# Patient Record
Sex: FEMALE | Race: WHITE | Employment: UNEMPLOYED | URBAN - METROPOLITAN AREA
[De-identification: names, ages, dates, MRNs, and addresses within clinical notes are randomized per-mention and may not be internally consistent; named-entity substitution may affect disease eponyms.]

---

## 2022-04-13 ENCOUNTER — HOSPITAL ENCOUNTER (OUTPATIENT)
Age: 57
Setting detail: SPECIMEN
Discharge: HOME | End: 2022-04-13

## 2022-04-13 ENCOUNTER — OFFICE VISIT (OUTPATIENT)
Dept: FAMILY MEDICINE CLINIC | Age: 57
End: 2022-04-13
Payer: COMMERCIAL

## 2022-04-13 VITALS
DIASTOLIC BLOOD PRESSURE: 66 MMHG | SYSTOLIC BLOOD PRESSURE: 113 MMHG | HEIGHT: 63 IN | WEIGHT: 174.4 LBS | HEART RATE: 72 BPM | BODY MASS INDEX: 30.9 KG/M2

## 2022-04-13 DIAGNOSIS — K59.00 CONSTIPATION, UNSPECIFIED CONSTIPATION TYPE: ICD-10-CM

## 2022-04-13 DIAGNOSIS — Z12.31 ENCOUNTER FOR SCREENING MAMMOGRAM FOR BREAST CANCER: ICD-10-CM

## 2022-04-13 DIAGNOSIS — Z13.220 SCREENING FOR HYPERLIPIDEMIA: ICD-10-CM

## 2022-04-13 DIAGNOSIS — Z00.00 HEALTH CARE MAINTENANCE: ICD-10-CM

## 2022-04-13 DIAGNOSIS — M54.42 CHRONIC BILATERAL LOW BACK PAIN WITH LEFT-SIDED SCIATICA: Primary | ICD-10-CM

## 2022-04-13 DIAGNOSIS — K21.9 GASTROESOPHAGEAL REFLUX DISEASE WITHOUT ESOPHAGITIS: ICD-10-CM

## 2022-04-13 DIAGNOSIS — Z12.11 SCREEN FOR COLON CANCER: ICD-10-CM

## 2022-04-13 DIAGNOSIS — G89.29 CHRONIC BILATERAL LOW BACK PAIN WITH LEFT-SIDED SCIATICA: Primary | ICD-10-CM

## 2022-04-13 LAB
ABSOLUTE EOS #: 0.1 K/UL (ref 0–0.44)
ABSOLUTE IMMATURE GRANULOCYTE: 0.03 K/UL (ref 0–0.3)
ABSOLUTE LYMPH #: 1.77 K/UL (ref 1.1–3.7)
ABSOLUTE MONO #: 0.6 K/UL (ref 0.1–1.2)
ALBUMIN SERPL-MCNC: 4.4 G/DL (ref 3.5–5.2)
ALBUMIN/GLOBULIN RATIO: 1.6 (ref 1–2.5)
ALP BLD-CCNC: 107 U/L (ref 35–104)
ALT SERPL-CCNC: 21 U/L (ref 5–33)
ANION GAP SERPL CALCULATED.3IONS-SCNC: 14 MMOL/L (ref 9–17)
AST SERPL-CCNC: 23 U/L
BASOPHILS # BLD: 0 % (ref 0–2)
BASOPHILS ABSOLUTE: 0.03 K/UL (ref 0–0.2)
BILIRUB SERPL-MCNC: 0.31 MG/DL (ref 0.3–1.2)
BUN BLDV-MCNC: 12 MG/DL (ref 6–20)
CALCIUM SERPL-MCNC: 9.7 MG/DL (ref 8.6–10.4)
CHLORIDE BLD-SCNC: 107 MMOL/L (ref 98–107)
CHOLESTEROL/HDL RATIO: 3.4
CHOLESTEROL: 211 MG/DL
CO2: 26 MMOL/L (ref 20–31)
CREAT SERPL-MCNC: 0.73 MG/DL (ref 0.5–0.9)
EOSINOPHILS RELATIVE PERCENT: 1 % (ref 1–4)
ESTIMATED AVERAGE GLUCOSE: 108 MG/DL
GFR AFRICAN AMERICAN: >60 ML/MIN
GFR NON-AFRICAN AMERICAN: >60 ML/MIN
GFR SERPL CREATININE-BSD FRML MDRD: ABNORMAL ML/MIN/{1.73_M2}
GLUCOSE BLD-MCNC: 90 MG/DL (ref 70–99)
HBA1C MFR BLD: 5.4 % (ref 4–6)
HCT VFR BLD CALC: 44.2 % (ref 36.3–47.1)
HDLC SERPL-MCNC: 62 MG/DL
HEMOGLOBIN: 14.5 G/DL (ref 11.9–15.1)
HEPATITIS C ANTIBODY: NONREACTIVE
HIV AG/AB: NONREACTIVE
IMMATURE GRANULOCYTES: 0 %
LDL CHOLESTEROL: 118 MG/DL (ref 0–130)
LYMPHOCYTES # BLD: 25 % (ref 24–43)
MCH RBC QN AUTO: 29.9 PG (ref 25.2–33.5)
MCHC RBC AUTO-ENTMCNC: 32.8 G/DL (ref 28.4–34.8)
MCV RBC AUTO: 91.1 FL (ref 82.6–102.9)
MONOCYTES # BLD: 9 % (ref 3–12)
NRBC AUTOMATED: 0 PER 100 WBC
PDW BLD-RTO: 13.2 % (ref 11.8–14.4)
PLATELET # BLD: 238 K/UL (ref 138–453)
PMV BLD AUTO: 9.5 FL (ref 8.1–13.5)
POTASSIUM SERPL-SCNC: 4.7 MMOL/L (ref 3.7–5.3)
RBC # BLD: 4.85 M/UL (ref 3.95–5.11)
SEG NEUTROPHILS: 65 % (ref 36–65)
SEGMENTED NEUTROPHILS ABSOLUTE COUNT: 4.51 K/UL (ref 1.5–8.1)
SODIUM BLD-SCNC: 147 MMOL/L (ref 135–144)
TOTAL PROTEIN: 7.2 G/DL (ref 6.4–8.3)
TRIGL SERPL-MCNC: 153 MG/DL
TSH SERPL DL<=0.05 MIU/L-ACNC: 1.68 UIU/ML (ref 0.3–5)
WBC # BLD: 7 K/UL (ref 3.5–11.3)

## 2022-04-13 PROCEDURE — 99211 OFF/OP EST MAY X REQ PHY/QHP: CPT | Performed by: FAMILY MEDICINE

## 2022-04-13 PROCEDURE — 99203 OFFICE O/P NEW LOW 30 MIN: CPT | Performed by: STUDENT IN AN ORGANIZED HEALTH CARE EDUCATION/TRAINING PROGRAM

## 2022-04-13 RX ORDER — OMEPRAZOLE 10 MG/1
10 CAPSULE, DELAYED RELEASE ORAL DAILY
COMMUNITY
End: 2022-04-13 | Stop reason: SDUPTHER

## 2022-04-13 RX ORDER — OMEPRAZOLE 10 MG/1
10 CAPSULE, DELAYED RELEASE ORAL DAILY
Qty: 30 CAPSULE | Refills: 0 | Status: SHIPPED | OUTPATIENT
Start: 2022-04-13 | End: 2022-04-27 | Stop reason: DRUGHIGH

## 2022-04-13 RX ORDER — CYCLOBENZAPRINE HCL 5 MG
5 TABLET ORAL NIGHTLY PRN
Qty: 28 TABLET | Refills: 0 | Status: SHIPPED | OUTPATIENT
Start: 2022-04-13 | End: 2022-07-06 | Stop reason: SDUPTHER

## 2022-04-13 ASSESSMENT — PATIENT HEALTH QUESTIONNAIRE - PHQ9
9. THOUGHTS THAT YOU WOULD BE BETTER OFF DEAD, OR OF HURTING YOURSELF: 0
2. FEELING DOWN, DEPRESSED OR HOPELESS: 1
SUM OF ALL RESPONSES TO PHQ9 QUESTIONS 1 & 2: 2
10. IF YOU CHECKED OFF ANY PROBLEMS, HOW DIFFICULT HAVE THESE PROBLEMS MADE IT FOR YOU TO DO YOUR WORK, TAKE CARE OF THINGS AT HOME, OR GET ALONG WITH OTHER PEOPLE: 0
5. POOR APPETITE OR OVEREATING: 3
4. FEELING TIRED OR HAVING LITTLE ENERGY: 1
7. TROUBLE CONCENTRATING ON THINGS, SUCH AS READING THE NEWSPAPER OR WATCHING TELEVISION: 0
SUM OF ALL RESPONSES TO PHQ QUESTIONS 1-9: 11
3. TROUBLE FALLING OR STAYING ASLEEP: 3
8. MOVING OR SPEAKING SO SLOWLY THAT OTHER PEOPLE COULD HAVE NOTICED. OR THE OPPOSITE, BEING SO FIGETY OR RESTLESS THAT YOU HAVE BEEN MOVING AROUND A LOT MORE THAN USUAL: 1
SUM OF ALL RESPONSES TO PHQ QUESTIONS 1-9: 11
1. LITTLE INTEREST OR PLEASURE IN DOING THINGS: 1
SUM OF ALL RESPONSES TO PHQ QUESTIONS 1-9: 11
6. FEELING BAD ABOUT YOURSELF - OR THAT YOU ARE A FAILURE OR HAVE LET YOURSELF OR YOUR FAMILY DOWN: 1
SUM OF ALL RESPONSES TO PHQ QUESTIONS 1-9: 11

## 2022-04-13 ASSESSMENT — ENCOUNTER SYMPTOMS
VOMITING: 0
NAUSEA: 0
SHORTNESS OF BREATH: 0
COLOR CHANGE: 0
CONSTIPATION: 1
WHEEZING: 0
COUGH: 0
ABDOMINAL PAIN: 0
SINUS PAIN: 0
BLOOD IN STOOL: 0
BACK PAIN: 1
SINUS PRESSURE: 0
DIARRHEA: 0

## 2022-04-13 NOTE — PROGRESS NOTES
Visit Information    Have you changed or started any medications since your last visit including any over-the-counter medicines, vitamins, or herbal medicines? no   Have you stopped taking any of your medications? Is so, why? -  no  Are you having any side effects from any of your medications? - no    Have you seen any other physician or provider since your last visit?  no   Have you had any other diagnostic tests since your last visit?  no   Have you been seen in the emergency room and/or had an admission in a hospital since we last saw you?  no   Have you had your routine dental cleaning in the past 6 months?  no     Do you have an active MyChart account? If no, what is the barrier?   No: inactive    Patient Care Team:  Arturo Brandt MD as PCP - General (Emergency Medicine)    Medical History Review  Past Medical, Family, and Social History reviewed and does not contribute to the patient presenting condition    Health Maintenance   Topic Date Due    Hepatitis C screen  Never done    COVID-19 Vaccine (1) Never done    Depression Screen  Never done    HIV screen  Never done    DTaP/Tdap/Td vaccine (1 - Tdap) Never done    Cervical cancer screen  Never done    Lipid screen  Never done    Colorectal Cancer Screen  Never done    Breast cancer screen  Never done    Shingles Vaccine (1 of 2) Never done    Flu vaccine (Season Ended) 09/01/2022    Hepatitis A vaccine  Aged Out    Hepatitis B vaccine  Aged Out    Hib vaccine  Aged Out    Meningococcal (ACWY) vaccine  Aged Out    Pneumococcal 0-64 years Vaccine  Aged Out

## 2022-04-13 NOTE — PROGRESS NOTES
Subjective:    Bola Ann is a 64 y.o. female with  has no past medical history on file. Presented to the office today for:  Chief Complaint   Patient presents with    Back Pain     disability for back pain, for about a year     Leg Pain     left leg pain, almost not able to walk or leg gives out, would like to discuss walker pt has done PT and stationary bicycle     Health Maintenance     need to discuss colonscopy, lives on laxatives to use restroom, due for mammogram, had full hysterectomy with cervix removed, declined vaccines        HPI  This is a 78-year-old female with a surgical history of hysterectomy with bilateral salpingo-oophorectomy unknown if the cervix is removed, 3 bladder surgeries including sling procedure, sleeve gastrectomy and chronic back pain came in today to establish care. Cc: Back pain   Onset: 8 years ago   Mechanism: Unknown   Characteristic: Sharp   Duration: Intermittent   Worsening/Improving: Worsening   Aggravating factors: Physical exertion and bending forward   Relieving factors: Muscle relaxant   Associated symptoms: None   Radiation: Recently started radiating towards left side   Intervention tried: Muscle relaxant   Direct trauma/fall/injury: None   Weakness/numbess/tingling: None   Night pain: Wakes her up but usually whenever she is sleeping in awkward position   Diabetes/Thyroid disease: Ordered                      Review of Systems   Constitutional: Negative for chills and fever. HENT: Negative for congestion, sinus pressure and sinus pain. Respiratory: Negative for cough, shortness of breath and wheezing. Cardiovascular: Negative for chest pain, palpitations and leg swelling. Gastrointestinal: Positive for constipation. Negative for abdominal pain, blood in stool, diarrhea, nausea and vomiting. Endocrine: Positive for cold intolerance. Genitourinary: Negative for difficulty urinating, flank pain and hematuria.    Musculoskeletal: Positive for back pain. Negative for arthralgias and myalgias. Skin: Negative for color change and wound. Neurological: Negative for dizziness, light-headedness and headaches. Psychiatric/Behavioral: Negative for agitation and confusion. ROS negative except what mentioned in HPI. The patient has a   Family History   Problem Relation Age of Onset    Asthma Mother     Other Mother         COPD    Cancer Father         Lung    Substance Abuse Sister         Clean for a year now   Malena Her Obesity Sister     No Known Problems Brother     No Known Problems Brother        Objective:    /66 (Site: Left Upper Arm, Position: Sitting, Cuff Size: Medium Adult) Comment: machine  Pulse 72   Ht 5' 3\" (1.6 m)   Wt 174 lb 6.4 oz (79.1 kg)   BMI 30.89 kg/m²    BP Readings from Last 3 Encounters:   04/13/22 113/66       Physical Exam  Vitals and nursing note reviewed. Constitutional:       Appearance: Normal appearance. HENT:      Head: Normocephalic and atraumatic. Mouth/Throat:      Mouth: Mucous membranes are moist.   Eyes:      Conjunctiva/sclera: Conjunctivae normal.   Cardiovascular:      Rate and Rhythm: Normal rate and regular rhythm. Pulmonary:      Effort: Pulmonary effort is normal.      Breath sounds: Normal breath sounds. Abdominal:      General: Bowel sounds are normal. There is no distension. Palpations: Abdomen is soft. There is no mass. Tenderness: There is no abdominal tenderness. There is no guarding. Musculoskeletal:        Back:       Right lower leg: No edema. Left lower leg: No edema. Neurological:      General: No focal deficit present. Mental Status: She is alert and oriented to person, place, and time.    Psychiatric:         Mood and Affect: Mood normal.         Behavior: Behavior normal.         No results found for: WBC, HGB, HCT, PLT, CHOL, TRIG, HDL, LDLDIRECT, ALT, AST, NA, K, CL, CREATININE, BUN, CO2, TSH, PSA, INR, GLUF, LABA1C, LABMICR  No results found for: CALCIUM, PHOS  No results found for: LDLCALC, LDLCHOLESTEROL, LDLDIRECT    Examination including this and mentioned above in HPI. Assessment and Plan:    1. Chronic bilateral low back pain with left-sided sciatica  -Most likely spinal stenosis versus osteoarthritis, will get an x-ray, give patient some muscle relaxant, will refer to neurosurgery/orthopedic based on the imaging finding. Patient at this point does not want to try physical therapy as previously it aggravated the symptoms. - XR LUMBAR SPINE (2-3 VIEWS); Future  - Handicap Placard MISC; by Does not apply route Will be valid for 5 year  Dispense: 1 each; Refill: 0  -Flexeril 5 mg at night. 2. Constipation, unspecified constipation type  - TSH With Reflex Ft4; Future    3. Health care maintenance  - Hepatitis C Antibody; Future  - HIV Screen; Future  - CBC with Auto Differential; Future  - Comprehensive Metabolic Panel; Future  - Hemoglobin A1C; Future  - [facility] Digital Screen Bilateral [[id number]]; Future  - Fecal DNA Colorectal cancer screening (Cologuard)  - Lipid Panel; Future  - [facility] Digital Screen Bilateral [id number]; Future    4. Gastroesophageal reflux disease without esophagitis  - omeprazole (PRILOSEC) 10 MG delayed release capsule; Take 1 capsule by mouth daily  Dispense: 30 capsule;  Refill: 0          Requested Prescriptions     Signed Prescriptions Disp Refills    Handicap Placard MISC 1 each 0     Sig: by Does not apply route Will be valid for 5 year    omeprazole (PRILOSEC) 10 MG delayed release capsule 30 capsule 0     Sig: Take 1 capsule by mouth daily    cyclobenzaprine (FLEXERIL) 5 MG tablet 28 tablet 0     Sig: Take 1 tablet by mouth nightly as needed for Muscle spasms       Medications Discontinued During This Encounter   Medication Reason    omeprazole (PRILOSEC) 10 MG delayed release capsule REORDER       Nena received counseling on the following healthy behaviors: nutrition, exercise and medication adherence      Patient was counseled about importance of taking medication which were prescribed today and also which he is already using during today conversation. Discussed use,benefit, and side effects of prescribed medications. Barriers to medication compliance addressed. Patient was also counseled that lifestyle changes including diet, smoking and use of less alcohol will benefit their health in long term. All the question asked by the patient were answered in non-medical terms and to be best of writer knowledge. Patient understands and agree to the plan. Teach back method was used while having the conversation. All patient questions answered. Pt voiced understanding. Return in about 2 weeks (around 4/27/2022). Disclaimer: Some oral of this note was transcribed using voice-recognition software. This may cause typographical errors occasionally, please review it with the context of the note. Although all effort is made to fix these errors, please do not hesitate to contact our office if there Moon Crum concern with the understanding of this note.

## 2022-04-14 ENCOUNTER — HOSPITAL ENCOUNTER (OUTPATIENT)
Dept: GENERAL RADIOLOGY | Age: 57
Discharge: HOME | End: 2022-04-16
Payer: COMMERCIAL

## 2022-04-14 ENCOUNTER — HOSPITAL ENCOUNTER (OUTPATIENT)
Age: 57
Discharge: HOME | End: 2022-04-16
Payer: COMMERCIAL

## 2022-04-14 DIAGNOSIS — G89.29 CHRONIC BILATERAL LOW BACK PAIN WITH LEFT-SIDED SCIATICA: ICD-10-CM

## 2022-04-14 DIAGNOSIS — M54.42 CHRONIC BILATERAL LOW BACK PAIN WITH LEFT-SIDED SCIATICA: ICD-10-CM

## 2022-04-14 PROCEDURE — 72100 X-RAY EXAM L-S SPINE 2/3 VWS: CPT

## 2022-04-27 ENCOUNTER — OFFICE VISIT (OUTPATIENT)
Dept: FAMILY MEDICINE CLINIC | Age: 57
End: 2022-04-27
Payer: COMMERCIAL

## 2022-04-27 VITALS — WEIGHT: 177.6 LBS | BODY MASS INDEX: 31.47 KG/M2 | TEMPERATURE: 97 F | HEIGHT: 63 IN

## 2022-04-27 DIAGNOSIS — G89.29 CHRONIC BILATERAL LOW BACK PAIN WITH BILATERAL SCIATICA: ICD-10-CM

## 2022-04-27 DIAGNOSIS — R20.0 NUMBNESS AND TINGLING IN BOTH HANDS: Primary | ICD-10-CM

## 2022-04-27 DIAGNOSIS — M54.42 CHRONIC BILATERAL LOW BACK PAIN WITH BILATERAL SCIATICA: ICD-10-CM

## 2022-04-27 DIAGNOSIS — M54.41 CHRONIC BILATERAL LOW BACK PAIN WITH BILATERAL SCIATICA: ICD-10-CM

## 2022-04-27 DIAGNOSIS — R20.2 NUMBNESS AND TINGLING IN BOTH HANDS: Primary | ICD-10-CM

## 2022-04-27 DIAGNOSIS — K21.9 GASTROESOPHAGEAL REFLUX DISEASE WITHOUT ESOPHAGITIS: ICD-10-CM

## 2022-04-27 PROCEDURE — 99213 OFFICE O/P EST LOW 20 MIN: CPT | Performed by: STUDENT IN AN ORGANIZED HEALTH CARE EDUCATION/TRAINING PROGRAM

## 2022-04-27 RX ORDER — OMEPRAZOLE 20 MG/1
20 TABLET, DELAYED RELEASE ORAL DAILY
Qty: 30 TABLET | Refills: 3 | Status: SHIPPED | OUTPATIENT
Start: 2022-04-27 | End: 2022-08-24

## 2022-04-27 RX ORDER — GABAPENTIN 100 MG/1
100 CAPSULE ORAL 2 TIMES DAILY
Qty: 60 CAPSULE | Refills: 1 | Status: SHIPPED | OUTPATIENT
Start: 2022-04-27 | End: 2022-06-29

## 2022-04-27 SDOH — ECONOMIC STABILITY: FOOD INSECURITY: WITHIN THE PAST 12 MONTHS, YOU WORRIED THAT YOUR FOOD WOULD RUN OUT BEFORE YOU GOT MONEY TO BUY MORE.: NEVER TRUE

## 2022-04-27 SDOH — ECONOMIC STABILITY: FOOD INSECURITY: WITHIN THE PAST 12 MONTHS, THE FOOD YOU BOUGHT JUST DIDN'T LAST AND YOU DIDN'T HAVE MONEY TO GET MORE.: NEVER TRUE

## 2022-04-27 ASSESSMENT — ENCOUNTER SYMPTOMS
NAUSEA: 0
BACK PAIN: 1
DIARRHEA: 0
CONSTIPATION: 0
WHEEZING: 0
COLOR CHANGE: 0
SHORTNESS OF BREATH: 0
VOMITING: 0
SINUS PRESSURE: 0
COUGH: 0
BLOOD IN STOOL: 0
ABDOMINAL PAIN: 0
SINUS PAIN: 0

## 2022-04-27 ASSESSMENT — SOCIAL DETERMINANTS OF HEALTH (SDOH): HOW HARD IS IT FOR YOU TO PAY FOR THE VERY BASICS LIKE FOOD, HOUSING, MEDICAL CARE, AND HEATING?: NOT HARD AT ALL

## 2022-04-27 NOTE — PROGRESS NOTES
Visit Information    Have you changed or started any medications since your last visit including any over-the-counter medicines, vitamins, or herbal medicines? no   Have you stopped taking any of your medications? Is so, why? -  no  Are you having any side effects from any of your medications? - no    Have you seen any other physician or provider since your last visit?  no   Have you had any other diagnostic tests since your last visit? yes - Labs, XR   Have you been seen in the emergency room and/or had an admission in a hospital since we last saw you?  no   Have you had your routine dental cleaning in the past 6 months?  no     Do you have an active MyChart account? If no, what is the barrier?   Yes    Patient Care Team:  Rosmery Atkins MD as PCP - General (Emergency Medicine)    Medical History Review  Past Medical, Family, and Social History reviewed and does not contribute to the patient presenting condition    Health Maintenance   Topic Date Due    Colorectal Cancer Screen  Never done    Breast cancer screen  Never done    COVID-19 Vaccine (1) 04/03/2023 (Originally 5/1/1970)    DTaP/Tdap/Td vaccine (1 - Tdap) 04/13/2023 (Originally 5/1/1984)    Shingles Vaccine (1 of 2) 04/13/2023 (Originally 5/1/2015)    Flu vaccine (Season Ended) 09/01/2022    Depression Monitoring  04/13/2023    Lipids  04/13/2027    Hepatitis C screen  Completed    HIV screen  Completed    Hepatitis A vaccine  Aged Out    Hepatitis B vaccine  Aged Out    Hib vaccine  Aged Out    Meningococcal (ACWY) vaccine  Aged Out    Pneumococcal 0-64 years Vaccine  Aged Out

## 2022-04-27 NOTE — PROGRESS NOTES
Attending Physician Statement  I have discussed the care of Tali Murry 64 y.o. female, including pertinent history and exam findings, with the resident Dr. Adelia Hall MD.    History and Exam:   Chief Complaint   Patient presents with    Back Pain     Follow up for leg and back pain - Pain getting worse 8/10 pain       No past medical history on file. Allergies   Allergen Reactions    Penicillins      Nauseous       I have seen and examined the patient and the key elements of the encounter have been performed by me. BP Readings from Last 3 Encounters:   04/13/22 113/66     Temp 97 °F (36.1 °C) (Temporal)   Ht 5' 2.99\" (1.6 m)   Wt 177 lb 9.6 oz (80.6 kg)   BMI 31.47 kg/m²   Lab Results   Component Value Date    WBC 7.0 04/13/2022    HGB 14.5 04/13/2022    HCT 44.2 04/13/2022     04/13/2022    CHOL 211 (H) 04/13/2022    TRIG 153 (H) 04/13/2022    HDL 62 04/13/2022    ALT 21 04/13/2022    AST 23 04/13/2022     (H) 04/13/2022    K 4.7 04/13/2022     04/13/2022    CREATININE 0.73 04/13/2022    BUN 12 04/13/2022    CO2 26 04/13/2022    TSH 1.68 04/13/2022    LABA1C 5.4 04/13/2022     Lab Results   Component Value Date    LABALBU 4.4 04/13/2022     No results found for: IRON, TIBC, FERRITIN  Lab Results   Component Value Date    LDLCHOLESTEROL 118 04/13/2022     I agree with the assessment, plan and the diagnosis of    Diagnosis Orders   1. Numbness and tingling in both hands  gabapentin (NEURONTIN) 100 MG capsule    Vitamin B12 & Folate   2. Chronic bilateral low back pain with bilateral sciatica  Elsa Martinez DO, Neurosurgery, [address] [facility]   3. Gastroesophageal reflux disease without esophagitis  omeprazole (PRILOSEC OTC) 20 MG tablet    . I agree with orders as documented by the resident. More than 25 minutes spent  in face to face encounter with the patient and more than half in counseling. Patient's questions were answered. Patient Voiced understanding to the counseling. Return in about 3 months (around 7/27/2022) for Depression and Back pain.    (GC Modifier)-Dr. Jose Ramon Neumann MD

## 2022-04-27 NOTE — PROGRESS NOTES
Subjective:    Seema Smiley is a 64 y.o. female with  has no past medical history on file. Presented to the office today for:  Chief Complaint   Patient presents with    Back Pain     Follow up for leg and back pain - Pain getting worse 8/10 pain       HPI  Is a 60-year-old female came in today for follow-up on back pain and lab works. Discussed in details with the patient about the findings on the back pain, patient does have degenerative changes around T10-T11. Patient mentioned that she has worsening numbness and tingling on bilateral legs with a new onset of numbness and tingling on the upper extremity. Patient denies any slurred speech, loss of consciousness, direct trauma at this time. Review of Systems   Constitutional: Negative for chills and fever. HENT: Negative for congestion, sinus pressure and sinus pain. Respiratory: Negative for cough, shortness of breath and wheezing. Cardiovascular: Negative for chest pain, palpitations and leg swelling. Gastrointestinal: Negative for abdominal pain, blood in stool, constipation, diarrhea, nausea and vomiting. Genitourinary: Negative for difficulty urinating, flank pain and hematuria. Musculoskeletal: Positive for back pain. Negative for arthralgias and myalgias. Skin: Negative for color change and wound. Neurological: Negative for dizziness, light-headedness and headaches. Psychiatric/Behavioral: Negative for agitation and confusion. ROS negative except what mentioned in HPI.         The patient has a   Family History   Problem Relation Age of Onset    Asthma Mother     Other Mother         COPD    Cancer Father         Lung    Substance Abuse Sister         Clean for a year now    Obesity Sister     No Known Problems Brother     No Known Problems Brother        Objective:    Temp 97 °F (36.1 °C) (Temporal)   Ht 5' 2.99\" (1.6 m)   Wt 177 lb 9.6 oz (80.6 kg)   BMI 31.47 kg/m²    BP Readings from Last 3 Encounters:   04/13/22 113/66       Physical Exam  Vitals and nursing note reviewed. Constitutional:       Appearance: Normal appearance. HENT:      Head: Normocephalic and atraumatic. Mouth/Throat:      Mouth: Mucous membranes are moist.   Eyes:      Conjunctiva/sclera: Conjunctivae normal.   Cardiovascular:      Rate and Rhythm: Normal rate and regular rhythm. Pulmonary:      Effort: Pulmonary effort is normal.      Breath sounds: Normal breath sounds. Abdominal:      General: Bowel sounds are normal. There is no distension. Palpations: Abdomen is soft. There is no mass. Tenderness: There is no abdominal tenderness. There is no guarding. Musculoskeletal:      Right lower leg: No edema. Left lower leg: No edema. Neurological:      General: No focal deficit present. Mental Status: She is alert and oriented to person, place, and time. Psychiatric:         Mood and Affect: Mood normal.         Behavior: Behavior normal.         Lab Results   Component Value Date    WBC 7.0 04/13/2022    HGB 14.5 04/13/2022    HCT 44.2 04/13/2022     04/13/2022    CHOL 211 (H) 04/13/2022    TRIG 153 (H) 04/13/2022    HDL 62 04/13/2022    ALT 21 04/13/2022    AST 23 04/13/2022     (H) 04/13/2022    K 4.7 04/13/2022     04/13/2022    CREATININE 0.73 04/13/2022    BUN 12 04/13/2022    CO2 26 04/13/2022    TSH 1.68 04/13/2022    LABA1C 5.4 04/13/2022     Lab Results   Component Value Date    CALCIUM 9.7 04/13/2022     Lab Results   Component Value Date    LDLCHOLESTEROL 118 04/13/2022       Examination including this and mentioned above in HPI. Assessment and Plan:    1. Numbness and tingling in both hands  -Will start patient on gabapentin and escalate therapy as needed, since patient has a history of sleeve gastrectomy vitamin B12 or folate deficiency might be contributing towards new onset of numbness.   Refer the patient to physical therapy and neurosurgery for further evaluation.  - gabapentin (NEURONTIN) 100 MG capsule; Take 1 capsule by mouth 2 times daily for 60 days. Intended supply: 90 days  Dispense: 60 capsule; Refill: 1  - Vitamin B12 & Folate; Future    2. Chronic bilateral low back pain with bilateral sciatica  - Escobar Wilcox, DO Y[place], Neurosurgery, [address] [facility]    3. Gastroesophageal reflux disease without esophagitis  -Discussed with patient that PPI might be contributing her having low vitamin B12, as patient symptoms are controlled with Prilosec we will check the levels and adjust therapy as needed. - omeprazole (PRILOSEC OTC) 20 MG tablet; Take 1 tablet by mouth daily  Dispense: 30 tablet; Refill: 3          Requested Prescriptions     Signed Prescriptions Disp Refills    gabapentin (NEURONTIN) 100 MG capsule 60 capsule 1     Sig: Take 1 capsule by mouth 2 times daily for 60 days. Intended supply: 90 days    omeprazole (PRILOSEC OTC) 20 MG tablet 30 tablet 3     Sig: Take 1 tablet by mouth daily       Medications Discontinued During This Encounter   Medication Reason    omeprazole (PRILOSEC) 10 MG delayed release capsule DOSE ADJUSTMENT       Kelley Mayo received counseling on the following healthy behaviors: nutrition, exercise and medication adherence      Patient was counseled about importance of taking medication which were prescribed today and also which he is already using during today conversation. Discussed use,benefit, and side effects of prescribed medications. Barriers to medication compliance addressed. Patient was also counseled that lifestyle changes including diet, smoking and use of less alcohol will benefit their health in long term. All the question asked by the patient were answered in non-medical terms and to be best of writer knowledge. Patient understands and agree to the plan. Teach back method was used while having the conversation. All patient questions answered. Pt voiced understanding. Return in about 3 months (around 7/27/2022) for Depression and Back pain. Disclaimer: Some oral of this note was transcribed using voice-recognition software. This may cause typographical errors occasionally, please review it with the context of the note. Although all effort is made to fix these errors, please do not hesitate to contact our office if there Meghana President concern with the understanding of this note.

## 2022-04-27 NOTE — PATIENT INSTRUCTIONS
Thank you for letting us take care of you today. We hope all your questions were addressed. If a question was overlooked or something else comes to mind after you return home, please contact a member of your Care Team listed below. Your Care Team at [place] [address] is Team #5  Carlos Sharpe MD (Faculty)  Niecy Delvalle MD (Resident)  Baldev Thomas MD (Resident)  Raiza Rice MD (Resident)  Shauna Martinez MD (Resident)  Michael Bundy., BJ Dixon., GIULIANO Kendrick., Elvis Chambers., [facility] office)  Austin Sherman, [address] (Clinical Practice Manager)  Chance Pastor [facility] (Clinical Pharmacist)       Office phone number: [phone]    If you need to get in right away due to illness, please be advised we have \"Same Day\" appointments available Monday-Friday. Please call us at [phone] option #3 to schedule your \"Same Day\" appointment. Patient Education        Learning About Depression Screening  What is depression screening? Depression screening is a way to see if you have depression symptoms. It may be done by a doctor or counselor. It's often part of a routine checkup. That'sbecause your mental health is just as important as your physical health. Depression is a medical illness that affects how you feel, think, and act. Erin Elaine Fan Have less energy.  Lose interest in your daily activities.  Feel sad and grouchy for a long time. Depression is very common. It affects men and women of all ages. Many things can trigger depression. Some people become depressed after they have a stroke or find out they have a major illness like cancer or heart disease. The death of a loved one, a breakup, or changes in the natural brain chemicals may lead to depression. It can run in families. Most experts believethat a combination of inherited genes and stressful life events can cause it. What happens during screening? You may be asked to fill out a form about your depression symptoms.  You and the doctor will discuss your answers. The doctor may ask you more questions tolearn more about how you think, act, and feel. What happens after screening? If you have signs of depression, your doctor will talk to you about youroptions. Doctors usually treat depression with medicines or counseling. Often, combining the two works best. Many people don't get help because they think that they'll get over the depression on their own. But people with depression may not getbetter unless they get treatment. Many people feel embarrassed or ashamed about having depression. But it isn't a sign of personal weakness. It's not a character flaw. A person who is depressedis not \"crazy. \" Depression is caused by changes in the brain. A serious symptom of depression is thinking about death or suicide. If you or someone you care about talks about this or about feeling hopeless, get helpright away. It's important to know that depression can be treated. The first step towardfeeling better is often just seeing that the problem exists. Where can you learn more? Go to https://MyGardenSchool.LeisureLink. org and sign in to your UserTesting account. Enter T185 in the [employer] box to learn more about \"Learning About Depression Screening. \"     If you do not have an account, please click on the \"Sign Up Now\" link. Current as of: June 16, 2021               Content Version: 13.2  © 7791-0378 Healthwise, Incorporated. Care instructions adapted under license by [facility] ([facility]). If you have questions about a medical condition or this instruction, always ask your healthcare professional. [place] [address] any warranty or liability for your use of this information. Patient Education        Recovering From Depression: Care Instructions  Your Care Instructions     Taking good care of yourself is important as you recover from depression. In time, your symptoms will fade as your treatment takes hold. Do not give up. Instead, focus your energy on getting better. Your mood will improve. It just takes some time. Focus on things that can help you feel better, such as being with friends and family, eating well, and getting enough rest. But take things slowly. Do not do too much too soon. Youwill begin to feel better gradually. Follow-up care is a key part of your treatment and safety. Be sure to make and go to all appointments, and call your doctor if you are having problems. It's also a good idea to know your test results and keep alist of the medicines you take. How can you care for yourself at home? Be realistic   If you have a large task to do, break it up into smaller steps you can handle, and just do what you can.  You may want to put off important decisions until your depression has lifted. If you have plans that will have a major impact on your life, such as marriage, divorce, or a job change, try to wait a bit. Talk it over with friends and loved ones who can help you look at the overall picture first.   Reaching out to people for help is important. Do not isolate yourself. Let your family and friends help you. Find someone you can trust and confide in, and talk to that person.  Be patient, and be kind to yourself. Remember that depression is not your fault and is not something you can overcome with willpower alone. Treatment is important for depression, just like for any other illness. Feeling better takes time, and your mood will improve little by little. Stay active   Stay busy and get outside. Take a walk, or try some other light exercise.  Talk with your doctor about an exercise program. Exercise can help with mild depression.  Go to a movie or concert. Take part in a Religion activity or other social gathering. Go to a ball game.  Ask a friend to have dinner with you. Take care of yourself   Eat a balanced diet with plenty of fresh fruits and vegetables, whole grains, and lean protein.  If you have lost your appetite, eat small snacks rather than large meals.  Avoid using illegal drugs or marijuana and drinking alcohol. Do not take medicines that have not been prescribed for you. They may interfere with medicines you may be taking for depression, or they may make your depression worse.  Take your medicines exactly as they are prescribed. You may start to feel better within 1 to 3 weeks of taking antidepressant medicine. But it can take as many as 6 to 8 weeks to see more improvement. If you have questions or concerns about your medicines, or if you do not notice any improvement by 3 weeks, talk to your doctor.  Continue to take your medicine after your symptoms improve. Taking your medicine for at least 6 months after you feel better can help keep you from getting depressed again. If this isn't the first time you have been depressed, your doctor may recommend you to take medicine even longer.  If you have any side effects from your medicine, tell your doctor. Many side effects are mild and will go away on their own after you have been taking the medicine for a few weeks. Some may last longer. Talk to your doctor if side effects are bothering you too much. You might be able to try a different medicine.  Continue counseling. It may help prevent depression from returning, especially if you've had multiple episodes of depression. Talk with your counselor if you are having a hard time attending your sessions or you think the sessions aren't working. Don't just stop going.  Get enough sleep. Talk to your doctor if you are having problems sleeping.  Avoid sleeping pills unless they are prescribed by the doctor treating your depression. Sleeping pills may make you groggy during the day, and they may interact with other medicine you are taking.  If you have any other illnesses, such as diabetes, heart disease, or high blood pressure, make sure to continue with your treatment.  Tell your doctor about all of the medicines you take, including those with or without a prescription.  If you or someone you know talks about suicide, self-harm, or feeling hopeless, get help right away. Call the [address] at 9-114-799-IAHS ([phone]) or text HOME to 998851 to access the Crisis Text Line. Consider saving these numbers in your phone. When should you call for help? Call 911 anytime you think you may need emergency care. For example, call if:     You feel like hurting yourself or someone else.      Someone you know has depression and is about to attempt or is attempting suicide. Call your doctor now or seek immediate medical care if:     You hear voices.      Someone you know has depression and:  ? Starts to give away his or her possessions. ? Uses illegal drugs or drinks alcohol heavily. ? Talks or writes about death, including writing suicide notes or talking about guns, knives, or pills. ? Starts to spend a lot of time alone. ? Acts very aggressively or suddenly appears calm. Watch closely for changes in your health, and be sure to contact your doctor if:     You do not get better as expected. Where can you learn more? Go to https://FanHeropeProcurifyew"[employer]book [employer].Brilliant.org. org and sign in to your Vistronix account. Enter K226 in the [facility] box to learn more about \"Recovering From Depression: Care Instructions. \"     If you do not have an account, please click on the \"Sign Up Now\" link. Current as of: June 16, 2021               Content Version: 13.2  © 2006-2022 Healthwise, Incorporated. Care instructions adapted under license by [facility] ([facility]). If you have questions about a medical condition or this instruction, always ask your healthcare professional. [place] [address] any warranty or liability for your use of this information. Patient Education        Learning About Depression  What is depression?      Depression is an illness that affects how a person feels, thinks, and acts. It's different from normal feelings of sadness or grief. A person who has depression may have less energy. He or she may lose interest in daily activities and may feel sad and grouchy for a long time. Depression is a commonillness. It affects men and women of all ages and backgrounds. Many people, and sometimes their families, feel embarrassed or ashamed about having depression. But it isn't a sign of personal weakness. It's not a character flaw. A person who is depressed is not \"crazy. \" Depression is a medical illness. It's caused by changes in the natural chemicals in the brain. Most experts believe that a combination of family history (a person's genes)and stressful life events can cause depression. Health problems may also cause depression or make it worse. It's common for people with long-term (chronic) health problems like coronary artery disease,diabetes, cancer, or chronic pain to feel depressed. It's important to know that depression can be treated. The first step towardfeeling better is often just seeing that the problem exists. What are the symptoms? The symptoms of depression may be hard to notice at first. They vary among people, and it's easy to confuse them with just feeling \"off. \" The two mostcommon symptoms are:   Feeling sad or hopeless nearly every day for at least 2 weeks.  Losing interest in or not getting pleasure from most activities that used to be enjoyable, and feeling this way nearly every day for at least 2 weeks. Other symptoms may appear. A person with depression may, almost every day:   Eat or sleep more or less than usual.   Feel tired.  Feel unworthy or guilty.  Find it hard to focus, remember things, or make decisions. A serious symptom of depression is thinking about death or suicide. If you or someone you care about talks about this or about feeling hopeless, get helpright away. How is it treated?   Doctors usually treat depression with medicines or counseling. Often a combination of the two works best. Many people don't get help because they think that they'll get over the depression on their own. But some people do notget better without treatment. Antidepressant medicines can improve the symptoms of depression in 1 to 3 weeks. But it can take 6 to 8 weeks to see more improvement. Your doctor willlikely have you keep taking these medicines for at least 6 months. In many cases, counseling can work as well as medicines to treat mild to moderate depression. Counseling is done by licensed mental health providers, such as psychologists and social workers. This kind of treatment deals with howyou think about things and how you act each day. If depression is caused by a medical problem, treating that problem may alsohelp relieve the depression. How can you support someone who has depression? If someone you care about is depressed, you may feel helpless. But there aresome things you can do.  Help the person get treatment or stay with it. This is the best thing you can do.  Support and encourage the person.  Help the person have good health habits. Urge the person to get regular exercise, eat a balanced diet, and get enough sleep.  Take care of yourself. Ask others to give you emotional and practical support while you are helping a friend or loved one who has depression.  Keep the numbers for these national suicide hotlines: 6-211-854-TALK (7-796-166-477-214-3121) and 6-142-SDJXCVO (1-789.691.3877). If you or someone you know talks about suicide or feeling hopeless, get help right away. Where can you learn more? Go to https://trell.Enterra Feed. org and sign in to your Captify account. Enter E147 in the [facility] box to learn more about \"Learning About Depression. \"     If you do not have an account, please click on the \"Sign Up Now\" link.   Current as of: June 16, 2021               Content Version: 13.2  © 7267-3640

## 2022-05-03 ENCOUNTER — HOSPITAL ENCOUNTER (OUTPATIENT)
Dept: MAMMOGRAPHY | Age: 57
Discharge: HOME | End: 2022-05-05
Payer: COMMERCIAL

## 2022-05-03 ENCOUNTER — HOSPITAL ENCOUNTER (OUTPATIENT)
Age: 57
Discharge: HOME | End: 2022-05-03
Payer: COMMERCIAL

## 2022-05-03 DIAGNOSIS — Z12.31 ENCOUNTER FOR SCREENING MAMMOGRAM FOR BREAST CANCER: ICD-10-CM

## 2022-05-03 DIAGNOSIS — R20.0 NUMBNESS AND TINGLING IN BOTH HANDS: ICD-10-CM

## 2022-05-03 DIAGNOSIS — R20.2 NUMBNESS AND TINGLING IN BOTH HANDS: ICD-10-CM

## 2022-05-03 PROCEDURE — 82607 VITAMIN B-12: CPT

## 2022-05-03 PROCEDURE — 36415 COLL VENOUS BLD VENIPUNCTURE: CPT

## 2022-05-03 PROCEDURE — 77063 BREAST TOMOSYNTHESIS BI: CPT

## 2022-05-03 PROCEDURE — 82746 ASSAY OF FOLIC ACID SERUM: CPT

## 2022-05-04 LAB
FOLATE: >20 NG/ML
VITAMIN B-12: 778 PG/ML (ref 232–1245)

## 2022-05-11 RX ORDER — OMEPRAZOLE 10 MG/1
CAPSULE, DELAYED RELEASE ORAL
Qty: 30 CAPSULE | Refills: 1 | Status: SHIPPED | OUTPATIENT
Start: 2022-05-11 | End: 2022-06-08

## 2022-05-11 NOTE — TELEPHONE ENCOUNTER
Please address the medication refill and close the encounter. If I can be of assistance, please route to the applicable pool. Thank you.       Last visit: 4-27-22  Last Med refill: 4-27-22  Does patient have enough medication for 72 hours: No:     Next Visit Date:  Future Appointments   Date Time Provider Dilia Alcantar   6/13/2022  1:00 PM Berna Baldwin, APRN - CNP [facility] [address] Maintenance   Topic Date Due    Colorectal Cancer Screen  Never done    COVID-19 Vaccine (1) 04/03/2023 (Originally 5/1/1970)    DTaP/Tdap/Td vaccine (1 - Tdap) 04/13/2023 (Originally 5/1/1984)    Shingles vaccine (1 of 2) 04/13/2023 (Originally 5/1/2015)    Flu vaccine (Season Ended) 09/01/2022    Depression Monitoring  04/13/2023    Breast cancer screen  05/03/2024    Lipids  04/13/2027    Hepatitis C screen  Completed    HIV screen  Completed    Hepatitis A vaccine  Aged Out    Hepatitis B vaccine  Aged Out    Hib vaccine  Aged Out    Meningococcal (ACWY) vaccine  Aged Out    Pneumococcal 0-64 years Vaccine  Aged Out       Hemoglobin A1C (%)   Date Value   04/13/2022 5.4             ( goal A1C is < 7)   No results found for: LABMICR  LDL Cholesterol (mg/dL)   Date Value   04/13/2022 118       (goal LDL is <100)   AST (U/L)   Date Value   04/13/2022 23     ALT (U/L)   Date Value   04/13/2022 21     BUN (mg/dL)   Date Value   04/13/2022 12     BP Readings from Last 3 Encounters:   04/13/22 113/66          (goal 120/80)    All Future Testing planned in [facility]              Patient Active Problem List:     Encounter for screening mammogram for breast cancer     Chronic bilateral low back pain with left-sided sciatica     Screening for hyperlipidemia     Health care maintenance     Screen for colon cancer     Constipation     Numbness and tingling in both hands     Chronic bilateral low back pain with bilateral sciatica     Gastroesophageal reflux disease without esophagitis

## 2022-05-13 PROBLEM — Z12.11 SCREEN FOR COLON CANCER: Status: RESOLVED | Noted: 2022-04-13 | Resolved: 2022-05-13

## 2022-05-13 PROBLEM — Z13.220 SCREENING FOR HYPERLIPIDEMIA: Status: RESOLVED | Noted: 2022-04-13 | Resolved: 2022-05-13

## 2022-05-13 PROBLEM — Z00.00 HEALTH CARE MAINTENANCE: Status: RESOLVED | Noted: 2022-04-13 | Resolved: 2022-05-13

## 2022-05-13 PROBLEM — Z12.31 ENCOUNTER FOR SCREENING MAMMOGRAM FOR BREAST CANCER: Status: RESOLVED | Noted: 2022-04-13 | Resolved: 2022-05-13

## 2022-05-16 LAB — NONINV COLON CA DNA+OCC BLD SCRN STL QL: NEGATIVE

## 2022-06-01 RX ORDER — CYCLOBENZAPRINE HCL 5 MG
TABLET ORAL
Qty: 28 TABLET | OUTPATIENT
Start: 2022-06-01

## 2022-06-08 RX ORDER — OMEPRAZOLE 10 MG/1
CAPSULE, DELAYED RELEASE ORAL
Qty: 30 CAPSULE | Refills: 1 | Status: SHIPPED | OUTPATIENT
Start: 2022-06-08 | End: 2022-07-12

## 2022-06-08 NOTE — TELEPHONE ENCOUNTER
Please address the medication refill and close the encounter. If I can be of assistance, please route to the applicable pool. Thank you.       Last visit: 4/27/22  Last Med refill: 5-11-22  Does patient have enough medication for 72 hours: Yes    Next Visit Date:  Future Appointments   Date Time Provider Dilia Alcantar   6/13/2022  1:00 PM Eri Bond, APRN - CNP [facility] [address] Maintenance   Topic Date Due    COVID-19 Vaccine (1) 04/03/2023 (Originally 5/1/1970)    DTaP/Tdap/Td vaccine (1 - Tdap) 04/13/2023 (Originally 5/1/1984)    Shingles vaccine (1 of 2) 04/13/2023 (Originally 5/1/2015)    Flu vaccine (Season Ended) 09/01/2022    Depression Monitoring  04/13/2023    Breast cancer screen  05/03/2024    Colorectal Cancer Screen  05/03/2025    Lipids  04/13/2027    Hepatitis C screen  Completed    HIV screen  Completed    Hepatitis A vaccine  Aged Out    Hepatitis B vaccine  Aged Out    Hib vaccine  Aged Out    Meningococcal (ACWY) vaccine  Aged Out    Pneumococcal 0-64 years Vaccine  Aged Out       Hemoglobin A1C (%)   Date Value   04/13/2022 5.4             ( goal A1C is < 7)   No results found for: LABMICR  LDL Cholesterol (mg/dL)   Date Value   04/13/2022 118       (goal LDL is <100)   AST (U/L)   Date Value   04/13/2022 23     ALT (U/L)   Date Value   04/13/2022 21     BUN (mg/dL)   Date Value   04/13/2022 12     BP Readings from Last 3 Encounters:   04/13/22 113/66          (goal 120/80)    All Future Testing planned in [facility]              Patient Active Problem List:     Chronic bilateral low back pain with left-sided sciatica     Constipation     Numbness and tingling in both hands     Chronic bilateral low back pain with bilateral sciatica     Gastroesophageal reflux disease without esophagitis

## 2022-06-13 ENCOUNTER — HOSPITAL ENCOUNTER (OUTPATIENT)
Age: 57
Discharge: HOME | End: 2022-06-15
Payer: MEDICARE

## 2022-06-13 ENCOUNTER — PATIENT MESSAGE (OUTPATIENT)
Dept: NEUROSURGERY | Age: 57
End: 2022-06-13

## 2022-06-13 ENCOUNTER — HOSPITAL ENCOUNTER (OUTPATIENT)
Dept: GENERAL RADIOLOGY | Age: 57
Discharge: HOME | End: 2022-06-15
Payer: MEDICARE

## 2022-06-13 ENCOUNTER — OFFICE VISIT (OUTPATIENT)
Dept: NEUROSURGERY | Age: 57
End: 2022-06-13
Payer: COMMERCIAL

## 2022-06-13 VITALS
OXYGEN SATURATION: 99 % | HEART RATE: 64 BPM | TEMPERATURE: 98.1 F | SYSTOLIC BLOOD PRESSURE: 118 MMHG | RESPIRATION RATE: 18 BRPM | WEIGHT: 175 LBS | HEIGHT: 62 IN | DIASTOLIC BLOOD PRESSURE: 84 MMHG | BODY MASS INDEX: 32.2 KG/M2

## 2022-06-13 DIAGNOSIS — M47.26 OTHER SPONDYLOSIS WITH RADICULOPATHY, LUMBAR REGION: Primary | ICD-10-CM

## 2022-06-13 DIAGNOSIS — M25.552 LEFT HIP PAIN: ICD-10-CM

## 2022-06-13 PROCEDURE — 99204 OFFICE O/P NEW MOD 45 MIN: CPT | Performed by: NURSE PRACTITIONER

## 2022-06-13 PROCEDURE — 73502 X-RAY EXAM HIP UNI 2-3 VIEWS: CPT

## 2022-06-13 NOTE — PROGRESS NOTES
[address]  [facility] [place] [address], [address] [facility] [address]  Dept: [phone]    Patient:  Mary Navarrete  YOB: 1965  Date: 6/13/22    The patient is a 62 y.o. female who presents today for consult of the following problems:     Chief Complaint   Patient presents with    New Patient         HPI:     Mary Navarrete is a 62 y.o. female on whom neurosurgical consultation was requested by Yaima Valdez MD for management of back and left leg pain. Pain has been present for more than a year. Pain averages a 5/10 with current medication regimen, but can reach closer to 10/10 without medication. Pain in the back is described as grinding, \"punching\" pain. Pain occurs constantly, especially while walking. Will have intermittent numbness to 2nd toes on both feet. Has been wearing a brace that helps somewhat. Followed with a chiropractor for around 6 months, completed home exercises for several months as well. Pain started after kane COVID. Pain is present to left side lower back, radiates into hip left groin and down the anterior aspect of her left leg into dorsum of foot. Denies saddle anesthesia, loss of bowel or bladder function. Does have frequent episodes of urinary urgency, especially at night while sleeping, has history of 3 previous bladder suspension surgeries. No bowel issues. Pain can be quite limiting at times, unable to even walk across the grocery store without pain so severe it takes her breath away. Requires frequent rests. Utilizing flexeril and gabapentin. History:     History reviewed. No pertinent past medical history.   Past Surgical History:   Procedure Laterality Date    HYSTERECTOMY (CERVIX STATUS UNKNOWN)      OVARY REMOVAL       Family History   Problem Relation Age of Onset    Asthma Mother     Other Mother         COPD    Cancer Father Lung    Substance Abuse Sister         Clean for a year now    Obesity Sister     No Known Problems Brother     No Known Problems Brother      Current Outpatient Medications on File Prior to Visit   Medication Sig Dispense Refill    omeprazole (PRILOSEC) 10 MG delayed release capsule TAKE 1 CAPSULE BY MOUTH EVERY DAY 30 capsule 1    gabapentin (NEURONTIN) 100 MG capsule Take 1 capsule by mouth 2 times daily for 60 days. Intended supply: 90 days 60 capsule 1    Handicap Placard MISC by Does not apply route Will be valid for 5 year 1 each 0    omeprazole (PRILOSEC OTC) 20 MG tablet Take 1 tablet by mouth daily 30 tablet 3     No current facility-administered medications on file prior to visit. Social History     Tobacco Use    Smoking status: Former Smoker     Packs/day: 1.00     Years: 23.00     Pack years: 23.00     Quit date: 2000     Years since quittin.4    Smokeless tobacco: Never Used   Vaping Use    Vaping Use: Former    Substances: Nicotine    Devices: Pre-filled or refillable cartridge   Substance Use Topics    Alcohol use: Yes     Comment: socially    Drug use: Not Currently     Types: Marijuana (Weed)       Allergies   Allergen Reactions    Penicillins      Nauseous        Review of Systems  Constitutional: Negative for activity change and appetite change. HENT: Negative for ear pain and facial swelling. Eyes: Negative for discharge and itching. Respiratory: Negative for choking and chest tightness. Cardiovascular: Negative for chest pain and leg swelling. Gastrointestinal: Negative for nausea and abdominal pain. Endocrine: Negative for cold intolerance and heat intolerance. Genitourinary: Negative for frequency and flank pain. Musculoskeletal: Negative for myalgias and joint swelling. Skin: Negative for rash and wound. Allergic/Immunologic: Negative for environmental allergies and food allergies. Hematological: Negative for adenopathy.  Does not bruise/bleed easily. Psychiatric/Behavioral: Negative for self-injury. The patient is not nervous/anxious. Physical Exam:      /84 (Site: Left Upper Arm, Position: Sitting, Cuff Size: Medium Adult)   Pulse 64   Temp 98.1 °F (36.7 °C) (Oral)   Resp 18   Ht 5' 2\" (1.575 m)   Wt 175 lb (79.4 kg)   SpO2 99%   BMI 32.01 kg/m²   Estimated body mass index is 32.01 kg/m² as calculated from the following:    Height as of this encounter: 5' 2\" (1.575 m). Weight as of this encounter: 175 lb (79.4 kg). General:  Sasha Kang is a 62y.o. year old female who appears her stated age. HEENT: Normocephalic atraumatic. Neck supple. Chest: regular rate; pulses equal  Abdomen: Soft nontender nondistended. Ext: DP and PT pulses 2+, good cap refill  Neuro    Mentation  Appropriate affect  Registration intact  Orientation intact  Judgement intact to situation    Cranial Nerves:   Pupils equal and reactive to light  Extraocular motion intact  Face and shrug symmetric  Tongue midline  No dysarthria  v1-3 sensation symmetric, masseter tone symmetric  Hearing symmetric    Sensation: Tingling down anterior aspect of left leg    Motor  L deltoid 5/5; R deltoid 5/5  L biceps 5/5; R biceps 5/5  L triceps 5/5; R triceps 5/5  L wrist extension 5/5; R wrist extension 5/5  L intrinsics 5/5; R intrinsics 5/5     L iliopsoas 5/5 , R iliopsoas 5/5  L quadriceps 5/5; R quadriceps 5/5  L Dorsiflexion 5/5; R dorsiflexion 5/5  L Plantarflexion 5/5; R plantarflexion 5/5  L EHL 5/5; R EHL 5/5    Reflexes  L Brachioradialis 2+/4; R brachioradialis 2+/4  L Biceps 2+/4; R Biceps 2+/4  L Triceps 2+/4; R Triceps 2+/4  L Patellar 2+/4: R Patellar 2+/4  L Achilles 2+/4; R Achilles 2+/4    hoffmans L: neg  hoffmans R: neg  Clonus L: neg  Clonus R: neg  Babinski L: neg  Babinski R: neg    +TTP left SI  + Louise Charles left side    Studies Review:     X-ray lumbar spine 4/14/2022:  FINDINGS:   There are 5 non rib-bearing lumbar vertebral bodies.  No acute compression   fractures.  No listhesis.  There is moderate narrowing of the intervertebral   disc space at T10/11 through L1/2. Assessment and Plan:      1. Other spondylosis with radiculopathy, lumbar region    2. Left hip pain          Plan: Proceed with lumbar MRI given failure of conservative measures and persistent severe daily pain. Recommend also obtaining a left hip x-ray as I do suspect that her left hip is playing a component given groin pain, positive Stacey and tenderness over left SI. Rollator order provided to help with ambulation, preventing falls and to allow for frequent breaks. Patient to return in 8 weeks for reevaluation after additional imaging. Followup: Return in about 8 weeks (around 2022), or if symptoms worsen or fail to improve. Prescriptions Ordered:  Orders Placed This Encounter   Medications    Misc. Devices (ROLLER WALKER) MISC     Si each by Does not apply route daily Please dispense Rollater     Dispense:  1 each     Refill:  0      Orders Placed:  Orders Placed This Encounter   Procedures    MRI LUMBAR SPINE WO CONTRAST     Standing Status:   Future     Standing Expiration Date:   2023     Order Specific Question:   Reason for exam:     Answer:   lumbar radiculopathy, failed conservative measures    XR HIP LEFT (2-3 VIEWS)     Standing Status:   Future     Standing Expiration Date:   2022     Order Specific Question:   Reason for exam:     Answer:   left hip/groin pain        Electronically signed by KOBY Gonzalez CNP on 2022 at 1:28 PM    Please note that this chart was generated using voice recognition Dragon dictation software. Although every effort was made to ensure the accuracy of this automated transcription, some errors in transcription may have occurred.

## 2022-06-15 NOTE — TELEPHONE ENCOUNTER
From: Mariusz Reina  To: Shakira Woodson  Sent: 6/13/2022 5:28 PM EDT  Subject: Question regarding XR Hip Left    So in my type of terms what does the result mean

## 2022-06-23 ENCOUNTER — HOSPITAL ENCOUNTER (OUTPATIENT)
Dept: MRI IMAGING | Age: 57
Discharge: HOME | End: 2022-06-25
Payer: MEDICARE

## 2022-06-23 DIAGNOSIS — M47.26 OTHER SPONDYLOSIS WITH RADICULOPATHY, LUMBAR REGION: ICD-10-CM

## 2022-06-23 PROCEDURE — 72148 MRI LUMBAR SPINE W/O DYE: CPT

## 2022-06-29 DIAGNOSIS — R20.2 NUMBNESS AND TINGLING IN BOTH HANDS: ICD-10-CM

## 2022-06-29 DIAGNOSIS — R20.0 NUMBNESS AND TINGLING IN BOTH HANDS: ICD-10-CM

## 2022-06-29 RX ORDER — GABAPENTIN 100 MG/1
CAPSULE ORAL
Qty: 60 CAPSULE | Refills: 2 | Status: SHIPPED | OUTPATIENT
Start: 2022-06-29 | End: 2022-08-24 | Stop reason: ALTCHOICE

## 2022-06-29 NOTE — TELEPHONE ENCOUNTER
E-scribe request for med refills. Please review and e-scribe if applicable.      Last Visit Date:  4/27/2022  Next Visit Date:  Visit date not found    Hemoglobin A1C (%)   Date Value   04/13/2022 5.4             ( goal A1C is < 7)   No results found for: LABMICR  LDL Cholesterol (mg/dL)   Date Value   04/13/2022 118       (goal LDL is <100)   AST (U/L)   Date Value   04/13/2022 23     ALT (U/L)   Date Value   04/13/2022 21     BUN (mg/dL)   Date Value   04/13/2022 12     BP Readings from Last 3 Encounters:   06/13/22 118/84   04/13/22 113/66          (goal 120/80)        Patient Active Problem List:     Chronic bilateral low back pain with left-sided sciatica     Constipation     Numbness and tingling in both hands     Chronic bilateral low back pain with bilateral sciatica     Gastroesophageal reflux disease without esophagitis      ----Maura Friday

## 2022-07-06 ENCOUNTER — OFFICE VISIT (OUTPATIENT)
Dept: NEUROSURGERY | Age: 57
End: 2022-07-06
Payer: MEDICAID

## 2022-07-06 VITALS
BODY MASS INDEX: 32.2 KG/M2 | OXYGEN SATURATION: 99 % | HEIGHT: 62 IN | WEIGHT: 175 LBS | HEART RATE: 64 BPM | DIASTOLIC BLOOD PRESSURE: 76 MMHG | SYSTOLIC BLOOD PRESSURE: 115 MMHG

## 2022-07-06 DIAGNOSIS — M25.551 BILATERAL HIP PAIN: ICD-10-CM

## 2022-07-06 DIAGNOSIS — M25.552 BILATERAL HIP PAIN: ICD-10-CM

## 2022-07-06 DIAGNOSIS — M47.26 OTHER SPONDYLOSIS WITH RADICULOPATHY, LUMBAR REGION: Primary | ICD-10-CM

## 2022-07-06 DIAGNOSIS — M47.816 LUMBAR SPONDYLOSIS: ICD-10-CM

## 2022-07-06 PROCEDURE — 99214 OFFICE O/P EST MOD 30 MIN: CPT | Performed by: NURSE PRACTITIONER

## 2022-07-06 RX ORDER — CYCLOBENZAPRINE HCL 5 MG
5 TABLET ORAL NIGHTLY PRN
Qty: 28 TABLET | Refills: 0 | Status: SHIPPED | OUTPATIENT
Start: 2022-07-06 | End: 2022-07-16

## 2022-07-06 NOTE — PROGRESS NOTES
[address]  [facility] [place] [address], [address] [facility] [address]  Dept: [phone]    Patient:  Daniel Bonilla  YOB: 1965  Date: 6/13/22    The patient is a 62 y.o. female who presents today for consult of the following problems:     Chief Complaint   Patient presents with    Follow-up     spondylosis with radiculopathy, lumbar region         HPI:     Daniel Bonilla is a 62 y.o. female presents for follow-up of low back pain. Pain today 8/10, located to the lower back, described as a \"ripping, pulling\" pain. Denies any numbness tingling to lower extremities presently. Any increase in activity tends to worsen the pain. Tries to utilize stationary bike at home, also follows with chiropractor. Has been completing home stretching exercises for several months without significant improvement. Utilizing Flexeril, gabapentin as well as Aleve to manage the pain. No saddle anesthesia, changes to bowels or bladder. Does have associated bilateral hip pain. Presents today for review of MRI. Does have occasional tingling to left calf extending into left great toe. Also reports an episode of waking up in the middle the night to go to the bathroom with both legs diffusely numb, had to sit and wait for sensation to return prior to going to the bathroom. History:     History reviewed. No pertinent past medical history.   Past Surgical History:   Procedure Laterality Date    HYSTERECTOMY (CERVIX STATUS UNKNOWN)      OVARY REMOVAL       Family History   Problem Relation Age of Onset    Asthma Mother     Other Mother         COPD    Cancer Father         Lung    Substance Abuse Sister         Clean for a year now    Obesity Sister     No Known Problems Brother     No Known Problems Brother      Current Outpatient Medications on File Prior to Visit   Medication Sig Dispense Refill    gabapentin (NEURONTIN) 100 MG capsule TAKE 1 CAPSULE BY MOUTH 2 TIMES DAILY 60 capsule 2    omeprazole (PRILOSEC) 10 MG delayed release capsule TAKE 1 CAPSULE BY MOUTH EVERY DAY 30 capsule 1    Handicap Placard MISC by Does not apply route Will be valid for 5 year 1 each 0    Misc. Devices (ROLLER [place]) MISC 1 each by Does not apply route daily Please dispense Rollater (Patient not taking: Reported on 2022) 1 each 0    omeprazole (PRILOSEC OTC) 20 MG tablet Take 1 tablet by mouth daily 30 tablet 3     No current facility-administered medications on file prior to visit. Social History     Tobacco Use    Smoking status: Former Smoker     Packs/day: 1.00     Years: 23.00     Pack years: 23.00     Quit date: 2000     Years since quittin.5    Smokeless tobacco: Never Used   Vaping Use    Vaping Use: Former    Substances: Nicotine    Devices: Pre-filled or refillable cartridge   Substance Use Topics    Alcohol use: Yes     Comment: socially    Drug use: Not Currently     Types: Marijuana (Weed)       Allergies   Allergen Reactions    Penicillins      Nauseous        Review of Systems  Constitutional: Negative for activity change and appetite change. HENT: Negative for ear pain and facial swelling. Eyes: Negative for discharge and itching. Respiratory: Negative for choking and chest tightness. Cardiovascular: Negative for chest pain and leg swelling. Gastrointestinal: Negative for nausea and abdominal pain. Endocrine: Negative for cold intolerance and heat intolerance. Genitourinary: Negative for frequency and flank pain. Musculoskeletal: Negative for myalgias and joint swelling. Skin: Negative for rash and wound. Allergic/Immunologic: Negative for environmental allergies and food allergies. Hematological: Negative for adenopathy. Does not bruise/bleed easily. Psychiatric/Behavioral: Negative for self-injury. The patient is not nervous/anxious.       Physical Exam: /76   Pulse 64   Ht 5' 2\" (1.575 m)   Wt 175 lb (79.4 kg)   SpO2 99%   BMI 32.01 kg/m²   Estimated body mass index is 32.01 kg/m² as calculated from the following:    Height as of this encounter: 5' 2\" (1.575 m). Weight as of this encounter: 175 lb (79.4 kg). General:  Ruben Kenney is a 62y.o. year old female who appears her stated age. HEENT: Normocephalic atraumatic. Neck supple. Chest: regular rate; pulses equal  Abdomen: Soft nontender nondistended.   Ext: DP and PT pulses 2+, good cap refill  Neuro    Mentation  Appropriate affect  Registration intact  Orientation intact  Judgement intact to situation    Cranial Nerves:   Pupils equal and reactive to light  Extraocular motion intact  Face and shrug symmetric  Tongue midline  No dysarthria  v1-3 sensation symmetric, masseter tone symmetric  Hearing symmetric    Sensation: Intact on exam today    Motor  L deltoid 5/5; R deltoid 5/5  L biceps 5/5; R biceps 5/5  L triceps 5/5; R triceps 5/5  L wrist extension 5/5; R wrist extension 5/5  L intrinsics 5/5; R intrinsics 5/5     L iliopsoas 5/5 , R iliopsoas 5/5  L quadriceps 5/5; R quadriceps 5/5  L Dorsiflexion 5/5; R dorsiflexion 5/5  L Plantarflexion 5/5; R plantarflexion 5/5  L EHL 5/5; R EHL 5/5    Reflexes  L Brachioradialis 2+/4; R brachioradialis 2+/4  L Biceps 2+/4; R Biceps 2+/4  L Triceps 2+/4; R Triceps 2+/4  L Patellar 2+/4: R Patellar 2+/4  L Achilles 2+/4; R Achilles 2+/4    hoffmans L: neg  hoffmans R: neg  Clonus L: neg  Clonus R: neg  Babinski L: neg  Babinski R: neg    +TTP left SI  + Stacey bilaterally  + Straight leg raise left side, approximate L5/S1 distribution    Studies Review:     MRI lumbar 6/23/2022:  FINDINGS:   BONES/ALIGNMENT: Vertebral body heights are maintained.  There is   age-appropriate bone marrow signal.  There is multilevel degenerative disc   disease with loss of disc signal. Candi Section is no significant spondylolisthesis.       SPINAL CORD: Conus is normal in caliber and signal and terminates at the L2   level.  The cauda equina is unremarkable.       SOFT TISSUES: Posterior paraspinal soft tissues are unremarkable.  The   visualized abdominal structures are unremarkable.       L1-L2: There is a circumferential disc bulge.  There is no canal stenosis. There is mild foraminal narrowing.       L2-L3: There is a circumferential disc bulge.  There is no canal stenosis. There is mild foraminal narrowing.       L3-L4: There is a circumferential disc bulge with facet and ligamentous   hypertrophy.  There is canal stenosis measuring 7 mm in AP dimension.  There   is mild right foraminal narrowing and no significant left foraminal narrowing.       L4-L5: There is a circumferential disc bulge with facet and ligamentous   hypertrophy.  There is canal stenosis measuring 6 mm in AP dimension.  There   is no significant foraminal narrowing.       L5-S1: There is a circumferential disc bulge.  There is no canal stenosis or   right foraminal narrowing.  There is mild left foraminal narrowing. X-ray left hip 6/13/2022:  FINDINGS:   Mild osteophyte spurring at the margins of the left hip joint space.  Left   hip joint space well maintained.  Mild degenerative changes of the pubic   symphysis.  Pelvic phleboliths.  Mild stool burden.  Mild degenerative   changes in the lower lumbar spine.  Left femoral head projects over the left   acetabulum without clear evidence for acute fracture, dislocation or femoral   head flattening. Assessment and Plan:      1. Other spondylosis with radiculopathy, lumbar region    2. Lumbar spondylosis    3. Bilateral hip pain          Plan: MRI images reviewed with patient, multilevel degenerative changes, with mild to moderate central stenosis at L3-4, L4-L5. Left hip x-ray with mild arthritic changes. Recommend initiation of multimodal physical therapy.   Referral additionally provided for pain management evaluation for consideration of lumbar epidural versus medial branch blocks. We will see the patient back in 10 to 12 weeks for reevaluation. Flexeril sent to use as needed as requested. Followup: Return in about 3 months (around 10/6/2022), or if symptoms worsen or fail to improve. Prescriptions Ordered:  Orders Placed This Encounter   Medications    cyclobenzaprine (FLEXERIL) 5 MG tablet     Sig: Take 1 tablet by mouth nightly as needed for Muscle spasms     Dispense:  28 tablet     Refill:  0      Orders Placed:  Orders Placed This Encounter   Procedures   [facility]     Referral Priority:   Routine     Referral Type:   Eval and Treat     Referral Reason:   Specialty Services Required     Requested Specialty:   Physical Therapist     Number of Visits Requested:   [facility]     Referral Priority:   Routine     Referral Type:   Eval and Treat     Referral Reason:   Specialty Services Required     Requested Specialty:   Pain Management     Number of Visits Requested:   1        Electronically signed by KOBY Christopher CNP on 7/6/2022 at 10:59 AM    Please note that this chart was generated using voice recognition Dragon dictation software. Although every effort was made to ensure the accuracy of this automated transcription, some errors in transcription may have occurred.

## 2022-07-12 RX ORDER — OMEPRAZOLE 10 MG/1
CAPSULE, DELAYED RELEASE ORAL
Qty: 30 CAPSULE | Refills: 1 | Status: SHIPPED | OUTPATIENT
Start: 2022-07-12 | End: 2022-09-15 | Stop reason: SDUPTHER

## 2022-07-12 NOTE — TELEPHONE ENCOUNTER
Last visit: 04/24/2022  Last Med refill:   Does patient have enough medication for 72 hours: No:     Next Visit Date:  Future Appointments   Date Time Provider Dilia Alcantar   7/13/2022 12:00 PM Rossy Bundy PT [facility]   8/18/2022 12:40 PM Kandi Ramos MD [address]   9/28/2022 10:30 AM Norman Spencer, APRN - CNP [facility] [address] Maintenance   Topic Date Due    Annual Wellness Visit (AWV)  Never done    COVID-19 Vaccine (1) 04/03/2023 (Originally 5/1/1970)    DTaP/Tdap/Td vaccine (1 - Tdap) 04/13/2023 (Originally 5/1/1984)    Shingles vaccine (1 of 2) 04/13/2023 (Originally 5/1/2015)    Flu vaccine (1) 09/01/2022    Depression Monitoring  04/13/2023    Breast cancer screen  05/03/2024    Colorectal Cancer Screen  05/03/2025    Lipids  04/13/2027    Hepatitis C screen  Completed    HIV screen  Completed    Hepatitis A vaccine  Aged Out    Hepatitis B vaccine  Aged Out    Hib vaccine  Aged Out    Meningococcal (ACWY) vaccine  Aged Out    Pneumococcal 0-64 years Vaccine  Aged Out       Hemoglobin A1C (%)   Date Value   04/13/2022 5.4             ( goal A1C is < 7)   No results found for: LABMICR  LDL Cholesterol (mg/dL)   Date Value   04/13/2022 118       (goal LDL is <100)   AST (U/L)   Date Value   04/13/2022 23     ALT (U/L)   Date Value   04/13/2022 21     BUN (mg/dL)   Date Value   04/13/2022 12     BP Readings from Last 3 Encounters:   07/06/22 115/76   06/13/22 118/84   04/13/22 113/66          (goal 120/80)    All Future Testing planned in [facility]              Patient Active Problem List:     Chronic bilateral low back pain with left-sided sciatica     Constipation     Numbness and tingling in both hands     Chronic bilateral low back pain with bilateral sciatica     Gastroesophageal reflux disease without esophagitis

## 2022-07-13 ENCOUNTER — HOSPITAL ENCOUNTER (OUTPATIENT)
Dept: PHYSICAL THERAPY | Age: 57
Setting detail: THERAPIES SERIES
Discharge: HOME | End: 2022-07-13
Payer: MEDICARE

## 2022-07-13 PROCEDURE — 97161 PT EVAL LOW COMPLEX 20 MIN: CPT

## 2022-07-13 PROCEDURE — 97110 THERAPEUTIC EXERCISES: CPT

## 2022-07-13 NOTE — CONSULTS
[x] [facility]) [facility] [place] [place] &  Therapy  [address]  [phone]  F: [phone] [] [address]  [facility]   [address]  P: [phone]  F: [phone] [] [place]  [address]  P: [phone]  F: [phone] [] [address]  P: [phone]  F: [phone] [] [address]  [facility]   [address]   [place]: [phone]  F: [phone]        Physical Therapy Spine Evaluation    Date:  2022  Patient: Lili Shaw    : 1965  MRN: [id number]  Physician: Leslie Rodriguez CNP     Insurance: Earma Pedlar (Based on [employer];  / Based on [employer], $40 co-pay covered by secondary insurance- Medicaid)    Medical Diagnosis: Lumbar spondylosis (M47.816 [ICD-10-CM]); Other spondylosis with radiculopathy, lumbar region (M47.26 [ICD-10-CM]); Bilateral hip pain (M25.551,M25.552 [ICD-10-CM])   Rehab Codes:  M47.816, M47.26, M25.551,M25.552, M62.81, R26.89 M25.652  Onset Date: referral 22     Next 's appt. : 22      Subjective:   CC/HPI: Patient is a 61 y/o female presenting to PT clinic with low back pain that has been getting worse over the last year. She reports hx of back pain for the last 10 years, on disability currently for back pain. She reports that the pain started in her groin, she initiated chiropractic care with minimal relief. Was living up in [place], now since moving down to [place] she wants to get further treatment. She reports that pain has since worsened and increased in the L posterior lateral hip and L thigh. Continues to ride her stationary bike for 1 hour per day. Also completed 30 leg stretches at night prior to going to sleep.  She is wearing a postural support brace to her upper back this date. Also has a few other braces at home that she will intermittently wear. Has appointment with pain management on 8/18/22. Daughter reports an episode of UE \"locking up\" secondary to nerve damage. Patient does not drive because of this. Has hx of doing therapy approx 15 years ago. PMHx:   [] Unremarkable               [x] Refer to full medical chart  In EPIC     Past Surgical History         Laterality Date Comments   Hysterectomy [SHX81]      Ovary removal [SHX86]          Comorbidities:   [x] Obesity [] Dialysis  [] N/A   [] Asthma/COPD [] Dementia [] Other:   [] Stroke [] Sleep apnea [] Other:   [] Vascular disease [] Rheumatic disease [] Other:       Tests: [x] X-Ray:    X-ray left hip 6/13/2022:  FINDINGS:   Mild osteophyte spurring at the margins of the left hip joint space.  Left   hip joint space well maintained.  Mild degenerative changes of the pubic   symphysis.  Pelvic phleboliths.  Mild stool burden.  Mild degenerative   changes in the lower lumbar spine.  Left femoral head projects over the left   acetabulum without clear evidence for acute fracture, dislocation or femoral   head flattening. [x] MRI:    MRI lumbar 6/23/2022:      Impression   Multilevel degenerative disc disease with associated facet and ligamentous   hypertrophy with canal stenosis most pronounced at L3-4 and L4-5.       Foraminal narrowing as described above.       [] Other:       Medications: [x] Refer to full medical record [] None [] Other:  Allergies:      [x] Refer to full medical record [] None [] Other:       ADL/IADL [x] Previously independent with all [] Currently independent with all Who currently assists the patient with task    [] Previously independent with all except: [] Currently independent with all except:    Bathing  [] Assist [] Assist    Dress/grooming [] Assist [] Assist    Transfer/mobility [] Assist [] Assist    Feeding [] Assist [] Assist    Toileting [] Assist [] Assist    Driving [] Assist [x] Assist Daughter    Housekeeping [] Assist [x] Assist Daughter    Grocery shop/meal prep [] Assist [x] Assist Daughter        Gait Prior level of function Current level of function    [x] Independent  [] Assist [x] Independent  [] Assist   Device: [x] Independent [x] Independent    [] Straight Cane [] Quad cane [] Straight Cane [] Quad cane    [] Standard walker [] Rolling walker   [] 4 wheeled walker [] Standard walker [] Rolling walker   [] 4 wheeled walker    [] Wheelchair [] Wheelchair         Function:  Hand Dominance  [x] Right  [] Left  Marital Status Lives alone    Home type Apartment -2nd floor   Uses elevator    Stairs from outside Full flight - uses elevator    Stairs inside 0   Employement On disability    Job status --   Work Activities/duties  --   Recreational Activities Reading, Walking, riding bike        Pain present? Yes    Location Low back    Pain Rating currently 8/10   Pain at worse 9/10   Pain at best 4/10    Description of pain Constant achiness in her L hip/leg   Low back pain is stabbing   Burning in posterior hip      Altered Sensation Numbness to bilateral arms - 2 middle fingers    What makes it worse Everything    What makes it better Medications - gabipentin, aleve    Symptom progression Unchanged since onset    Sleep Sleeps on R side- fetal position           Objective:   STRENGTH    Left Right   L1-2 Hip Flex 4 4   Hip Abd 3+ 4-   Hip ext  3 3+   L3-4 Knee Ext 5 5   L4 Ankle DF 5 5   L5 EHL     S1 Plant.  Flex 5 5   Abdominals 3+    Erector Spinae     PPT from 90 to=         Lumbar ROM Left Right   Flexion [facility]     Extension Dec by 50%*      Rotation  [facility]   Sidebend  Dec by 25%* Dec by 25%*   UE/LE      Hip flex   [facility]   Hip ER   60 60   Hip IR   18 35   Hip ext  Dec by 50% Dec by 25%   Pain with posterior and anterior pelvic tilting in sitting     TESTS (+/-) LEFT RIGHT Not Tested   SLR supine - - []   Hamstring (SLR) - - []   SKTC + - []   DKTC   [x]   Slump/Dural - - []   SI JT   [x]   SAIRA + (back pain)  []   Joint Mobility   [x]   Cerv. Comp   [x]   Cerv. Distraction   [x]   Cerv. Alar/Transverse   [x]   Vertebral Artery   [x]   Adsons   [x]   Adriana Graham   [x]       OBSERVATION No Deficit Deficit Not Tested Comments   Posture       Forward Head [] [x] []    Rounded Shoulders [] [x] []    Kyphosis [] [x] []    Lordosis [] [x] [] Decreased lumbar lordosis in sitting    Lateral Shift [] [x] [] L lateral shift in standing    Scoliosis [] [] [x]    Iliac Crest [x] [] []    PSIS [x] [] []    ASIS [x] [] []    Genu Valgus [x] [] []    Genu Varus [x] [] []    Genu Recurvatum [x] [] []    Pronation [x] [] []    Supination [x] [] []    Leg Length Discrp [x] [] []    Slumped sitting [] [x] []    Palpation [] [x] [] Mild pain to palpation of the bilateral SI joints , otherwise minimal reproducible pain    Sensation [x] [] []    Edema [x] [] []    Neurological [x] [] []    Gait  [] [x] [] Decreased step distance bilaterally, decreased speed        Flexibility Normal Left tight Right tight Comments    Hamstring [x] [] []    Hip flexor [] [x] [x] L>R   Quad [x] [] []    Piriformis [] [x] [] See IR ROM   ITB [x] [] []    Gastroc/Soleus [x] [] []        FUNCTION Normal Difficult Unable   Sitting [x] [] []   Standing [] [x] []   Ambulation [] [x] []   Groom/Dress [x] [] []   Lift/Carry [] [] [x]   Stairs [] [x] []   Bending [] [x] []   OH reach [] [x] []   Sit to Stand [] [x] []         Functional Test: Modified Oswestry Low Back Pain Disability Questionnaire  Score: 76% functionally impaired       Comments:      Assessment: 61 y/o female presents to PT clinic with low back pain and L upper leg pain that has been going on for over 1 year. Has painful lumbar mobility. Tightness present to the L hip flexor and L piriformis muscles, affecting L hip mobility. Core and hip weakness present.  Patient educated on proper use of postural assistance with external braces, encouraged to only use for short periods when lifting or with prolonged walking. Lengthy discussion on home exercises and frequency/time of day to complete the exercises. Provided HEP this date. Patient would benefit from skilled physical therapy services in order to: Improve posture, improve core stabilization, improve hip mobility and strength, and decrease pain to ease ADL's and improve quality of life     Problems:    [x] ? Pain: 4-9/10 low back and leg pain   [x] ? ROM: decreased L hip mobility, painful lumbar ROM   [x] ? Strength: decrease hip (L>R) and core strength   [x] ? Function: 76% impairment on Modified Oswestry Low Back Pain Questionnaire, poor posture    [] Other:         Goals  MET NOT MET ON-  GOING  Details   Date Addressed:        STG: To be met in 10 treatments           1. ? Pain: Decrease low back and leg pain levels to 4/10 with ADLs []  []  []      2. ? ROM: Increase lumbar AROM to [facility] without pain to reduce difficulty with ADLs []  []  []      3. Patient to demonstrate active hip extension without lumbar compensations  []  []  []     3. ? Strength: Increase LE MMT to at least 4+/5 throughout to ease functional limitations and mobility  []  []  []     4. Demonstrate improve dynamic standing posture with minimal forward head and rounded shoulders  []  []  []     5. Independent with Home Exercise Programs []  []  []      []  []  []     Date Addressed:        LTG: To be met in 16 treatments       1. Improve score on assessment tool Modified Oswestry Low Back Pain Questionnaire  from 76% impairment to less than 40% impairment  []  []  []     2. Reduce low back and leg pain levels to 2/10 or less with ADLs []  []  []     3.  Patient to report the ability to go a week without use of external postural brace  []  []  []                Patient goals: pain free     Rehab Potential:  [x] Good  [] Fair  [] Poor   Suggested Professional Referral:  [x] No  [] Yes:  Barriers to Goal Achievement[de-identified]  [x] No  [] Yes:  Domestic Concerns: [x] No  [] Yes:    Pt. Education:  [x] Plans/Goals, Risks/Benefits discussed  [x] Home exercise program  Method of Education: [x] Verbal  [x] Demo  [x] Written    Access Code: [email or web address]  URL: [email or web address]  Date: 07/13/2022  Prepared by: Ambrosio Brown    Exercises  Supine Transversus Abdominis Bracing - Hands on Stomach - 1-2 x daily - 7 x weekly - 3 sets - 10 reps  Supine Posterior Pelvic Tilt - 1-2 x daily - 7 x weekly - 3 sets - 10 reps  Supine March - 1-2 x daily - 7 x weekly - 3 sets - 10 reps  Clamshell - 1-2 x daily - 7 x weekly - 3 sets - 10 reps  Prone Gluteal Sets - 1-2 x daily - 7 x weekly - 10 reps - 5 hold  Modified Bola Stretch - 1-2 x daily - 7 x weekly - 1-2 min hold      Comprehension of Education:  [x] Verbalizes understanding. [x] Demonstrates understanding. [x] Needs Review. [] Demonstrates/verbalizes understanding of HEP/Ed previously given. Treatment Plan:  [x] Therapeutic Exercise   66639  [] Iontophoresis: 4 mg/mL Dexamethasone Sodium Phosphate  mAmin  43404   [x] Therapeutic Activity  80273 [] Vasopneumatic cold with compression  02612    [] Gait Training   49851 [] Ultrasound   21448   [x] Neuromuscular Re-education  52769 [] Electrical Stimulation Unattended  19269   [x] Manual Therapy  69312 [] Electrical Stimulation Attended  29836   [x] Instruction in HEP  [] Lumbar/Cervical Traction  11938   [] Aquatic Therapy   21413 [x] Cold/hotpack    [] Massage   70144      [] Dry Needling, 1 or 2 muscles  57662   [] Biofeedback, first 15 minutes   68021  [] Biofeedback, additional 15 minutes   48652 [] Dry Needling, 3 or more muscles  46124       []  Medication allergies reviewed for use of    Dexamethasone Sodium Phosphate 4mg/ml     with iontophoresis treatments. Pt is not allergic.     Frequency:  2 x/week for 16 visits    Todays Treatment:  Modalities:   Precautions:   Exercise     Reps/ Time Weight/ Level Comments         NuStep    With UE         Calf Stretch              Posterior pelvic tilts 5x5\"     Posterior pelvic tilts with march  2x4     Posterior pelvic tilts with bent knee fall out             Hip Flexor Stretch  2'  bilat    Piriformis Stretch   Next            Gluteal sets -prone  10x5\"     Prone Hip Extension       Clamshells x10     Sidelying hip abd       Bridges             4-way hip       Total Gym Squats             Other: education on gradually limiting her use of lumbar and thoracic back braces throughout her day     Specific Instructions for next treatment: hip and core strength, L piriformis stretch, standing program, postural education      Evaluation Complexity:  History (Personal factors, comorbidities) [] 0 [x] 1-2 [] 3+   Exam (limitations, restrictions) [] 1-2 [] 3 [x] 4+   Clinical presentation (progression) [x] Stable [] Evolving  [] Unstable   Decision Making [x] Low [] Moderate [] High    [x] Low Complexity [] Moderate Complexity [] High Complexity       Treatment Charges: Mins Units   [x] Evaluation       [x]  Low       []  Moderate       []  High 40 1   []  Modalities     [x]  Ther Exercise 10 1   []  Manual Therapy     []  Ther Activities     []  Aquatics     []  Vasocompression     [x]  There Act  5 --     TOTAL TREATMENT TIME: 55 min     Time in: 11:45a      Time out: 12:40p    Electronically signed by: Tanya Levine PT    Physician Signature:________________________________Date:__________________  By signing above or cosigning this note, I have reviewed this plan of care and certify a need for medically necessary rehabilitation services.      *PLEASE SIGN ABOVE AND FAX BACK ALL PAGES*

## 2022-07-18 ENCOUNTER — HOSPITAL ENCOUNTER (OUTPATIENT)
Dept: PHYSICAL THERAPY | Age: 57
Setting detail: THERAPIES SERIES
Discharge: HOME | End: 2022-07-18
Payer: MEDICARE

## 2022-07-18 PROCEDURE — 97110 THERAPEUTIC EXERCISES: CPT

## 2022-07-18 NOTE — FLOWSHEET NOTE
[x] [facility]) [facility] &  Therapy  [address]  [phone]  F: [phone] [] [address]  [facility]   [address]  P: [phone]  F: [phone] [] [address]  [address]  P: [phone]  F: [phone] [] [address]  P: [phone]  F: [phone] [] [address]  [facility]   [address]   [place]: [phone]  F: [phone]      Physical Therapy Daily Treatment Note    Date:  2022  Patient Name:  Binh Moctezuma    :  1965  MRN: [id number]  Physician: Vandana Felix CNP                                   Insurance: Carlito Lal (Based on [employer];  / Based on [employer], $40 co-pay covered by secondary insurance- Medicaid)    Medical Diagnosis: Lumbar spondylosis (M47.816 [ICD-10-CM]); Other spondylosis with radiculopathy, lumbar region (M47.26 [ICD-10-CM]); Bilateral hip pain (M25.551,M25.552 [ICD-10-CM])              Rehab Codes:  M47.816, M47.26, M25.551,M25.552, M62.81, R26.89 M25.652  Onset Date: referral 22                                                   Next 's appt. : 22  Visit# / total visits: ; Progress note for Medicare patient due at visit 10th     Cancels/No Shows: 0/0    Subjective:    Pain:  [x] Yes  [] No Location: low back  Pain Rating: (0-10 scale) 8/10  Pain altered Tx:  [x] No  [] Yes  Action:  Comments: Had 3 good days of therapy. Yesterday had increased pain in low back 10/10. Pain 8/10 today. Noted some antalgic gait pattern noted this date.     Objective:  Modalities:   Precautions:  Exercises:  Exercise Reps/ Time Weight/ Level Comments   SciFit 5 mins L1 With UE         Standing      Hip abduction 15x     Hip extension 15x     Hip flexion 15x     HR 15x improve dynamic standing posture with minimal forward head and rounded shoulders  []  []  []      5. Independent with Home Exercise Programs []  []  []        []  []  []      Date Addressed:           LTG: To be met in 16 treatments           1. Improve score on assessment tool Modified Oswestry Low Back Pain Questionnaire  from 76% impairment to less than 40% impairment  []  []  []      2. Reduce low back and leg pain levels to 2/10 or less with ADLs []  []  []      3. Patient to report the ability to go a week without use of external postural brace  []  []  []                        Patient goals: pain free       Pt. Education:  [x] Yes  [] No  [x] Reviewed Prior HEP/Ed  Method of Education: [x] Verbal  [x] Demo  [] Written  Comprehension of Education:  [x] Verbalizes understanding. [x] Demonstrates understanding. [x] Needs review. [] Demonstrates/verbalizes HEP/Ed previously given. Plan: [x] Continue current frequency toward long and short term goals.     [x] Specific Instructions for subsequent treatments: hip and core strength, L piriformis stretch, standing program, postural education      Time In: 11:04 am           Time Out: 11:44 am    Electronically signed by:  Chriss Riedel, PTA

## 2022-07-21 ENCOUNTER — HOSPITAL ENCOUNTER (OUTPATIENT)
Dept: PHYSICAL THERAPY | Age: 57
Setting detail: THERAPIES SERIES
Discharge: HOME | End: 2022-07-21
Payer: MEDICARE

## 2022-07-21 NOTE — FLOWSHEET NOTE
[x] [facility] ([facility]) - [facility] &  Therapy  [address]    [phone]  F: [phone]   [] [address]  [facility] 36   [address]  P: [phone]  F: [phone]  [] [address] &  Therapy  [address]  P: [phone]  F: [phone] [] [address]  P: [phone]  F: [phone]  [] [address] [facility]   [address]   [place]: [phone]  F: [phone]   [] [address]  [place]: [phone]   F: [phone]     Physical Therapy Cancel/No Show note    Date: 2022  Patient: Yolanda Fraser  : 1965  MRN: [id number]    Cancels/No Shows to date:     For today's appointment patient:    [x]  Cancelled    [] Rescheduled appointment    [] No-show     Reason given by patient:    []  Patient ill    []  Conflicting appointment    [] No transportation      [] Conflict with work    [] No reason given    [] Weather related    [] COVID-19    [x] Other:      Comments:  \"family issues\"       [x] Next appointment was confirmed    Electronically signed by: Ambrosio Brown PT

## 2022-07-25 ENCOUNTER — HOSPITAL ENCOUNTER (OUTPATIENT)
Dept: PHYSICAL THERAPY | Age: 57
Setting detail: THERAPIES SERIES
Discharge: HOME | End: 2022-07-25
Payer: MEDICARE

## 2022-07-25 PROCEDURE — 97110 THERAPEUTIC EXERCISES: CPT

## 2022-07-25 PROCEDURE — 97140 MANUAL THERAPY 1/> REGIONS: CPT

## 2022-07-25 NOTE — FLOWSHEET NOTE
[x] [facility]) [facility] &  Therapy  [address]  [phone]  F: [phone] [] [address]  [facility]   [address]  P: [phone]  F: [phone] [] [address]  [address]  P: [phone]  F: [phone] [] [address]  P: [phone]  F: [phone] [] [address]  [facility]   [address]   [place]: [phone]  F: [phone]      Physical Therapy Daily Treatment Note    Date:  2022  Patient Name:  Rubina Schmitt    :  1965  MRN: [id number]  Physician: Peng Cox CNP                                   Insurance: [employer]ens[employer] (Based on Affinity;  / Based on Affinity, $40 co-pay covered by secondary insurance- Medicaid)    Medical Diagnosis: Lumbar spondylosis (M47.816 [ICD-10-CM]); Other spondylosis with radiculopathy, lumbar region (M47.26 [ICD-10-CM]); Bilateral hip pain (M25.551,M25.552 [ICD-10-CM])              Rehab Codes:  M47.816, M47.26, M25.551,M25.552, M62.81, R26.89 M25.652  Onset Date: referral 22                                                   Next 's appt. : 22  Visit# / total visits: 3/16; Progress note for Medicare patient due at visit 10th     Cancels/No Shows: 0/0    Subjective:    Pain:  [x] Yes  [] No Location: left hip  Pain Rating: (0-10 scale) 5/10  Pain altered Tx:  [x] No  [] Yes  Action:  Comments: Reports that she is having a \"bad\" day due to increased pain in her left hip.      Objective:  Modalities:   Precautions: Standard   Exercises:  Exercise Reps/ Time Weight/ Level Comments   SciFit 5 mins L2 With UE         Standing      Hip abduction 10x [place]      Hip extension 10x Orange     Hip flexion 10x Orange     HR 15x     Gastroc stretch 3x 30 sec wedge Total gym squats 2x10 L35  Added 7/25          Supine      LTR 10x 5 sec    Posterior pelvic tilt 5x 5 sec    TrA Contraction 10x5\"   Added 7/25    Posterior pelvic tilts with march 2x10  Ensure TrA contraction    Posterior pelvic tilt with bent knee fall out x10   Ensure TrA contraction    Piriformis stretch 3x 30 sec towel   Bridges 15x  Inc reps 7/25    Hip flexor stretch   home         Sidelying      Clamshells 12x A    Hip abduction 10x           Prone      Gluteal Sets  10x5\"      Hip extension 10x ea  10x nee straight, 10x knee bent                      Other: Manual direct inhibition to the L obturator muscles of hip in prone position with passive hip IR following x10       Treatment Charges: Mins Units   []  Modalities     [x]  Ther Exercise 43 3   [x]  Manual Therapy 10 1   []  Ther Activities     []  Aquatics     []  Vasocompression     []  Other     Total Treatment time 53 4       Assessment: [x] Progressing toward goals. Though patient reports having a \"bad\" day, pain rating was less than previous visit. Continued program with addition of resistance band for 4-way hip. Tightness noted along the deep external rotators, applied manual to decrease muscle spasms with passive hip stretching to follow. Ensured transverse abdominis contraction this date with patient preferring to use a self-feedback method. Encouraged this for hookyling exercises at home. Daughter questioning use of back brace, encouraged to wear as needed, though practice transverse abdominis contraction upon removal. She reports decreased hip pain following, though her back is feeling worse. Encouraged strengthening in addition to patient preferred exercise being walking at home. [] No change. [] Other:  [x] Patient would continue to benefit from skilled physical therapy services in order to:   Improve posture, improve core stabilization, improve hip mobility and strength, and decrease pain to ease ADL's and improve quality of life Goals MET NOT MET ON-  GOING Details   Date Addressed:           STG: To be met in 10 treatments            1. ? Pain: Decrease low back and leg pain levels to 4/10 with ADLs []  []  []      2. ? ROM: Increase lumbar AROM to [facility] without pain to reduce difficulty with ADLs []  []  []      3. Patient to demonstrate active hip extension without lumbar compensations  []  []  []      3. ? Strength: Increase LE MMT to at least 4+/5 throughout to ease functional limitations and mobility []  []  []      4. Demonstrate improve dynamic standing posture with minimal forward head and rounded shoulders  []  []  []      5. Independent with Home Exercise Programs []  []  []        []  []  []      Date Addressed:           LTG: To be met in 16 treatments           1. Improve score on assessment tool Modified Oswestry Low Back Pain Questionnaire  from 76% impairment to less than 40% impairment  []  []  []      2. Reduce low back and leg pain levels to 2/10 or less with ADLs []  []  []      3. Patient to report the ability to go a week without use of external postural brace  []  []  []                        Patient goals: pain free       Pt. Education:  [x] Yes  [] No  [x] Reviewed Prior HEP/Ed  Method of Education: [x] Verbal  [x] Demo  [] Written    Access Code: [email or web address]  URL: [email or web address]  Date: 07/25/2022  Prepared by: Sho Garcia    Exercises  Supine Transversus Abdominis Bracing - Hands on Stomach - 1 x daily - 7 x weekly - 3 sets - 10 reps  Standing Hip Abduction with Anchored Resistance - 1 x daily - 7 x weekly - 10 reps - 3 sets  Standing Hip Extension with Anchored Resistance - 1 x daily - 7 x weekly - 10 reps - 3 sets  Standing Repeated Hip Flexion with Resistance - 1 x daily - 7 x weekly - 10 reps - 3 sets  Supine Bridge - 1 x daily - 7 x weekly - 3 sets - 10 reps  Prone Hip Extension - 1 x daily - 7 x weekly - 2 sets - 10 reps  Prone Hip Extension with Bent Knee - 1 x daily - 7 x weekly - 2 sets - 10 reps      Access Code: [email or web address]  URL: [email or web address]  Date: 07/13/2022  Prepared by: Eloy Peralta     Exercises  Supine Transversus Abdominis Bracing - Hands on Stomach - 1-2 x daily - 7 x weekly - 3 sets - 10 reps  Supine Posterior Pelvic Tilt - 1-2 x daily - 7 x weekly - 3 sets - 10 reps  Supine March - 1-2 x daily - 7 x weekly - 3 sets - 10 reps  Clamshell - 1-2 x daily - 7 x weekly - 3 sets - 10 reps  Prone Gluteal Sets - 1-2 x daily - 7 x weekly - 10 reps - 5 hold  Modified Bola Stretch - 1-2 x daily - 7 x weekly - 1-2 min hold     Comprehension of Education:  [x] Verbalizes understanding. [x] Demonstrates understanding. [x] Needs review. [] Demonstrates/verbalizes HEP/Ed previously given. Plan: [x] Continue current frequency toward long and short term goals.     [x] Specific Instructions for subsequent treatments: hip and core strength, L piriformis stretch, standing program, postural education      Time In: 11:47 am           Time Out: 12:45 pm    Electronically signed by:  Eloy Peralta, PT

## 2022-07-28 ENCOUNTER — HOSPITAL ENCOUNTER (OUTPATIENT)
Dept: PHYSICAL THERAPY | Age: 57
Setting detail: THERAPIES SERIES
Discharge: HOME | End: 2022-07-28
Payer: MEDICARE

## 2022-07-28 NOTE — FLOWSHEET NOTE
[x] [address] [facility] &  Therapy  [address]    [phone]  F: [phone]   [] [address] [facility] [address]   [address]  P: [phone]  F: [phone]  [] [address]  [address] [facility]  P: [phone]  F: [phone] [] [address]  P: [phone]  F: [phone]  [] [address]  [facility]   [address]   [place]: [phone]  F: [phone]   [] [place] [address] [facility] [address]  [place]: [phone]   F: [phone]     Physical Therapy Cancel/No Show note    Date: 2022  Patient: Nicolás Corral  : 1965  MRN: [id number]    Cancels/No Shows to date:     For today's appointment patient:    [x]  Cancelled    [] Rescheduled appointment    [] No-show     Reason given by patient:    []  Patient ill    []  Conflicting appointment    [] No transportation      [] Conflict with work    [] No reason given    [] Weather related    [] COVID-19    [x] Other: Patient called this morning stating she needed to cancel PT appointments as she can not afford co-insurance. Called patient later in morning about this as referral notes (see below). Patient stated that she called Medicaid this morning as she keeps getting bills, medicaid noted the only were paying insurance premiums. Asked patient to call insurance again and ask if they cover co-pays,etc also. Patient once again called back and stated she had everything straightened out and can continue PT. Will start on Monday as she already cancelled and has no transportation now.    Insurance: [employer] MEDICARE              Eligibility Status:  Eligible        Secondary Insurance (if applicable) [place]ICA[place] Eligibility Status: Eligible        DOS: 7/13  # of visits allowed/remaining: Based on med nec;  / Based on med nec;  Source: [employer] To: Availity /Onesource  Reference: [id number] / [id number]  Auth: Essie yr; visit limit based on med nec; $40 Copay; [id number]. 43remain; No Ded/Coinsurance     Medicaid - Constantino yr; visit limit based on med nec;  No patient liability     Comments:        [x] Next appointment was confirmed    Electronically signed by: Cammy Ospina PTA

## 2022-08-01 ENCOUNTER — HOSPITAL ENCOUNTER (OUTPATIENT)
Dept: PHYSICAL THERAPY | Age: 57
Setting detail: THERAPIES SERIES
Discharge: HOME | End: 2022-08-01
Payer: MEDICARE

## 2022-08-01 ENCOUNTER — TELEPHONE (OUTPATIENT)
Dept: BARIATRICS/WEIGHT MGMT | Age: 57
End: 2022-08-01

## 2022-08-01 PROCEDURE — 97110 THERAPEUTIC EXERCISES: CPT

## 2022-08-01 NOTE — FLOWSHEET NOTE
[x] [facility] &  Therapy  [address]  [phone]  F: [phone] [] [address]  [facility]   [address]  P: [phone]  F: [phone] [] [address]  [address]  P: [phone]  F: [phone] [] [address]  P: [phone]  F: [phone] [] [address]  [facility]   [address]   [place]: [phone]  F: [phone]      Physical Therapy Daily Treatment Note    Date:  2022  Patient Name:  Cristi Butcher    :  1965  MRN: [id number]  Physician: Placido Stevens CNP                                   Insurance: [employer] (Based on Hitch;  / Based on Hitch, $40 co-pay covered by secondary insurance- Medicaid)    Medical Diagnosis: Lumbar spondylosis (M47.816 [ICD-10-CM]); Other spondylosis with radiculopathy, lumbar region (M47.26 [ICD-10-CM]); Bilateral hip pain (M25.551,M25.552 [ICD-10-CM])              Rehab Codes:  M47.816, M47.26, M25.551,M25.552, M62.81, R26.89 M25.652  Onset Date: referral 22                                                   Next 's appt. : 22  Visit# / total visits: ; Progress note for Medicare patient due at visit 10th     Cancels/No Shows: 0/0    Subjective:    Pain:  [x] Yes  [] No Location: left hip  Pain Rating: (0-10 scale) 5/10  Pain altered Tx:  [x] No  [] Yes  Action:  Comments: Reports that she has mild pain rating it a 5/10 describes as a \"pulling\". She reports that she drove to therapy today. Reports that she was able to walk 3 blocks on Saturday.      Objective:  Modalities:   Precautions: Standard   Exercises:  Exercise Reps/ Time Weight/ Level Comments   SciFit 5 mins L2 With UE         Standing      Hip abduction 15x [place]      Hip extension 15x Orange     Hip flexion 15x Orange     HR 15x     Gastroc stretch 3x 30 sec wedge   Leg Press  X15  x15 30#  40# Added 8/1   Increased weight after first set   Total gym squats 2x10 L35  Added 7/25    Squat tapping mat table   x10           Supine      LTR 10x 5 sec    Posterior pelvic tilt 5x 5 sec    TrA Contraction 10x5\"   Added 7/25    Posterior pelvic tilts with march 2x10  Ensure TrA contraction    Posterior pelvic tilt with bent knee fall out x10   Ensure TrA contraction    Piriformis stretch 3x 30 sec towel   Bridges 15x  Inc reps 7/25    Hip flexor stretch   home         Sidelying      Clamshells 15x A Add resistance next if able    Hip abduction 15x A          Prone      Gluteal Sets  10x5\"      Hip extension 10x ea  10x nee straight, 10x knee bent          PhysioBall    Added 8/1   Posterior pelvic tilts x5     Marching x5     Alternating UE raises  x5           Other: Manual direct inhibition to the L obturator muscles of hip in prone position with passive hip IR following x10       Treatment Charges: Mins Units   []  Modalities     [x]  Ther Exercise 46 3   []  Manual Therapy     []  Ther Activities     []  Aquatics     []  Vasocompression     []  Other     Total Treatment time 46 3       Assessment: [x] Progressing toward goals. Patient with good recall on standing program this date, discussed completion at home as patient voices that she does not have a good doorway to use at home. Patient self-increased her squat repetitions on the total gym machine. Continues to tolerate more exercises and increased repetitions. Added core stabilization program on Physioball as patient has one at home and would like to utilize it. Postural cueing required throughout. Reports that she feels she is continuing to get better. [] No change. [] Other:  [x] Patient would continue to benefit from skilled physical therapy services in order to:   Improve posture, improve core stabilization, improve hip mobility and strength, and decrease pain to ease ADL's and improve quality of life       Goals MET NOT MET ON-  GOING Details   Date Addressed:           STG: To be met in 10 treatments            1. ? Pain: Decrease low back and leg pain levels to 4/10 with ADLs []  []  []      2. ? ROM: Increase lumbar AROM to [facility] without pain to reduce difficulty with ADLs []  []  []      3. Patient to demonstrate active hip extension without lumbar compensations  []  []  []      3. ? Strength: Increase LE MMT to at least 4+/5 throughout to ease functional limitations and mobility []  []  []      4. Demonstrate improve dynamic standing posture with minimal forward head and rounded shoulders  []  []  []      5. Independent with Home Exercise Programs []  []  []        []  []  []      Date Addressed:           LTG: To be met in 16 treatments           1. Improve score on assessment tool Modified Oswestry Low Back Pain Questionnaire  from 76% impairment to less than 40% impairment  []  []  []      2. Reduce low back and leg pain levels to 2/10 or less with ADLs []  []  []      3. Patient to report the ability to go a week without use of external postural brace  []  []  []                        Patient goals: pain free       Pt. Education:  [x] Yes  [] No  [x] Reviewed Prior HEP/Ed  Method of Education: [x] Verbal  [x] Demo  [] Written    Access Code: [email or web address]  URL: [email or web address] Brand. com/  Date: 07/25/2022  Prepared by: Lencho Filter    Exercises  Supine Transversus Abdominis Bracing - Hands on Stomach - 1 x daily - 7 x weekly - 3 sets - 10 reps  Standing Hip Abduction with Anchored Resistance - 1 x daily - 7 x weekly - 10 reps - 3 sets  Standing Hip Extension with Anchored Resistance - 1 x daily - 7 x weekly - 10 reps - 3 sets  Standing Repeated Hip Flexion with Resistance - 1 x daily - 7 x weekly - 10 reps - 3 sets  Supine Bridge - 1 x daily - 7 x weekly - 3 sets - 10 reps  Prone Hip Extension - 1 x daily - 7 x weekly - 2 sets - 10 reps  Prone Hip Extension with Bent Knee - 1 x daily - 7 x weekly - 2 sets - 10 reps      Access Code: [email or web address]  URL: [email or web address] com/  Date: 07/13/2022  Prepared by: Lissy Coughlin     Exercises  Supine Transversus Abdominis Bracing - Hands on Stomach - 1-2 x daily - 7 x weekly - 3 sets - 10 reps  Supine Posterior Pelvic Tilt - 1-2 x daily - 7 x weekly - 3 sets - 10 reps  Supine March - 1-2 x daily - 7 x weekly - 3 sets - 10 reps  Clamshell - 1-2 x daily - 7 x weekly - 3 sets - 10 reps  Prone Gluteal Sets - 1-2 x daily - 7 x weekly - 10 reps - 5 hold  Modified Bola Stretch - 1-2 x daily - 7 x weekly - 1-2 min hold     Comprehension of Education:  [x] Verbalizes understanding. [x] Demonstrates understanding. [x] Needs review. [] Demonstrates/verbalizes HEP/Ed previously given. Plan: [x] Continue current frequency toward long and short term goals.     [x] Specific Instructions for subsequent treatments: hip and core strength, L piriformis stretch, standing program, postural education      Time In: 11:46 am           Time Out: 12:37 pm    Electronically signed by:  Lissy Coughlin, PT

## 2022-08-02 ENCOUNTER — TELEPHONE (OUTPATIENT)
Dept: FAMILY MEDICINE CLINIC | Age: 57
End: 2022-08-02

## 2022-08-02 NOTE — TELEPHONE ENCOUNTER
[facility] contacted office to see if patient should follow up after physical therapy. Orders not placed by our provider, pt is due for appt in 07/27/22. ECC will jose pt appt.

## 2022-08-04 ENCOUNTER — HOSPITAL ENCOUNTER (OUTPATIENT)
Dept: PHYSICAL THERAPY | Age: 57
Setting detail: THERAPIES SERIES
Discharge: HOME | End: 2022-08-04
Payer: MEDICARE

## 2022-08-04 PROCEDURE — 97110 THERAPEUTIC EXERCISES: CPT

## 2022-08-04 RX ORDER — OMEPRAZOLE 10 MG/1
CAPSULE, DELAYED RELEASE ORAL
Qty: 30 CAPSULE | Refills: 1 | OUTPATIENT
Start: 2022-08-04

## 2022-08-04 NOTE — FLOWSHEET NOTE
[x] [facility]) [facility] &  Therapy  [address]  [phone]  F: [phone] [] [address]  [place] [address]   [address]  P: [phone]  F: [phone] [] [address]  [address]  P: [phone]  F: [phone] [] [email or web address]  P: [phone]  F: [phone] [] [address]  [facility]   [address]   [place]: [phone]  F: [phone]      Physical Therapy Daily Treatment Note    Date:  2022  Patient Name:  Nay Whatley    :  1965  MRN: [id number]  Physician: Marisa Sanford CNP                                   Insurance: [employer] (Based on Stootie;  / Based on [place], $40 co-pay covered by secondary insurance- Medicaid)    Medical Diagnosis: Lumbar spondylosis (M47.816 [ICD-10-CM]); Other spondylosis with radiculopathy, lumbar region (M47.26 [ICD-10-CM]); Bilateral hip pain (M25.551,M25.552 [ICD-10-CM])              Rehab Codes:  M47.816, M47.26, M25.551,M25.552, M62.81, R26.89 M25.652  Onset Date: referral 22                                                   Next 's appt. : 22  Visit# / total visits: ; Progress note for Medicare patient due at visit 10th     Cancels/No Shows: 0/0    Subjective:    Pain:  [x] Yes  [] No Location: left hip  Pain Rating: (0-10 scale) 0/10  Pain altered Tx:  [x] No  [] Yes  Action:  Comments: Reports soreness in her legs and describes that her legs \"feel like jello. \" Unsure if this was from last session or from sitting when driving to [place] the other day.      Objective:  Modalities:   Precautions: Standard   Exercises:  Exercise Reps/ Time Weight/ Level Comments Completed 22     SciFit 5 mins L3 With UE x          Standing       Hip abduction 15x [place]    x   Hip extension 15x [place]   x   Hip flexion 15x [place]   x   HR 20x   x   Gastroc stretch 3x 30 sec wedge x   Leg Press  X15  x15 30#  40# Added 8/1   Increased weight after first set    Total gym squats 2x10 L35  Added 7/25  x   Squat tapping mat table   x10             Supine       LTR 10x 5 sec     Posterior pelvic tilt 5x 5 sec     TrA Contraction 10x5\"   Added 7/25     Posterior pelvic tilts with march 2x10  Ensure TrA contraction     Posterior pelvic tilt with bent knee fall out x10   Ensure TrA contraction     90/90 abdominal 3x20\"  Added 8/4 x   Piriformis stretch 3x 30 sec  x   Bridges 2x10   On red Pball x   Hip flexor stretch              Sidelying       Clamshells 10x lime Add resistance next if able  x   Hip abduction 15x A            Prone       Gluteal Sets  10x5\"    x   Hip extension 10x ea  10x nee straight, 10x knee bent  x   Scap retraction 10x5\"   x                        PhysioBall    Added 8/1    Posterior pelvic tilts x10   x   Marching x15   x   Alternating UE raises  x10   x          Other:       Treatment Charges: Mins Units   []  Modalities     [x]  Ther Exercise 43 3   []  Manual Therapy     []  Ther Activities     []  Aquatics     []  Vasocompression     []  Other     Total Treatment time 43 3       Assessment: [x] Progressing toward goals. She was printed additional core stabilization exercises on the Pball. Held Leg press as this was added last session and she may have experienced post-exercise soreness following this exercise. Patient comfortable going down to once per week starting next week with instruction to continue her HEP. [] No change. [] Other:  [x] Patient would continue to benefit from skilled physical therapy services in order to:   Improve posture, improve core stabilization, improve hip mobility and strength, and decrease pain to ease ADL's and improve quality of life       Goals MET NOT MET ON-  GOING Details   Date Addressed:           STG: To be met in 10 treatments            1. ? Pain: Decrease low back and leg pain levels to 4/10 with ADLs []  []  []      2. ? ROM: Increase lumbar AROM to [facility] without pain to reduce difficulty with ADLs []  []  []      3. Patient to demonstrate active hip extension without lumbar compensations  []  []  []      3. ? Strength: Increase LE MMT to at least 4+/5 throughout to ease functional limitations and mobility []  []  []      4. Demonstrate improve dynamic standing posture with minimal forward head and rounded shoulders  []  []  []      5. Independent with Home Exercise Programs []  []  []        []  []  []      Date Addressed:           LTG: To be met in 16 treatments           1. Improve score on assessment tool Modified Oswestry Low Back Pain Questionnaire  from 76% impairment to less than 40% impairment  []  []  []      2. Reduce low back and leg pain levels to 2/10 or less with ADLs []  []  []      3. Patient to report the ability to go a week without use of external postural brace  []  []  []                        Patient goals: pain free       Pt. Education:  [x] Yes  [] No  [x] Reviewed Prior HEP/Ed  Method of Education: [x] Verbal  [x] Demo  [] Written    Access Code: [email or web address]  URL: "[employer]. com/  Date: 07/25/2022  Prepared by: Rickey Devine    Exercises  Supine Transversus Abdominis Bracing - Hands on Stomach - 1 x daily - 7 x weekly - 3 sets - 10 reps  Standing Hip Abduction with Anchored Resistance - 1 x daily - 7 x weekly - 10 reps - 3 sets  Standing Hip Extension with Anchored Resistance - 1 x daily - 7 x weekly - 10 reps - 3 sets  Standing Repeated Hip Flexion with Resistance - 1 x daily - 7 x weekly - 10 reps - 3 sets  Supine Bridge - 1 x daily - 7 x weekly - 3 sets - 10 reps  Prone Hip Extension - 1 x daily - 7 x weekly - 2 sets - 10 reps  Prone Hip Extension with Bent Knee - 1 x daily - 7 x weekly - 2 sets - 10 reps      Access Code: [email or web address]  URL: [email or web address]. com/  Date: 07/13/2022  Prepared by: Deion Post     Exercises  Supine Transversus Abdominis Bracing - Hands on Stomach - 1-2 x daily - 7 x weekly - 3 sets - 10 reps  Supine Posterior Pelvic Tilt - 1-2 x daily - 7 x weekly - 3 sets - 10 reps  Supine March - 1-2 x daily - 7 x weekly - 3 sets - 10 reps  Clamshell - 1-2 x daily - 7 x weekly - 3 sets - 10 reps  Prone Gluteal Sets - 1-2 x daily - 7 x weekly - 10 reps - 5 hold  Modified Bola Stretch - 1-2 x daily - 7 x weekly - 1-2 min hold     Comprehension of Education:  [x] Verbalizes understanding. [x] Demonstrates understanding. [x] Needs review. [] Demonstrates/verbalizes HEP/Ed previously given. Plan: [x] Continue current frequency toward long and short term goals.     [x] Specific Instructions for subsequent treatments: progress to lime tband for 4-way hip, hip and core strength, L piriformis stretch, standing program, postural education      Time In: 11:42 am           Time Out: 12:30 pm    Electronically signed by:  Deion Post, PT

## 2022-08-04 NOTE — TELEPHONE ENCOUNTER
Please address the medication refill and close the encounter. If I can be of assistance, please route to the applicable pool. Thank you.     Last visit: 4-27-22  Last Med refill: 4-27-22  Does patient have enough medication for 72 hours: No:     Next Visit Date:  Future Appointments   Date Time Provider Dilia Alcantar   8/4/2022 12:00 PM Nela Zakiya, PT [facility]   8/8/2022 11:45 AM [place] Cassette, PTA [facility]   8/11/2022 12:00 PM Nela [place], PT [facility]   8/18/2022 12:40 PM Subhash Patel MD [address]   8/25/2022  2:30 PM Terrence Caldera MD [address] [facility]   9/28/2022 10:30 AM Bekah Deviis Serum, APRN - CNP [facility] [address] Maintenance   Topic Date Due    Annual Wellness Visit (AWV)  Never done    COVID-19 Vaccine (1) 04/03/2023 (Originally 1965)    DTaP/Tdap/Td vaccine (1 - Tdap) 04/13/2023 (Originally 5/1/1984)    Shingles vaccine (1 of 2) 04/13/2023 (Originally 5/1/2015)    Flu vaccine (1) 09/01/2022    Depression Monitoring  04/13/2023    Breast cancer screen  05/03/2024    Colorectal Cancer Screen  05/03/2025    Lipids  04/13/2027    Hepatitis C screen  Completed    HIV screen  Completed    Hepatitis A vaccine  Aged Out    Hepatitis B vaccine  Aged Out    Hib vaccine  Aged Out    Meningococcal (ACWY) vaccine  Aged Out    Pneumococcal 0-64 years Vaccine  Aged Out       Hemoglobin A1C (%)   Date Value   04/13/2022 5.4             ( goal A1C is < 7)   No results found for: LABMICR  LDL Cholesterol (mg/dL)   Date Value   04/13/2022 118       (goal LDL is <100)   AST (U/L)   Date Value   04/13/2022 23     ALT (U/L)   Date Value   04/13/2022 21     BUN (mg/dL)   Date Value   04/13/2022 12     BP Readings from Last 3 Encounters:   07/06/22 115/76   06/13/22 118/84   04/13/22 113/66          (goal 120/80)    All Future Testing planned in [facility]              Patient Active Problem List:     Chronic bilateral low back pain with left-sided sciatica     Constipation     Numbness and tingling in both hands     Chronic bilateral low back pain with bilateral sciatica     Gastroesophageal reflux disease without esophagitis

## 2022-08-07 ENCOUNTER — PATIENT MESSAGE (OUTPATIENT)
Dept: NEUROSURGERY | Age: 57
End: 2022-08-07

## 2022-08-08 ENCOUNTER — APPOINTMENT (OUTPATIENT)
Dept: PHYSICAL THERAPY | Age: 57
End: 2022-08-08
Payer: MEDICARE

## 2022-08-08 ENCOUNTER — TELEPHONE (OUTPATIENT)
Dept: FAMILY MEDICINE CLINIC | Age: 57
End: 2022-08-08

## 2022-08-08 NOTE — TELEPHONE ENCOUNTER
Would like a letter to be moved from one apartment to other due to the trash bin that she lives next to that makes noise all night and she can't sleep. She states she is in a lot of pain.

## 2022-08-08 NOTE — LETTER
[address]  [place] [address]  Phone: [phone]  Fax: [phone]    Theola Fothergill, MD        August 10, 2022     Patient: Mario Meza   YOB: 1965   Date of Visit: 8/8/2022       To Whom It May Concern: In my opinion Ms. Edi Olea will be benefited from switching apartments as the current accommodation is interfering with her daily activities. If you have any questions or concerns, please don't hesitate to call.     Sincerely,        Theola Fothergill, MD

## 2022-08-09 ENCOUNTER — HOSPITAL ENCOUNTER (OUTPATIENT)
Dept: PHYSICAL THERAPY | Age: 57
Setting detail: THERAPIES SERIES
Discharge: HOME | End: 2022-08-09
Payer: MEDICARE

## 2022-08-09 NOTE — FLOWSHEET NOTE
[x] [facility]) [facility] &  Therapy  [address]    [phone]  F: [phone]   [] [address]  [facility] [address]   [address]  P: [phone]  F: [phone]  [] [address]  [address]  P: [phone]  F: [phone] [] [address]  P: [phone]  F: [phone]  [] [address] [facility]   [address]   [place]: [phone]  F: [phone]   [] [place] [address] [facility] [address]  [place]: [phone]   F: [phone]     Physical Therapy Cancel/No Show note    Date: 2022  Patient: Raul Haddad  : 1965  MRN: [id number]    Cancels/No Shows to date:  (cancel not counted against patient, called 24 hours in advance)     For today's appointment patient:    [x]  Cancelled    [] Rescheduled appointment    [] No-show     Reason given by patient:    []  Patient ill    []  Conflicting appointment    [] No transportation      [] Conflict with work    [] No reason given    [] Weather related    [] COVID-19    [x] Other: Pet                    [x] Next appointment was confirmed    Electronically signed by: Karma Hernandez PT

## 2022-08-10 ENCOUNTER — TELEPHONE (OUTPATIENT)
Dept: FAMILY MEDICINE CLINIC | Age: 57
End: 2022-08-10

## 2022-08-11 ENCOUNTER — APPOINTMENT (OUTPATIENT)
Dept: PHYSICAL THERAPY | Age: 57
End: 2022-08-11
Payer: MEDICARE

## 2022-08-15 ENCOUNTER — HOSPITAL ENCOUNTER (OUTPATIENT)
Dept: PHYSICAL THERAPY | Age: 57
Setting detail: THERAPIES SERIES
Discharge: HOME | End: 2022-08-15
Payer: MEDICARE

## 2022-08-15 PROCEDURE — 97110 THERAPEUTIC EXERCISES: CPT

## 2022-08-15 NOTE — FLOWSHEET NOTE
[x] [facility] &  Therapy  [address]  [phone]  F: [phone] [] [address]  [employer] [address]   [address]  P: [phone]  F: [phone] [] [address]  [address]  P: [phone]  F: [phone] [] [address]  P: [phone]  F: [phone] [] [address]  [facility]   [address]   [place]: [phone]  F: [phone]      Physical Therapy Daily Treatment and Progress Note    Date:  8/15/2022  Patient Name:  Daljit Schmitt    :  1965  MRN: [id number]  Physician: Tal Patel CNP                                   Insurance: Dante Gross (Based on med Radene Novalact;  / Based on med Radene Pandy, $40 co-pay covered by secondary insurance- Medicaid)    Medical Diagnosis: Lumbar spondylosis (M47.816 [ICD-10-CM]); Other spondylosis with radiculopathy, lumbar region (M47.26 [ICD-10-CM]); Bilateral hip pain (M25.551,M25.552 [ICD-10-CM])              Rehab Codes:  M47.816, M47.26, M25.551,M25.552, M62.81, R26.89 M25.652  Onset Date: referral 22                                                   Next 's appt.: 22- pain management   Visit# / total visits: ; Progress note for Medicare patient due at visit 10th     Cancels/No Shows: 0/0    Subjective:    Pain:  [x] Yes  [] No Location: left hip  Pain Rating: (0-10 scale) 0/10  Pain altered Tx:  [x] No  [] Yes  Action:  Comments: Reports mild stiffness upon waking, stretches relieved her pain. Reports that she has been walking a lot more with her son. Has been riding her bike for an hour at a time. Stopped taking her gabipentin and muscle relaxants about a month ago. Used her external low back brace once during a long walk.  Reports that her pain gets up to 2/10 at its highest over the last few weeks. Objective:  Modalities:   Precautions: Standard   Exercises:  Exercise Reps/ Time Weight/ Level Comments Completed 08/15/22     SciFit 5 mins L3 With UE x          Standing       Hip abduction 15x Lime     x   Hip extension 15x Lime    x   Hip flexion 15x Lime    x   Hip adduction  15x  Lime      HR 20x   x   Gastroc stretch 3x 30 sec wedge x   Leg Press  X15  x15 30#  40# Added 8/1   Increased weight after first set    Total gym squats 2x10 L35  Added 7/25     Squat tapping mat table   x15   x          Supine       LTR 10x 5 sec     Posterior pelvic tilt 10x 5 sec  x   TrA Contraction 10x5\"   Added 7/25     Posterior pelvic tilts with march 2x10  Ensure TrA contraction     Posterior pelvic tilt with bent knee fall out x10   Ensure TrA contraction     90/90 abdominal 3x20\"  Added 8/4    Piriformis stretch 3x 30 sec     Bridges 2x10   On red Pball    Hip flexor stretch              Sidelying       Clamshells 20x lime  x   Hip abduction 15x A            Prone       Gluteal Sets  10x5\"       Hip extension 20x ea  knee bent  x   Scap retraction 10x5\"                           PhysioBall    Added 8/1    Posterior pelvic tilts x10      Marching x15      Alternating UE raises  x10             Other:       STRENGTH     Left Right   L1-2 Hip Flex 5 5   Hip Abd 4 4   Hip ext 4+ 4   L3-4 Knee Ext 5 5   L4 Ankle DF 5 5   L5 EHL       S1 Plant.  Flex 5 5         Lumbar ROM Left Right   Flexion WFL       Extension WFL*       Rotation   WFL WFL   Sidebend   WFL WFL   UE/LE         Hip flex    WFL WFL   Hip ER    63 66   Hip IR    42 45   Hip ext   [facility]       Functional Test: Modified Oswestry Low Back Pain Disability Questionnaire  Score: 38% functionally impaired            Treatment Charges: Mins Units   []  Modalities     [x]  Ther Exercise 38 3   []  Manual Therapy     []  Ther Activities     []  Aquatics     []  Vasocompression     []  Other     Total Treatment time 38 3 Assessment: [x] Progressing toward goals. Increased resistance on 4-way hip this date, complaints of \"cramping\" requiring intermittent rest breaks. Educated patient that this is likely fatigue setting in, and she should return to the exercise once the fatigue resolves. Completed formal goal assessment as patient feels she can continue her exercises at home. She demonstrates significant improvement in mobility and strength, with only mild hip abduction weakness present. Transverse abdominis contraction improved, with patient more self-aware of the contraction needed for lumbar support. Less reliance on her external braces. Discussed continued need for strength in her hips and importance of continuing her HEP. She has appointment with pain management this week. Patient to be placed on hold until the end of the month, with instruction to contact with any concerns or increased pain warranting another treatment visit. Patient voices understanding. [] No change. [x] Other: To place patient on hold at this time with instructed to continue on HEP. [] Patient would continue to benefit from skilled physical therapy services in order to: Improve posture, improve core stabilization, improve hip mobility and strength, and decrease pain to ease ADL's and improve quality of life       Goals MET NOT MET ON-  GOING Details   Date Addressed: 8/15/22           STG: To be met in 10 treatments            1. ? Pain: Decrease low back and leg pain levels to 4/10 with ADLs [x]  []  []     2. ? ROM: Increase lumbar AROM to [facility] without pain to reduce difficulty with ADLs []  [x]  []   Mild pain with extension, motion WFL    3. Patient to demonstrate active hip extension without lumbar compensations  [x]  []  []      4. ? Strength: Increase LE MMT to at least 4+/5 throughout to ease functional limitations and mobility []  [x]  []  Mild hip abduction weakness bilaterally    5.  Demonstrate improve dynamic standing posture with minimal forward head and rounded shoulders  []  [x]  [x]   Mild forward head and rounded shoulders    6. Independent with Home Exercise Programs [x]  []  []        []  []  []      Date Addressed: 8/15/22           LTG: To be met in 16 treatments           1. Improve score on assessment tool Modified Oswestry Low Back Pain Questionnaire from 76% impairment to less than 40% impairment  [x]  []  []  38% impairment     2. Reduce low back and leg pain levels to 2/10 or less with ADLs [x]  []  []   Pain only up to a 2/10 at its worst over the last few weeks    3. Patient to report the ability to go a week without use of external postural brace  []  [x]  [x]  Has used once in the last few weeks, when going on a long walk                      Patient goals: pain free       Pt. Education:  [x] Yes  [] No  [x] Reviewed Prior HEP/Ed  Method of Education: [x] Verbal  [x] Demo  [] Written    Defers additional exercise printouts, provided with lime tbands for HEP. Access Code: [email or web address]  URL: [employer]/  Date: 07/25/2022  Prepared by: Kayla Rosado    Exercises  Supine Transversus Abdominis Bracing - Hands on Stomach - 1 x daily - 7 x weekly - 3 sets - 10 reps  Standing Hip Abduction with Anchored Resistance - 1 x daily - 7 x weekly - 10 reps - 3 sets  Standing Hip Extension with Anchored Resistance - 1 x daily - 7 x weekly - 10 reps - 3 sets  Standing Repeated Hip Flexion with Resistance - 1 x daily - 7 x weekly - 10 reps - 3 sets  Supine Bridge - 1 x daily - 7 x weekly - 3 sets - 10 reps  Prone Hip Extension - 1 x daily - 7 x weekly - 2 sets - 10 reps  Prone Hip Extension with Bent Knee - 1 x daily - 7 x weekly - 2 sets - 10 reps      Access Code: [email or web address]  URL: [employer]/  Date: 07/13/2022  Prepared by: Kayla Rosado     Exercises  Supine Transversus Abdominis Bracing - Hands on Stomach - 1-2 x daily - 7 x weekly - 3 sets - 10 reps  Supine Posterior Pelvic Tilt - 1-2 x daily - 7 x weekly - 3 sets - 10 reps  Supine March - 1-2 x daily - 7 x weekly - 3 sets - 10 reps  Clamshell - 1-2 x daily - 7 x weekly - 3 sets - 10 reps  Prone Gluteal Sets - 1-2 x daily - 7 x weekly - 10 reps - 5 hold  Modified Bola Stretch - 1-2 x daily - 7 x weekly - 1-2 min hold     Comprehension of Education:  [x] Verbalizes understanding. [x] Demonstrates understanding. [x] Needs review. [] Demonstrates/verbalizes HEP/Ed previously given. Plan: [] Continue current frequency toward long and short term goals. [] Specific Instructions for subsequent treatments: progress to lime tband for 4-way hip, hip and core strength, L piriformis stretch, standing program, postural education  [x] Patient to be placed on hold until 8/31/22 secondary to improved symptoms and independence with HEP. If no contact at that time, patient to be discharged.          Time In: 11:00 am           Time Out: 11:41 pm    Electronically signed by:  Carlito Smith PT

## 2022-08-24 ENCOUNTER — OFFICE VISIT (OUTPATIENT)
Dept: FAMILY MEDICINE CLINIC | Age: 57
End: 2022-08-24
Payer: MEDICARE

## 2022-08-24 VITALS
WEIGHT: 179 LBS | SYSTOLIC BLOOD PRESSURE: 120 MMHG | BODY MASS INDEX: 32.74 KG/M2 | DIASTOLIC BLOOD PRESSURE: 78 MMHG | HEART RATE: 73 BPM

## 2022-08-24 DIAGNOSIS — Z76.89 ENCOUNTER FOR WEIGHT MANAGEMENT: ICD-10-CM

## 2022-08-24 DIAGNOSIS — E78.00 HYPERCHOLESTEREMIA: Primary | ICD-10-CM

## 2022-08-24 DIAGNOSIS — R63.4 WEIGHT LOSS: ICD-10-CM

## 2022-08-24 PROCEDURE — 99213 OFFICE O/P EST LOW 20 MIN: CPT | Performed by: STUDENT IN AN ORGANIZED HEALTH CARE EDUCATION/TRAINING PROGRAM

## 2022-08-24 RX ORDER — PHENTERMINE HYDROCHLORIDE 37.5 MG/1
37.5 CAPSULE ORAL EVERY MORNING
Qty: 30 CAPSULE | Refills: 0 | Status: SHIPPED | OUTPATIENT
Start: 2022-08-24 | End: 2022-09-19 | Stop reason: SDUPTHER

## 2022-08-24 RX ORDER — ATORVASTATIN CALCIUM 10 MG/1
10 TABLET, FILM COATED ORAL DAILY
Qty: 30 TABLET | Refills: 3 | Status: SHIPPED | OUTPATIENT
Start: 2022-08-24 | End: 2022-09-15

## 2022-08-24 NOTE — PROGRESS NOTES
[facility] [address]    Family Medicine Residency Program - Outpatient Note      Subjective:      Yolanda Fraser is a 62 y.o. female  presented to the office on 08/24/22 with complaints of: Weight management    This is a 60-year-old female with a history of sleeve gastrectomy, bilateral salpingo-oophorectomy, history of inguinal/umbilical hernia repair, history of bladder suspension surgeries came in today for weight management. Patient denies any history of heart attacks, stroke, arrhythmia, chest pain. Patient tried Adipex almost a decade ago before gastric bypass surgery. Patient is already following dietary restriction and is physically active but despite of that she is gaining weight since the pandemic. Patient bariatric surgeon is in [place] and the [place]ariatric surgery in [place] did not have any further recommendation. Review of systems (Except what is mentioned in the HPI)  CONSTITUTIONAL: Weight gain  RESPIRATORY: Negative  CARDIOVASCULAR: Negative  GASTROINTESTINAL: Negative  GENITOURINARY: Negative   MUSCULOSKELETAL: Negative  NEUROLOGICAL: Negative  BEHAVIOR/PSYCH: Negative      Objective:      Vitals:    08/24/22 1447   BP: 120/78   Pulse: 73       General Appearance - Alert and oriented x 3  HEENT - No obvious deformity  Lungs - Bilateral good air entry , no wheezes or rales  Cardiovascular - Regular rate and rhythm. No murmur  Abdomen - Soft and nontender  Neurologic - No new focal motor or sensory deficits  Skin - No bruising or bleeding on exposed skin area  MSK - No new joint/bone pains   Psych - normal affect       Assessment:        ICD-10-CM    1. Hypercholesteremia  E78.00 atorvastatin (LIPITOR) 10 MG tablet      2. Weight loss  R63.4 Urine Drug Screen      3.  Encounter for weight management  Z76.89 phentermine (ADIPEX-P) 37.5 MG capsule     Urine Drug [address] [place] [address] [place]:    Weight during this encounter 179 pound, will start patient on Adipex, order UDS. We will follow patient every 4 weeks and total of 12 weeks therapy. The patient about the side effects including tachycardia high blood pressure, insomnia and anxiety. Patient understand and agrees with the plan. Return in about 4 weeks (around 9/21/2022), or weight loss managment. Requested Prescriptions     Signed Prescriptions Disp Refills    atorvastatin (LIPITOR) 10 MG tablet 30 tablet 3     Sig: Take 1 tablet by mouth daily    phentermine (ADIPEX-P) 37.5 MG capsule 30 capsule 0     Sig: Take 1 capsule by mouth every morning for 30 days. Medications Discontinued During This Encounter   Medication Reason    omeprazole (PRILOSEC OTC) 20 MG tablet LIST CLEANUP    Misc. Devices (ROLLER WALKER) MISC LIST CLEANUP    gabapentin (NEURONTIN) 100 MG capsule Therapy completed       Juanita Campbell received counseling on the following healthy behaviors: nutrition, exercise and medication adherence    Discussed use, benefit, and side effects of prescribed medications. Barriers to medication compliance addressed. All patient questions answered. Pt voiced understanding. Disclaimer: Some oral of this note was transcribed using voice-recognition software. This may cause typographical errors occasionally. Although all effort is made to fix these errors, please do not hesitate to contact our office if there Sandro Achilles concern with the understanding of this note.     Terrence Salas  PGY-3  Family Medicine     8/24/2022  4:31 PM

## 2022-08-24 NOTE — PROGRESS NOTES
Attending Physician Statement  I have discussed the care of Arelis Osborn 62 y.o. female, including pertinent history and exam findings, with the resident Dr. Nolberto Bruno MD.    History and Exam:   Chief Complaint   Patient presents with    Weight Gain     Asking about starting adipex to help get weight off        No past medical history on file. Allergies   Allergen Reactions    Penicillins      Nauseous       I have seen and examined the patient and the key elements of the encounter have been performed by me. BP Readings from Last 3 Encounters:   08/24/22 120/78   07/06/22 115/76   06/13/22 118/84     /78   Pulse 73   Wt 179 lb (81.2 kg)   BMI 32.74 kg/m²   Lab Results   Component Value Date    WBC 7.0 04/13/2022    HGB 14.5 04/13/2022    HCT 44.2 04/13/2022     04/13/2022    CHOL 211 (H) 04/13/2022    TRIG 153 (H) 04/13/2022    HDL 62 04/13/2022    ALT 21 04/13/2022    AST 23 04/13/2022     (H) 04/13/2022    K 4.7 04/13/2022     04/13/2022    CREATININE 0.73 04/13/2022    BUN 12 04/13/2022    CO2 26 04/13/2022    TSH 1.68 04/13/2022    LABA1C 5.4 04/13/2022     Lab Results   Component Value Date    LABALBU 4.4 04/13/2022     No results found for: IRON, TIBC, FERRITIN  Lab Results   Component Value Date    LDLCHOLESTEROL 118 04/13/2022     I agree with the assessment, plan and the diagnosis of    Diagnosis Orders   1. Hypercholesteremia  atorvastatin (LIPITOR) 10 MG tablet      2. Weight loss  Urine Drug Screen      3. Encounter for weight management  phentermine (ADIPEX-P) 37.5 MG capsule    Urine Drug [address] [facility]       . I agree with orders as documented by the resident. More than 25 minutes spent  in face to face encounter with the patient and more than half in counseling. Patient's questions were answered. Patient Voiced understanding to the counseling. Return in about 4 weeks (around 9/21/2022), or weight loss managment. (GC Modifier)-Dr. Vickey Altamirano MD

## 2022-08-26 ENCOUNTER — HOSPITAL ENCOUNTER (OUTPATIENT)
Age: 57
Setting detail: SPECIMEN
Discharge: HOME | End: 2022-08-26

## 2022-08-26 DIAGNOSIS — R63.4 WEIGHT LOSS: ICD-10-CM

## 2022-08-26 DIAGNOSIS — Z76.89 ENCOUNTER FOR WEIGHT MANAGEMENT: ICD-10-CM

## 2022-08-26 LAB
AMPHETAMINE SCREEN URINE: NEGATIVE
BARBITURATE SCREEN URINE: NEGATIVE
BENZODIAZEPINE SCREEN, URINE: NEGATIVE
CANNABINOID SCREEN URINE: NEGATIVE
COCAINE METABOLITE, URINE: NEGATIVE
FENTANYL URINE: NEGATIVE
METHADONE SCREEN, URINE: NEGATIVE
OPIATES, URINE: NEGATIVE
OXYCODONE SCREEN URINE: NEGATIVE
PHENCYCLIDINE, URINE: NEGATIVE
TEST INFORMATION: NORMAL

## 2022-09-01 NOTE — DISCHARGE SUMMARY
[x] [facility] &  Therapy  [address]  [phone]  F: [phone] [] [address]  [facility] [address]   [address]  P: [phone]  F: [phone] [] [address]  [address]  P: [phone]  F: [phone] [] [address]  P: [phone]  F: [phone] [] [address]  [address] [facility]   [address]   [place]: [phone]  F: [phone]      Physical Therapy Discharge Note    Date: 2022      Patient: Nay Whatley  : 1965  MRN: [id number]    Physician: Marisa Sanford CNP                                   Insurance: Dawit Amanda (Based on [employer];  / Based on [employer], $40 co-pay covered by secondary insurance- Medicaid)    Medical Diagnosis: Lumbar spondylosis (M47.816 [ICD-10-CM]); Other spondylosis with radiculopathy, lumbar region (M47.26 [ICD-10-CM]); Bilateral hip pain (M25.551,M25.552 [ICD-10-CM])              Rehab Codes:  M47.816, M47.26, M25.551,M25.552, M62.81, R26.89 M25.652  Onset Date: referral 22                                                   Next 's appt.: 22- pain management   Visit# / total visits: ; Progress note for Medicare patient due at visit 10th                                 Cancels/No Shows: 0/0  Date of initial visit: 22                Date of final visit: 8/15/22      Subjective:  Pain:  [x] Yes  [] No   Location: left hip                      Pain Rating: (0-10 scale) 0/10  Pain altered Tx:  [x] No  [] Yes  Action:  Comments: Reports mild stiffness upon waking, stretches relieved her pain. Reports that she has been walking a lot more with her son. Has been riding her bike for an hour at a time.  Stopped taking her gabipentin and muscle relaxants about a month ago. Used her external low back brace once during a long walk. Reports that her pain gets up to 2/10 at its highest over the last few weeks. Objective:  Test Measurements and function             STRENGTH     Left Right   L1-2 Hip Flex 5 5   Hip Abd 4 4   Hip ext 4+ 4   L3-4 Knee Ext 5 5   L4 Ankle DF 5 5   L5 EHL       S1 Plant. Flex 5 5         Lumbar ROM Left Right   Flexion WFL       Extension WFL*       Rotation   WFL WFL   Sidebend   WFL WFL   UE/LE         Hip flex    WFL WFL   Hip ER    63 66   Hip IR    42 45   Hip ext   [facility]         Functional Test: Modified Oswestry Low Back Pain Disability Questionnaire  Score: 38% functionally impaired        Assessment: Completed formal goal assessment as patient feels she can continue her exercises at home. She demonstrates significant improvement in mobility and strength, with only mild hip abduction weakness present. Transverse abdominis contraction improved, with patient more self-aware of the contraction needed for lumbar support. Less reliance on her external braces. Discussed continued need for strength in her hips and importance of continuing her HEP. She has appointment with pain management this week. Patient to be placed on hold until the end of the month, with instruction to contact with any concerns or increased pain warranting another treatment visit. Patient voices understanding. Goals MET NOT MET ON-  GOING Details   Date Addressed: 8/15/22           STG: To be met in 10 treatments            1. ? Pain: Decrease low back and leg pain levels to 4/10 with ADLs [x]  []  []      2. ? ROM: Increase lumbar AROM to [facility] without pain to reduce difficulty with ADLs []  [x]  []   Mild pain with extension, motion WFL    3. Patient to demonstrate active hip extension without lumbar compensations  [x]  []  []      4. ? Strength:  Increase LE MMT to at least 4+/5 throughout to ease functional limitations and mobility []  [x]  []  Mild hip abduction weakness bilaterally    5. Demonstrate improve dynamic standing posture with minimal forward head and rounded shoulders  []  [x]  [x]   Mild forward head and rounded shoulders    6. Independent with Home Exercise Programs [x]  []  []        []  []  []      Date Addressed: 8/15/22           LTG: To be met in 16 treatments           1. Improve score on assessment tool Modified Oswestry Low Back Pain Questionnaire from 76% impairment to less than 40% impairment  [x]  []  []  38% impairment     2. Reduce low back and leg pain levels to 2/10 or less with ADLs [x]  []  []   Pain only up to a 2/10 at its worst over the last few weeks    3. Patient to report the ability to go a week without use of external postural brace  []  [x]  [x]  Has used once in the last few weeks, when going on a long walk                      Patient goals: pain free          Treatment to Date:  [x] Therapeutic Exercise    [] Modalities:  [] Therapeutic Activity    [] Ultrasound  [] Electrical Stimulation  [] Gait Training     [] Massage       [] Lumbar/Cervical Traction  [] Neuromuscular Re-education [] Cold/hotpack [] Iontophoresis: 4 mg/mL  [x] Instruction in Home Exercise Program                     Dexamethasone Sodium  [x] Manual Therapy             Phosphate 40-80 mAmin  [] Aquatic Therapy                   [] Vasocompression/    [] Other:             Game Ready    Discharge Status:     [x] Pt recovered from conditions. Treatment goals were met. [] Pt received maximum benefit. No further therapy indicated at this time. [x] Pt to continue exercise/home instructions independently. [] Therapy interrupted due to:    [] Pt has 2 or more no shows/cancels, is discontinued per our policy. [] Pt has completed prescribed number of treatment sessions. [] Other:         Electronically signed by Nick Benites PT on 9/1/2022 at 1:23 PM      If you have any questions or concerns, please don't hesitate to call.   Thank you for your referral.

## 2022-09-06 ENCOUNTER — HOSPITAL ENCOUNTER (OUTPATIENT)
Dept: NUTRITION | Age: 57
Discharge: HOME | End: 2022-09-06
Payer: MEDICARE

## 2022-09-06 PROCEDURE — 97802 MEDICAL NUTRITION INDIV IN: CPT

## 2022-09-15 DIAGNOSIS — Z76.89 ENCOUNTER FOR WEIGHT MANAGEMENT: ICD-10-CM

## 2022-09-15 DIAGNOSIS — E78.00 HYPERCHOLESTEREMIA: ICD-10-CM

## 2022-09-15 RX ORDER — ATORVASTATIN CALCIUM 10 MG/1
TABLET, FILM COATED ORAL
Qty: 30 TABLET | Refills: 3 | Status: SHIPPED | OUTPATIENT
Start: 2022-09-15 | End: 2022-10-17

## 2022-09-15 RX ORDER — OMEPRAZOLE 10 MG/1
20 CAPSULE, DELAYED RELEASE ORAL DAILY
Qty: 30 CAPSULE | Refills: 3 | Status: SHIPPED | OUTPATIENT
Start: 2022-09-15 | End: 2022-10-15

## 2022-09-15 RX ORDER — OMEPRAZOLE 10 MG/1
CAPSULE, DELAYED RELEASE ORAL
Qty: 30 CAPSULE | Refills: 1 | OUTPATIENT
Start: 2022-09-15

## 2022-09-15 NOTE — TELEPHONE ENCOUNTER
Please address the medication refill and close the encounter. If I can be of assistance, please route to the applicable pool. Thank you.       Last visit: 8-24-22  Last Med refill: 7-12-22  Does patient have enough medication for 72 hours: No:     Next Visit Date:  Future Appointments   Date Time Provider Dilia Alcantar   9/28/2022 10:30 AM Finesse Thomas APRN - CNP [facility]   10/21/2022 10:30 AM Terrence Grant MD [address] [place] [address] Maintenance   Topic Date Due    Annual Wellness Visit (AWV)  Never done    Flu vaccine (1) Never done    COVID-19 Vaccine (1) 04/03/2023 (Originally 1965)    DTaP/Tdap/Td vaccine (1 - Tdap) 04/13/2023 (Originally 5/1/1984)    Shingles vaccine (1 of 2) 04/13/2023 (Originally 5/1/2015)    Lipids  04/13/2023    Depression Monitoring  04/13/2023    Breast cancer screen  05/03/2024    Colorectal Cancer Screen  05/03/2025    Hepatitis C screen  Completed    HIV screen  Completed    Hepatitis A vaccine  Aged Out    Hepatitis B vaccine  Aged Out    Hib vaccine  Aged Out    Meningococcal (ACWY) vaccine  Aged Out    Pneumococcal 0-64 years Vaccine  Aged Out       Hemoglobin A1C (%)   Date Value   04/13/2022 5.4             ( goal A1C is < 7)   No results found for: LABMICR  LDL Cholesterol (mg/dL)   Date Value   04/13/2022 118       (goal LDL is <100)   AST (U/L)   Date Value   04/13/2022 23     ALT (U/L)   Date Value   04/13/2022 21     BUN (mg/dL)   Date Value   04/13/2022 12     BP Readings from Last 3 Encounters:   08/24/22 120/78   07/06/22 115/76   06/13/22 118/84          (goal 120/80)    All Future Testing planned in [facility]              Patient Active Problem List:     Chronic bilateral low back pain with left-sided sciatica     Constipation     Numbness and tingling in both hands     Chronic bilateral low back pain with bilateral sciatica     Gastroesophageal reflux disease without esophagitis

## 2022-09-15 NOTE — TELEPHONE ENCOUNTER
Please address the medication refill and close the encounter. If I can be of assistance, please route to the applicable pool. Thank you.     Last visit: 8-24-22  Last Med refill: 8-24-22  Does patient have enough medication for 72 hours: No:     Next Visit Date:  Future Appointments   Date Time Provider Dilia Alcantar   9/28/2022 10:30 AM Briseida Rosario APRN - CNP [facility]   10/21/2022 10:30 AM Terrence Simon MD [address] [place] [address] Maintenance   Topic Date Due    Annual Wellness Visit (AWV)  Never done    Flu vaccine (1) Never done    COVID-19 Vaccine (1) 04/03/2023 (Originally 1965)    DTaP/Tdap/Td vaccine (1 - Tdap) 04/13/2023 (Originally 5/1/1984)    Shingles vaccine (1 of 2) 04/13/2023 (Originally 5/1/2015)    Lipids  04/13/2023    Depression Monitoring  04/13/2023    Breast cancer screen  05/03/2024    Colorectal Cancer Screen  05/03/2025    Hepatitis C screen  Completed    HIV screen  Completed    Hepatitis A vaccine  Aged Out    Hepatitis B vaccine  Aged Out    Hib vaccine  Aged Out    Meningococcal (ACWY) vaccine  Aged Out    Pneumococcal 0-64 years Vaccine  Aged Out       Hemoglobin A1C (%)   Date Value   04/13/2022 5.4             ( goal A1C is < 7)   No results found for: LABMICR  LDL Cholesterol (mg/dL)   Date Value   04/13/2022 118       (goal LDL is <100)   AST (U/L)   Date Value   04/13/2022 23     ALT (U/L)   Date Value   04/13/2022 21     BUN (mg/dL)   Date Value   04/13/2022 12     BP Readings from Last 3 Encounters:   08/24/22 120/78   07/06/22 115/76   06/13/22 118/84          (goal 120/80)    All Future Testing planned in [facility]              Patient Active Problem List:     Chronic bilateral low back pain with left-sided sciatica     Constipation     Numbness and tingling in both hands     Chronic bilateral low back pain with bilateral sciatica     Gastroesophageal reflux disease without esophagitis

## 2022-09-15 NOTE — TELEPHONE ENCOUNTER
Last visit: 8/24/22  Last Med refill: 8/24/22  Does patient have enough medication for 72 hours: Yes    Next Visit Date:  Future Appointments   Date Time Provider Dilia Alcantar   9/28/2022 10:30 AM KOBY Bravo - CNP [facility]   10/21/2022 10:30 AM Terrence Bojorquez MD [address] [place] [address]   Topic Date Due    Annual Wellness Visit (AWV)  Never done    Flu vaccine (1) Never done    COVID-19 Vaccine (1) 04/03/2023 (Originally 1965)    DTaP/Tdap/Td vaccine (1 - Tdap) 04/13/2023 (Originally 5/1/1984)    Shingles vaccine (1 of 2) 04/13/2023 (Originally 5/1/2015)    Lipids  04/13/2023    Depression Monitoring  04/13/2023    Breast cancer screen  05/03/2024    Colorectal Cancer Screen  05/03/2025    Hepatitis C screen  Completed    HIV screen  Completed    Hepatitis A vaccine  Aged Out    Hepatitis B vaccine  Aged Out    Hib vaccine  Aged Out    Meningococcal (ACWY) vaccine  Aged Out    Pneumococcal 0-64 years Vaccine  Aged Out       Hemoglobin A1C (%)   Date Value   04/13/2022 5.4             ( goal A1C is < 7)   No results found for: LABMICR  LDL Cholesterol (mg/dL)   Date Value   04/13/2022 118       (goal LDL is <100)   AST (U/L)   Date Value   04/13/2022 23     ALT (U/L)   Date Value   04/13/2022 21     BUN (mg/dL)   Date Value   04/13/2022 12     BP Readings from Last 3 Encounters:   08/24/22 120/78   07/06/22 115/76   06/13/22 118/84          (goal 120/80)    All Future Testing planned in [facility]              Patient Active Problem List:     Chronic bilateral low back pain with left-sided sciatica     Constipation     Numbness and tingling in both hands     Chronic bilateral low back pain with bilateral sciatica     Gastroesophageal reflux disease without esophagitis

## 2022-09-19 RX ORDER — PHENTERMINE HYDROCHLORIDE 37.5 MG/1
37.5 CAPSULE ORAL EVERY MORNING
Qty: 30 CAPSULE | Refills: 0 | Status: SHIPPED | OUTPATIENT
Start: 2022-09-19 | End: 2022-09-21

## 2022-09-20 DIAGNOSIS — Z76.89 ENCOUNTER FOR WEIGHT MANAGEMENT: ICD-10-CM

## 2022-09-20 RX ORDER — PHENTERMINE HYDROCHLORIDE 37.5 MG/1
37.5 CAPSULE ORAL EVERY MORNING
Qty: 30 CAPSULE | Refills: 0 | OUTPATIENT
Start: 2022-09-20 | End: 2022-10-20

## 2022-09-20 NOTE — TELEPHONE ENCOUNTER
Last visit: 8/24/22  Last Med refill: 9/19/22  Does patient have enough medication for 72 hours: Yes- writer unable to refuse.     Next Visit Date:  Future Appointments   Date Time Provider Dilia Alcantar   9/28/2022 10:30 AM KOBY Lujan - CNP [facility]   10/21/2022 10:30 AM Terrence Duran MD [address] [place] [address]   Topic Date Due    Annual Wellness Visit (AWV)  Never done    Flu vaccine (1) Never done    COVID-19 Vaccine (1) 04/03/2023 (Originally 1965)    DTaP/Tdap/Td vaccine (1 - Tdap) 04/13/2023 (Originally 5/1/1984)    Shingles vaccine (1 of 2) 04/13/2023 (Originally 5/1/2015)    Lipids  04/13/2023    Depression Monitoring  04/13/2023    Breast cancer screen  05/03/2024    Colorectal Cancer Screen  05/03/2025    Hepatitis C screen  Completed    HIV screen  Completed    Hepatitis A vaccine  Aged Out    Hepatitis B vaccine  Aged Out    Hib vaccine  Aged Out    Meningococcal (ACWY) vaccine  Aged Out    Pneumococcal 0-64 years Vaccine  Aged Out       Hemoglobin A1C (%)   Date Value   04/13/2022 5.4             ( goal A1C is < 7)   No results found for: LABMICR  LDL Cholesterol (mg/dL)   Date Value   04/13/2022 118       (goal LDL is <100)   AST (U/L)   Date Value   04/13/2022 23     ALT (U/L)   Date Value   04/13/2022 21     BUN (mg/dL)   Date Value   04/13/2022 12     BP Readings from Last 3 Encounters:   08/24/22 120/78   07/06/22 115/76   06/13/22 118/84          (goal 120/80)    All Future Testing planned in [facility]              Patient Active Problem List:     Chronic bilateral low back pain with left-sided sciatica     Constipation     Numbness and tingling in both hands     Chronic bilateral low back pain with bilateral sciatica     Gastroesophageal reflux disease without esophagitis

## 2022-09-21 RX ORDER — PHENTERMINE HYDROCHLORIDE 37.5 MG/1
CAPSULE ORAL
Qty: 30 CAPSULE | Refills: 0 | Status: SHIPPED | OUTPATIENT
Start: 2022-09-21 | End: 2022-10-17 | Stop reason: SDUPTHER

## 2022-09-28 ENCOUNTER — OFFICE VISIT (OUTPATIENT)
Dept: NEUROSURGERY | Age: 57
End: 2022-09-28
Payer: MEDICARE

## 2022-09-28 VITALS
HEIGHT: 62 IN | SYSTOLIC BLOOD PRESSURE: 110 MMHG | BODY MASS INDEX: 31.1 KG/M2 | DIASTOLIC BLOOD PRESSURE: 74 MMHG | OXYGEN SATURATION: 96 % | WEIGHT: 169 LBS | TEMPERATURE: 97.3 F | HEART RATE: 69 BPM

## 2022-09-28 DIAGNOSIS — M47.816 LUMBAR SPONDYLOSIS: ICD-10-CM

## 2022-09-28 DIAGNOSIS — M25.552 LEFT HIP PAIN: Primary | ICD-10-CM

## 2022-09-28 PROCEDURE — 99214 OFFICE O/P EST MOD 30 MIN: CPT | Performed by: NURSE PRACTITIONER

## 2022-09-28 NOTE — PROGRESS NOTES
[address]  [facility] [address] [facility] [address]  Dept: [phone]    Patient:  Tania Cartagena  YOB: 1965  Date: 6/13/22    The patient is a 62 y.o. female who presents today for consult of the following problems:     Chief Complaint   Patient presents with    Follow-up         HPI:     Tania Cartagena is a 62 y.o. female presents for follow-up of low back pain. Pain today 8/10, located to the lower back, described as a \"ripping, pulling\" pain. Denies any numbness tingling to lower extremities presently. Any increase in activity tends to worsen the pain. Tries to utilize stationary bike at home, also follows with chiropractor. Has been completing home stretching exercises for several months without significant improvement. Utilizing Flexeril, gabapentin as well as Aleve to manage the pain. No saddle anesthesia, changes to bowels or bladder. Does have associated bilateral hip pain. Pain currently the worst to right hip extending into left groin, as well as over left SI joint. Has had some interval improvement with physical therapy measures. History:     History reviewed. No pertinent past medical history.   Past Surgical History:   Procedure Laterality Date    HYSTERECTOMY (CERVIX STATUS UNKNOWN)      OVARY REMOVAL       Family History   Problem Relation Age of Onset    Asthma Mother     Other Mother         COPD    Cancer Father         Lung    Substance Abuse Sister         Clean for a year now    Obesity Sister     No Known Problems Brother     No Known Problems Brother      Current Outpatient Medications on File Prior to Visit   Medication Sig Dispense Refill    phentermine 37.5 MG capsule TAKE 1 CAPSULE BY MOUTH EVERY DAY IN THE MORNING 30 capsule 0    omeprazole (PRILOSEC) 10 MG delayed release capsule Take 2 capsules by mouth daily 30 capsule 3    Handicap Placard MISC by Does not apply route Will be valid for 5 year 1 each 0    atorvastatin (LIPITOR) 10 MG tablet TAKE 1 TABLET BY MOUTH EVERY DAY (Patient not taking: Reported on 2022) 30 tablet 3     No current facility-administered medications on file prior to visit. Social History     Tobacco Use    Smoking status: Former     Packs/day: 1.00     Years: 23.00     Pack years: 23.00     Types: Cigarettes     Quit date: 2000     Years since quittin.7    Smokeless tobacco: Never   Vaping Use    Vaping Use: Former    Substances: Nicotine    Devices: Pre-filled or refillable cartridge   Substance Use Topics    Alcohol use: Yes     Comment: socially    Drug use: Not Currently     Types: Marijuana (Weed)       Allergies   Allergen Reactions    Penicillins      Nauseous        Review of Systems  Constitutional: Negative for activity change and appetite change. HENT: Negative for ear pain and facial swelling. Eyes: Negative for discharge and itching. Respiratory: Negative for choking and chest tightness. Cardiovascular: Negative for chest pain and leg swelling. Gastrointestinal: Negative for nausea and abdominal pain. Endocrine: Negative for cold intolerance and heat intolerance. Genitourinary: Negative for frequency and flank pain. Musculoskeletal: Negative for myalgias and joint swelling. Skin: Negative for rash and wound. Allergic/Immunologic: Negative for environmental allergies and food allergies. Hematological: Negative for adenopathy. Does not bruise/bleed easily. Psychiatric/Behavioral: Negative for self-injury. The patient is not nervous/anxious. Physical Exam:      /74   Pulse 69   Temp 97.3 °F (36.3 °C) (Temporal)   Ht 5' 2\" (1.575 m)   Wt 169 lb (76.7 kg)   SpO2 96%   BMI 30.91 kg/m²   Estimated body mass index is 30.91 kg/m² as calculated from the following:    Height as of this encounter: 5' 2\" (1.575 m).     Weight as of this encounter: 169 lb (76.7 kg).    General:  Christen Martinez is a 62y.o. year old female who appears her stated age. HEENT: Normocephalic atraumatic. Neck supple. Chest: regular rate; pulses equal  Abdomen: Soft nontender nondistended. Ext: DP and PT pulses 2+, good cap refill  Neuro    Mentation  Appropriate affect  Registration intact  Orientation intact  Judgement intact to situation    Cranial Nerves:   Pupils equal and reactive to light  Extraocular motion intact  Face and shrug symmetric  Tongue midline  No dysarthria  v1-3 sensation symmetric, masseter tone symmetric  Hearing symmetric    Sensation: Intact on exam today    Motor  L deltoid 5/5; R deltoid 5/5  L biceps 5/5; R biceps 5/5  L triceps 5/5; R triceps 5/5  L wrist extension 5/5; R wrist extension 5/5  L intrinsics 5/5; R intrinsics 5/5     L iliopsoas 4+/5 , R iliopsoas 5/5  L quadriceps 5/5; R quadriceps 5/5  L Dorsiflexion 5/5; R dorsiflexion 5/5  L Plantarflexion 5/5; R plantarflexion 5/5  L EHL 5/5; R EHL 5/5    Reflexes  L Brachioradialis 2+/4; R brachioradialis 2+/4  L Biceps 2+/4; R Biceps 2+/4  L Triceps 2+/4; R Triceps 2+/4  L Patellar 2+/4: R Patellar 2+/4  L Achilles 2+/4; R Achilles 2+/4    hoffmans L: neg  hoffmans R: neg  Clonus L: neg  Clonus R: neg  Babinski L: neg  Babinski R: neg    +TTP left SI  + Nevin Redhead left side    Studies Review:     MRI lumbar 6/23/2022:  FINDINGS:   BONES/ALIGNMENT: Vertebral body heights are maintained. There is   age-appropriate bone marrow signal.  There is multilevel degenerative disc   disease with loss of disc signal.  There is no significant spondylolisthesis. SPINAL CORD: Conus is normal in caliber and signal and terminates at the L2   level. The cauda equina is unremarkable. SOFT TISSUES: Posterior paraspinal soft tissues are unremarkable. The   visualized abdominal structures are unremarkable. L1-L2: There is a circumferential disc bulge. There is no canal stenosis. There is mild foraminal narrowing. L2-L3: There is a circumferential disc bulge. There is no canal stenosis. There is mild foraminal narrowing. L3-L4: There is a circumferential disc bulge with facet and ligamentous   hypertrophy. There is canal stenosis measuring 7 mm in AP dimension. There   is mild right foraminal narrowing and no significant left foraminal narrowing. L4-L5: There is a circumferential disc bulge with facet and ligamentous   hypertrophy. There is canal stenosis measuring 6 mm in AP dimension. There   is no significant foraminal narrowing. L5-S1: There is a circumferential disc bulge. There is no canal stenosis or   right foraminal narrowing. There is mild left foraminal narrowing. X-ray left hip 6/13/2022:  FINDINGS:   Mild osteophyte spurring at the margins of the left hip joint space. Left   hip joint space well maintained. Mild degenerative changes of the pubic   symphysis. Pelvic phleboliths. Mild stool burden. Mild degenerative   changes in the lower lumbar spine. Left femoral head projects over the left   acetabulum without clear evidence for acute fracture, dislocation or femoral   head flattening. Physical therapy notes reviewed    Assessment and Plan:      1. Left hip pain    2. Lumbar spondylosis          Plan: Patients predominant discomfort today is located over the left hip with radiation to left groin and over left SI joint. Would like referral for evaluation by orthopedic specialist, provided. Recommend establishing with pain management as previously advised for consideration of diagnostic injections to help localize etiology of symptoms. We will see the patient back in 10 to 12 weeks for reevaluation. Continue physical therapy measures. Followup: Return in about 3 months (around 12/28/2022), or if symptoms worsen or fail to improve. Prescriptions Ordered:  No orders of the defined types were placed in this encounter.      Orders Placed:  Orders Placed This Encounter   Procedures    [address], [place], Orthopedic Surgery, Kiara Galvez     Referral Priority:   Routine     Referral Type:   Eval and Treat     Referral Reason:   Specialty Services Required     Referred to Provider:   Rohit Montiel PA-C     Requested Specialty:   Physician Assistant     Number of Visits Requested:   1        Electronically signed by KOBY Horn CNP on 9/28/2022 at 3:47 PM    Please note that this chart was generated using voice recognition Dragon dictation software. Although every effort was made to ensure the accuracy of this automated transcription, some errors in transcription may have occurred.

## 2022-10-03 ENCOUNTER — OFFICE VISIT (OUTPATIENT)
Dept: ORTHOPEDIC SURGERY | Age: 57
End: 2022-10-03
Payer: MEDICARE

## 2022-10-03 VITALS — WEIGHT: 168 LBS | BODY MASS INDEX: 30.91 KG/M2 | HEIGHT: 62 IN

## 2022-10-03 DIAGNOSIS — M16.12 ARTHRITIS OF LEFT HIP: Primary | ICD-10-CM

## 2022-10-03 PROCEDURE — 99203 OFFICE O/P NEW LOW 30 MIN: CPT | Performed by: PHYSICIAN ASSISTANT

## 2022-10-03 NOTE — PROGRESS NOTES
[address] [facility]  [facility] [facility] [address]  Dept: [phone]    Ambulatory Orthopedic Consult      CHIEF COMPLAINT:    Chief Complaint   Patient presents with    New Patient     new patient,  Left hip pain, no surg, xrays & MRI in [facility], ref'd by Patrica More, scheduled w/pt   screened green       HISTORY OF PRESENT ILLNESS:      The patient is a 62 y.o. female who is being seen at the request of  Gume Edge MD for consultation and evaluation of  chronic left hip pain. Arturo Barrera presents with a 1 years history of pain in the left hip. The pain does radiate into the thigh and into the groin. The pain is   made worse with weightbearing. The pain is  worse at night. The pain is not worse when laying on the affected side. The pain is made better with sitting/rest. She notes she has tried chiropractic treatment, left lateral hip injections and OTC anti-inflammatories - all of which have not helped her left hip discomfort. Patient's current BMI is 30.73 (168 pounds). She is a nondiabetic. And a non-smoker. Past Medical History:    History reviewed. No pertinent past medical history. Past Surgical History:    Past Surgical History:   Procedure Laterality Date    HYSTERECTOMY (CERVIX STATUS UNKNOWN)      OVARY REMOVAL         Current Medications:   Current Outpatient Medications   Medication Sig Dispense Refill    phentermine 37.5 MG capsule TAKE 1 CAPSULE BY MOUTH EVERY DAY IN THE MORNING 30 capsule 0    omeprazole (PRILOSEC) 10 MG delayed release capsule Take 2 capsules by mouth daily 30 capsule 3    atorvastatin (LIPITOR) 10 MG tablet TAKE 1 TABLET BY MOUTH EVERY DAY (Patient not taking: Reported on 9/28/2022) 30 tablet 3    Handicap Placard MISC by Does not apply route Will be valid for 5 year 1 each 0     No current facility-administered medications for this visit.        Allergies: Penicillins    Social History:   Social History     Socioeconomic History    Marital status: Single     Spouse name: Not on file    Number of children: Not on file    Years of education: Not on file    Highest education level: Not on file   Occupational History    Not on file   Tobacco Use    Smoking status: Former     Packs/day: 1.00     Years: 23.00     Pack years: 23.00     Types: Cigarettes     Quit date: 2000     Years since quittin.7    Smokeless tobacco: Never   Vaping Use    Vaping Use: Former    Substances: Nicotine    Devices: Pre-filled or refillable cartridge   Substance and Sexual Activity    Alcohol use: Yes     Comment: socially    Drug use: Not Currently     Types: Marijuana Lillie Pleas)    Sexual activity: Not Currently   Other Topics Concern    Not on file   Social History Narrative    Not on file     Social Determinants of Health     Financial Resource Strain: Low Risk     Difficulty of Paying Living Expenses: Not hard at all   Food Insecurity: No Food Insecurity    Worried About Running Out of Food in the Last Year: Never true    Ran Out of Food in the Last Year: Never true   Transportation Needs: Not on file   Physical Activity: Not on file   Stress: Not on file   Social Connections: Not on file   Intimate Partner Violence: Not on file   Housing Stability: Not on file       Family History:  Family History   Problem Relation Age of Onset    Asthma Mother     Other Mother         COPD    Cancer Father         Lung    Substance Abuse Sister         Clean for a year now    Obesity Sister     No Known Problems Brother     No Known Problems Brother          REVIEW OF SYSTEMS:  Review of Systems   Constitutional:  Negative for activity change and fever. HENT:  Negative for sneezing. Respiratory:  Negative for cough and shortness of breath. Cardiovascular:  Negative for chest pain. Gastrointestinal:  Negative for vomiting. Musculoskeletal:  Positive for arthralgias (left hip).  Negative for joint swelling and myalgias. Skin:  Negative for color change. Neurological:  Negative for weakness and numbness. Psychiatric/Behavioral:  Positive for sleep disturbance (d/t left hip pain). PHYSICAL EXAM:  Ht 5' 2\" (1.575 m)   Wt 168 lb (76.2 kg)   BMI 30.73 kg/m²  Body mass index is 30.73 kg/m². Physical Exam  Gen: alert and oriented to person and place. Psych:  Appropriate affect; Appropriate knowledge base; Appropriate mood; No hallucinations; Head: normocephalic, atraumatic   Chest: symmetric chest excursion; nonlabored respiratory effort. Pelvis: stable; no obvious pelvis deformity  Ortho Exam  Extremity:  Patient ambulates independently with mild shortening weightbearing phase and antalgic gait to the Left lower extremity. There is no erythema, warmth, skin lesions, signs of infection. Positive hip logroll and Stinchfield test is noted. Patient has full range of motion of the Left knee and ankle. Motor, sensory, and vascular examination to the Left lower extremity is intact without focal deficits. Patient has full range of motion of the lumbosacral spine. Radiology:   XR HIP 2-3 VW W PELVIS LEFT    Result Date: 10/3/2022  History:   Left hip pain Findings:   Low AP pelvis, AP Left hip, frog leg lateral xrays of the Left hip done in the office today shows moderate joint space narrowing, osteophytosis, joint line sclerosis to the Left hip. No evidence of fracture, subluxation, dislocation, radioopaque foreign body or radioopaque tumor is noted. Impression:  Moderate degenerative changes Left hip as described above. ASSESSMENT:     1. Arthritis of left hip         PLAN:       Today in office we discussed etiology and natural history of chronic left hip pain due to osteoarthritis. I personally interpreted and reviewed the patient's x-rays from today revealing moderate approaching severe degenerative changes within the left hip.      The treatment options may include activity modification, oral anti-inflammatories, bracing, injections, advanced imaging, physical therapy and/or surgical intervention. The patient would like to proceed with:  1.  Scheduling a left total hip arthroplasty with the anterior approach to be completed by Dr. Sree Trujillo DO. I spoke with the patient about the benefits and risks associated with a left total hip arthroplasty with the anterior approach, answered her questions and spoke to her about post-operative care and rehabilitation. The patient signed the surgical consent form in office today. We discussed that our surgery scheduler will call her in regards to pre-operative clearance/testing, surgery date and post-operative follow up. The patient will follow up 2 weeks post-operatively. We discussed that the patient should call us with any questions or concerns. The patient voiced her understanding. Return for 2 weeks post-op. Total Time: 40 min      No orders of the defined types were placed in this encounter. No orders of the defined types were placed in this encounter. This note is created with the assistance of a speech recognition program.  While intending to generate a document that actually reflects the content of the visit, the document can still have some errors including those of syntax and sound a like substitutions which may escape proof reading. In such instances, actual meaning can be extrapolated by contextual diversion.      Electronically signed by Maranda Nino PA-C on 10/5/2022 at 6:58 PM

## 2022-10-05 ENCOUNTER — TELEPHONE (OUTPATIENT)
Dept: ORTHOPEDIC SURGERY | Age: 57
End: 2022-10-05

## 2022-10-05 DIAGNOSIS — Z01.818 PRE-OP TESTING: Primary | ICD-10-CM

## 2022-10-05 ASSESSMENT — ENCOUNTER SYMPTOMS
VOMITING: 0
SHORTNESS OF BREATH: 0
COUGH: 0
COLOR CHANGE: 0

## 2022-10-07 ENCOUNTER — HOSPITAL ENCOUNTER (OUTPATIENT)
Age: 57
Discharge: HOME | End: 2022-10-07
Payer: MEDICARE

## 2022-10-07 ENCOUNTER — HOSPITAL ENCOUNTER (OUTPATIENT)
Age: 57
Discharge: HOME | End: 2022-10-09
Payer: MEDICARE

## 2022-10-07 ENCOUNTER — HOSPITAL ENCOUNTER (OUTPATIENT)
Dept: GENERAL RADIOLOGY | Age: 57
Discharge: HOME | End: 2022-10-09
Payer: MEDICARE

## 2022-10-07 DIAGNOSIS — Z01.818 PRE-OP TESTING: ICD-10-CM

## 2022-10-07 LAB
ALBUMIN SERPL-MCNC: 4.1 G/DL (ref 3.5–5.2)
ALBUMIN/GLOBULIN RATIO: 1.6 (ref 1–2.5)
ALP BLD-CCNC: 85 U/L (ref 35–104)
ALT SERPL-CCNC: 14 U/L (ref 5–33)
ANION GAP SERPL CALCULATED.3IONS-SCNC: 10 MMOL/L (ref 9–17)
AST SERPL-CCNC: 19 U/L
BACTERIA: ABNORMAL
BILIRUB SERPL-MCNC: 0.5 MG/DL (ref 0.3–1.2)
BILIRUBIN URINE: NEGATIVE
BUN BLDV-MCNC: 17 MG/DL (ref 6–20)
CALCIUM SERPL-MCNC: 9.3 MG/DL (ref 8.6–10.4)
CASTS UA: ABNORMAL /LPF (ref 0–8)
CHLORIDE BLD-SCNC: 101 MMOL/L (ref 98–107)
CO2: 27 MMOL/L (ref 20–31)
COLOR: YELLOW
CREAT SERPL-MCNC: 0.67 MG/DL (ref 0.5–0.9)
EPITHELIAL CELLS UA: ABNORMAL /HPF (ref 0–5)
ESTIMATED AVERAGE GLUCOSE: 103 MG/DL
GFR SERPL CREATININE-BSD FRML MDRD: >60 ML/MIN/1.73M2
GLUCOSE BLD-MCNC: 84 MG/DL (ref 70–99)
GLUCOSE URINE: NEGATIVE
HBA1C MFR BLD: 5.2 % (ref 4–6)
HCT VFR BLD CALC: 42.7 % (ref 36.3–47.1)
HEMOGLOBIN: 13.9 G/DL (ref 11.9–15.1)
KETONES, URINE: NEGATIVE
LEUKOCYTE ESTERASE, URINE: ABNORMAL
MCH RBC QN AUTO: 28.8 PG (ref 25.2–33.5)
MCHC RBC AUTO-ENTMCNC: 32.6 G/DL (ref 28.4–34.8)
MCV RBC AUTO: 88.4 FL (ref 82.6–102.9)
NITRITE, URINE: POSITIVE
NRBC AUTOMATED: 0 PER 100 WBC
PDW BLD-RTO: 13.5 % (ref 11.8–14.4)
PH UA: 6 (ref 5–8)
PLATELET # BLD: 216 K/UL (ref 138–453)
PMV BLD AUTO: 9.3 FL (ref 8.1–13.5)
POTASSIUM SERPL-SCNC: 4.1 MMOL/L (ref 3.7–5.3)
PROTEIN UA: NEGATIVE
RBC # BLD: 4.83 M/UL (ref 3.95–5.11)
RBC UA: ABNORMAL /HPF (ref 0–4)
SODIUM BLD-SCNC: 138 MMOL/L (ref 135–144)
SPECIFIC GRAVITY UA: 1.02 (ref 1–1.03)
TOTAL PROTEIN: 6.7 G/DL (ref 6.4–8.3)
TURBIDITY: CLEAR
URINE HGB: NEGATIVE
UROBILINOGEN, URINE: NORMAL
WBC # BLD: 6.4 K/UL (ref 3.5–11.3)
WBC UA: ABNORMAL /HPF (ref 0–5)

## 2022-10-07 PROCEDURE — 85027 COMPLETE CBC AUTOMATED: CPT

## 2022-10-07 PROCEDURE — 87641 MR-STAPH DNA AMP PROBE: CPT

## 2022-10-07 PROCEDURE — 80053 COMPREHEN METABOLIC PANEL: CPT

## 2022-10-07 PROCEDURE — 93005 ELECTROCARDIOGRAM TRACING: CPT | Performed by: ORTHOPAEDIC SURGERY

## 2022-10-07 PROCEDURE — 81001 URINALYSIS AUTO W/SCOPE: CPT

## 2022-10-07 PROCEDURE — 36415 COLL VENOUS BLD VENIPUNCTURE: CPT

## 2022-10-07 PROCEDURE — 83036 HEMOGLOBIN GLYCOSYLATED A1C: CPT

## 2022-10-07 PROCEDURE — 71046 X-RAY EXAM CHEST 2 VIEWS: CPT

## 2022-10-08 LAB
MRSA, DNA, NASAL: NEGATIVE
SPECIMEN DESCRIPTION: NORMAL

## 2022-10-10 LAB
EKG ATRIAL RATE: 70 BPM
EKG P AXIS: 73 DEGREES
EKG P-R INTERVAL: 132 MS
EKG Q-T INTERVAL: 408 MS
EKG QRS DURATION: 80 MS
EKG QTC CALCULATION (BAZETT): 440 MS
EKG R AXIS: 83 DEGREES
EKG T AXIS: 5 DEGREES
EKG VENTRICULAR RATE: 70 BPM

## 2022-10-10 PROCEDURE — 93010 ELECTROCARDIOGRAM REPORT: CPT | Performed by: INTERNAL MEDICINE

## 2022-10-14 NOTE — TELEPHONE ENCOUNTER
Please address the medication refill and close the encounter. If I can be of assistance, please route to the applicable pool. Thank you.       Last visit: 8-24-22  Last Med refill: 9-15-22  Does patient have enough medication for 72 hours: No:     Next Visit Date:  Future Appointments   Date Time Provider Dilia Alcantar   10[facility]17[facility]2022 10:00 [facility]   10/20/2022  9:30 AM ST[facility] PAT RM 2 [facility]   11/14/2022 10:30 AM Beronica MONCADAIA [facility]   12/27/2022 11:00 AM Bekah Palacio, APRN - CNP [facility] [address] Maintenance   Topic Date Due    Annual Wellness Visit (AWV)  Never done    Flu vaccine (1) Never done    COVID-19 Vaccine (1) 04/03/2023 (Originally 1965)    DTaP/Tdap/Td vaccine (1 - Tdap) 04/13/2023 (Originally 5/1/1984)    Shingles vaccine (1 of 2) 04/13/2023 (Originally 5/1/2015)    Lipids  04/13/2023    Depression Monitoring  04/13/2023    Breast cancer screen  05/03/2024    Colorectal Cancer Screen  05/03/2025    Hepatitis C screen  Completed    HIV screen  Completed    Hepatitis A vaccine  Aged Out    Hib vaccine  Aged Out    Meningococcal (ACWY) vaccine  Aged Out    Pneumococcal 0-64 years Vaccine  Aged Out       Hemoglobin A1C (%)   Date Value   10/07/2022 5.2   04/13/2022 5.4             ( goal A1C is < 7)   No results found for: LABMICR  LDL Cholesterol (mg/dL)   Date Value   04/13/2022 118       (goal LDL is <100)   AST (U/L)   Date Value   10/07/2022 19     ALT (U/L)   Date Value   10/07/2022 14     BUN (mg/dL)   Date Value   10/07/2022 17     BP Readings from Last 3 Encounters:   09/28/22 110/74   08/24/22 120/78   07/06/22 115/76          (goal 120/80)    All Future Testing planned in [facility]  Lab Frequency Next Occurrence   EKG 12 Lead Once 10/20/2022               Patient Active Problem List:     Chronic bilateral low back pain with left-sided sciatica     Constipation     Numbness and tingling in both hands Chronic bilateral low back pain with bilateral sciatica     Gastroesophageal reflux disease without esophagitis

## 2022-10-15 RX ORDER — OMEPRAZOLE 20 MG/1
CAPSULE, DELAYED RELEASE ORAL
Qty: 30 CAPSULE | Refills: 3 | Status: SHIPPED | OUTPATIENT
Start: 2022-10-15 | End: 2022-10-17 | Stop reason: SDUPTHER

## 2022-10-17 ENCOUNTER — OFFICE VISIT (OUTPATIENT)
Dept: FAMILY MEDICINE CLINIC | Age: 57
End: 2022-10-17
Payer: MEDICARE

## 2022-10-17 VITALS
DIASTOLIC BLOOD PRESSURE: 78 MMHG | SYSTOLIC BLOOD PRESSURE: 121 MMHG | WEIGHT: 164 LBS | HEART RATE: 75 BPM | BODY MASS INDEX: 30 KG/M2

## 2022-10-17 DIAGNOSIS — Z76.89 ENCOUNTER FOR WEIGHT MANAGEMENT: ICD-10-CM

## 2022-10-17 DIAGNOSIS — E66.09 CLASS 1 OBESITY DUE TO EXCESS CALORIES WITHOUT SERIOUS COMORBIDITY WITH BODY MASS INDEX (BMI) OF 30.0 TO 30.9 IN ADULT: ICD-10-CM

## 2022-10-17 DIAGNOSIS — K21.9 GASTROESOPHAGEAL REFLUX DISEASE WITHOUT ESOPHAGITIS: ICD-10-CM

## 2022-10-17 DIAGNOSIS — Z01.818 PREOPERATIVE CLEARANCE: ICD-10-CM

## 2022-10-17 DIAGNOSIS — E78.2 MIXED HYPERLIPIDEMIA: Primary | ICD-10-CM

## 2022-10-17 PROBLEM — E78.00 HYPERCHOLESTEREMIA: Status: ACTIVE | Noted: 2022-10-17

## 2022-10-17 PROBLEM — E66.811 CLASS 1 OBESITY DUE TO EXCESS CALORIES WITHOUT SERIOUS COMORBIDITY WITH BODY MASS INDEX (BMI) OF 30.0 TO 30.9 IN ADULT: Status: ACTIVE | Noted: 2022-10-17

## 2022-10-17 PROCEDURE — 99213 OFFICE O/P EST LOW 20 MIN: CPT | Performed by: STUDENT IN AN ORGANIZED HEALTH CARE EDUCATION/TRAINING PROGRAM

## 2022-10-17 RX ORDER — ATORVASTATIN CALCIUM 20 MG/1
TABLET, FILM COATED ORAL
Qty: 30 TABLET | Refills: 3 | Status: SHIPPED | OUTPATIENT
Start: 2022-10-17

## 2022-10-17 RX ORDER — OMEPRAZOLE 20 MG/1
CAPSULE, DELAYED RELEASE ORAL
Qty: 30 CAPSULE | Refills: 3 | Status: SHIPPED | OUTPATIENT
Start: 2022-10-17

## 2022-10-17 RX ORDER — PHENTERMINE HYDROCHLORIDE 37.5 MG/1
CAPSULE ORAL
Qty: 30 CAPSULE | Refills: 0 | Status: SHIPPED | OUTPATIENT
Start: 2022-10-17 | End: 2022-11-17

## 2022-10-17 ASSESSMENT — ENCOUNTER SYMPTOMS: SHORTNESS OF BREATH: 0

## 2022-10-17 NOTE — PROGRESS NOTES
Subjective:    Naatly Ng is a 62 y.o. female with  has no past medical history on file. Family History   Problem Relation Age of Onset    Asthma Mother     Other Mother         COPD    Cancer Father         Lung    Substance Abuse Sister         Clean for a year now    Obesity Sister     No Known Problems Brother     No Known Problems Brother        Presented tot office today for:  Chief Complaint   Patient presents with    Follow-up     Follow up    Surgical Clearance     Ortho surgery clearance     Medication Refill     Medication refill       HPI this is a 42-year-old female with a past medical history of sleeve gastrectomy, bilateral salpingo-oophorectomy and obesity here for medical clearance for a left hip arthroplasty scheduled on November 1 with orthopedic surgery Dr. Becki Ambrose. RCRI - 0 points - low risk    Patient would like refill on phentermine  This was initiated on 08/24/2022 by PCP  Urine drug screen at that time was negative  This would be third month on phentermine  Has lost 15 pounds in the last 2 months  She is tolerating it well no side effects    Review of Systems   Constitutional:  Negative for fever. Respiratory:  Negative for shortness of breath. Cardiovascular:  Negative for chest pain. Neurological:  Negative for weakness. All other systems reviewed and are negative. Objective:    /78   Pulse 75   Wt 164 lb (74.4 kg)   BMI 30.00 kg/m²    BP Readings from Last 3 Encounters:   10/17/22 121/78   09/28/22 110/74   08/24/22 120/78     Physical Exam  Vitals reviewed. Constitutional:       General: She is awake. Cardiovascular:      Rate and Rhythm: Normal rate and regular rhythm. Heart sounds: Normal heart sounds. Pulmonary:      Effort: Pulmonary effort is normal.      Breath sounds: Normal breath sounds and air entry. Neurological:      Mental Status: She is alert. Psychiatric:         Behavior: Behavior is cooperative.        Lab Results   Component Value Date    WBC 6.4 10/07/2022    HGB 13.9 10/07/2022    HCT 42.7 10/07/2022     10/07/2022    CHOL 211 (H) 04/13/2022    TRIG 153 (H) 04/13/2022    HDL 62 04/13/2022    ALT 14 10/07/2022    AST 19 10/07/2022     10/07/2022    K 4.1 10/07/2022     10/07/2022    CREATININE 0.67 10/07/2022    BUN 17 10/07/2022    CO2 27 10/07/2022    TSH 1.68 04/13/2022    LABA1C 5.2 10/07/2022     Lab Results   Component Value Date    CALCIUM 9.3 10/07/2022     Lab Results   Component Value Date    LDLCHOLESTEROL 118 04/13/2022       Assessment and Plan:    1. Mixed hyperlipidemia  - Lipid panel completed on April showing LDL of 118, total cholesterol 211, triglycerides 153  - Patient did take Lipitor 10 mg daily only for 1 month, will start her on 20 mg daily for mixed hyperlipidemia  - atorvastatin (LIPITOR) 20 MG tablet; TAKE 1 TABLET BY MOUTH EVERY DAY  Dispense: 30 tablet; Refill: 3    2. Class 1 obesity due to excess calories without serious comorbidity with body mass index (BMI) of 30.0 to 30.9 in adult  - 3rd refill for Phentermine  - Has lost 15 lb in last 2 months  - Further management per PCP    3. Gastroesophageal reflux disease without esophagitis  - omeprazole (PRILOSEC) 20 MG delayed release capsule; TAKE 1 CAPSULE BY MOUTH EVERY DAY  Dispense: 30 capsule; Refill: 3    4. Encounter for weight management  - phentermine 37.5 MG capsule; TAKE 1 CAPSULE BY MOUTH EVERY DAY IN THE MORNING  Dispense: 30 capsule; Refill: 0    5.  Preoperative clearance  - RCRI - 0 points - Low risk for surgery  - Cleared from primary care perspective for left hip arthroplasty       Requested Prescriptions     Signed Prescriptions Disp Refills    phentermine 37.5 MG capsule 30 capsule 0     Sig: TAKE 1 CAPSULE BY MOUTH EVERY DAY IN THE MORNING    omeprazole (PRILOSEC) 20 MG delayed release capsule 30 capsule 3     Sig: TAKE 1 CAPSULE BY MOUTH EVERY DAY    atorvastatin (LIPITOR) 20 MG tablet 30 tablet 3     Sig: TAKE 1 TABLET BY MOUTH EVERY DAY       Medications Discontinued During This Encounter   Medication Reason    atorvastatin (LIPITOR) 10 MG tablet     phentermine 37.5 MG capsule REORDER    omeprazole (PRILOSEC) 20 MG delayed release capsule REORDER       Return in about 4 weeks (around 11/14/2022) for FU with PCP for obesity.

## 2022-10-17 NOTE — PROGRESS NOTES
Attending Physician Statement  I have discussed the care of Yadira Castro 62 y.o. female, including pertinent history and exam findings, with the resident Dr. Cornel Cuevas MD.    History and Exam:   Chief Complaint   Patient presents with    Follow-up     Follow up    Surgical Clearance     Ortho surgery clearance     Medication Refill     Medication refill       No past medical history on file. Allergies   Allergen Reactions    Penicillins      Nauseous       I have seen and examined the patient and the key elements of the encounter have been performed by me. BP Readings from Last 3 Encounters:   10/17/22 121/78   09/28/22 110/74   08/24/22 120/78     /78   Pulse 75   Wt 164 lb (74.4 kg)   BMI 30.00 kg/m²   Lab Results   Component Value Date    WBC 6.4 10/07/2022    HGB 13.9 10/07/2022    HCT 42.7 10/07/2022     10/07/2022    CHOL 211 (H) 04/13/2022    TRIG 153 (H) 04/13/2022    HDL 62 04/13/2022    ALT 14 10/07/2022    AST 19 10/07/2022     10/07/2022    K 4.1 10/07/2022     10/07/2022    CREATININE 0.67 10/07/2022    BUN 17 10/07/2022    CO2 27 10/07/2022    TSH 1.68 04/13/2022    LABA1C 5.2 10/07/2022     Lab Results   Component Value Date    LABALBU 4.1 10/07/2022     No results found for: IRON, TIBC, FERRITIN  Lab Results   Component Value Date    LDLCHOLESTEROL 118 04/13/2022     I agree with the assessment, plan and the diagnosis of    Diagnosis Orders   1. Mixed hyperlipidemia  atorvastatin (LIPITOR) 20 MG tablet      2. Class 1 obesity due to excess calories without serious comorbidity with body mass index (BMI) of 30.0 to 30.9 in adult  phentermine 37.5 MG capsule      3. Gastroesophageal reflux disease without esophagitis  omeprazole (PRILOSEC) 20 MG delayed release capsule      4. Encounter for weight management  phentermine 37.5 MG capsule      5. Preoperative clearance         . I agree with orders as documented by the resident. Recommendations:     The 10-year ASCVD risk score (Marialuisa HONEYCUTT, et al., 2019) is: 2.1%    Values used to calculate the score:      Age: 62 years      Sex: Female      Is Non- : No      Diabetic: No      Tobacco smoker: No      Systolic Blood Pressure: 039 mmHg      Is BP treated: No      HDL Cholesterol: 62 mg/dL      Total Cholesterol: 211 mg/dL      More than 25 minutes spent  in face to face encounter with the patient and more than half in counseling. Patient's questions were answered. Patient Voiced understanding to the counseling. Return in about 4 weeks (around 11/14/2022) for FU with PCP for obesity.    (GC Modifier)-Dr. Yenny Stanley MD

## 2022-10-17 NOTE — PROGRESS NOTES
Visit Information    Have you changed or started any medications since your last visit including any over-the-counter medicines, vitamins, or herbal medicines? no   Have you stopped taking any of your medications? Is so, why? -  no  Are you having any side effects from any of your medications? - no    Have you seen any other physician or provider since your last visit? yes - specialist   Have you had any other diagnostic tests since your last visit? yes - MRI   Have you been seen in the emergency room and/or had an admission in a hospital since we last saw you?  no   Have you had your routine dental cleaning in the past 6 months?  no     Do you have an active MyChart account? If no, what is the barrier?  YES    Patient Care Team:  Herman Schaumann, MD as PCP - General (Emergency Medicine)    Medical History Review  Past Medical, Family, and Social History reviewed and does not contribute to the patient presenting condition    Health Maintenance   Topic Date Due    Annual Wellness Visit (AWV)  Never done    Flu vaccine (1) Never done    COVID-19 Vaccine (1) 04/03/2023 (Originally 1965)    DTaP/Tdap/Td vaccine (1 - Tdap) 04/13/2023 (Originally 5/1/1984)    Shingles vaccine (1 of 2) 04/13/2023 (Originally 5/1/2015)    Lipids  04/13/2023    Depression Monitoring  04/13/2023    Breast cancer screen  05/03/2024    Colorectal Cancer Screen  05/03/2025    Hepatitis C screen  Completed    HIV screen  Completed    Hepatitis A vaccine  Aged Out    Hib vaccine  Aged Out    Meningococcal (ACWY) vaccine  Aged Out    Pneumococcal 0-64 years Vaccine  Aged Out

## 2022-10-20 ENCOUNTER — TELEPHONE (OUTPATIENT)
Dept: FAMILY MEDICINE CLINIC | Age: 57
End: 2022-10-20

## 2022-10-20 ENCOUNTER — PATIENT MESSAGE (OUTPATIENT)
Dept: ORTHOPEDIC SURGERY | Age: 57
End: 2022-10-20

## 2022-10-20 ENCOUNTER — HOSPITAL ENCOUNTER (OUTPATIENT)
Dept: PREADMISSION TESTING | Age: 57
Discharge: HOME | End: 2022-10-24
Payer: MEDICARE

## 2022-10-20 DIAGNOSIS — N39.0 URINARY TRACT INFECTION WITHOUT HEMATURIA, SITE UNSPECIFIED: Primary | ICD-10-CM

## 2022-10-20 LAB
ABO/RH: NORMAL
ANTIBODY SCREEN: NEGATIVE
ARM BAND NUMBER: NORMAL
BACTERIA: ABNORMAL
BILIRUBIN URINE: NEGATIVE
COLOR: YELLOW
EPITHELIAL CELLS UA: ABNORMAL /HPF (ref 0–5)
EXPIRATION DATE: NORMAL
GLUCOSE URINE: NEGATIVE
KETONES, URINE: ABNORMAL
LEUKOCYTE ESTERASE, URINE: ABNORMAL
MUCUS: ABNORMAL
NITRITE, URINE: POSITIVE
PH UA: 6 (ref 5–8)
PROTEIN UA: NEGATIVE
RBC UA: ABNORMAL /HPF (ref 0–2)
SPECIFIC GRAVITY UA: 1.02 (ref 1–1.03)
TURBIDITY: ABNORMAL
URINE HGB: NEGATIVE
UROBILINOGEN, URINE: NORMAL
WBC UA: ABNORMAL /HPF (ref 0–5)

## 2022-10-20 PROCEDURE — 87077 CULTURE AEROBIC IDENTIFY: CPT

## 2022-10-20 PROCEDURE — 87086 URINE CULTURE/COLONY COUNT: CPT

## 2022-10-20 PROCEDURE — 86900 BLOOD TYPING SEROLOGIC ABO: CPT

## 2022-10-20 PROCEDURE — 81001 URINALYSIS AUTO W/SCOPE: CPT

## 2022-10-20 PROCEDURE — 86850 RBC ANTIBODY SCREEN: CPT

## 2022-10-20 PROCEDURE — 36415 COLL VENOUS BLD VENIPUNCTURE: CPT

## 2022-10-20 PROCEDURE — 86901 BLOOD TYPING SEROLOGIC RH(D): CPT

## 2022-10-20 PROCEDURE — 87186 SC STD MICRODIL/AGAR DIL: CPT

## 2022-10-20 RX ORDER — CELECOXIB 200 MG/1
200 CAPSULE ORAL ONCE
Status: CANCELLED | OUTPATIENT
Start: 2022-11-01

## 2022-10-20 RX ORDER — GABAPENTIN 300 MG/1
300 CAPSULE ORAL ONCE
Status: CANCELLED | OUTPATIENT
Start: 2022-11-01

## 2022-10-20 RX ORDER — CYCLOBENZAPRINE HCL 5 MG
5 TABLET ORAL 3 TIMES DAILY PRN
COMMUNITY

## 2022-10-20 RX ORDER — ACETAMINOPHEN 500 MG
1000 TABLET ORAL ONCE
Status: CANCELLED | OUTPATIENT
Start: 2022-11-01

## 2022-10-20 RX ORDER — CIPROFLOXACIN 500 MG/1
500 TABLET, FILM COATED ORAL 2 TIMES DAILY
Qty: 6 TABLET | Refills: 0 | Status: SHIPPED | OUTPATIENT
Start: 2022-10-20 | End: 2022-10-23

## 2022-10-20 NOTE — TELEPHONE ENCOUNTER
From: Josselin Waldron  To:  Beronica Snyder  Sent: 10/20/2022 11:00 AM EDT  Subject: Urine test    Ive had to urine tests with possibilities of uti whats going on now

## 2022-10-20 NOTE — TELEPHONE ENCOUNTER
Had labs done for an upcoming surgery, had a UA done with positive results, patient  was told to call PCP to get treatment. Lab results are in her chart.  Please Advise

## 2022-10-20 NOTE — PRE-PROCEDURE INSTRUCTIONS
ARRIVE  [place] Tuesday, November 1,2022   Yen Lawson from [address] [place] [address] office will call you with a time to arrive to the hospital for surgery    Once you enter the hospital lobby, take the elevators to the second floor. Check-In is at the surgery registration desk. Continue to take your home medications as you normally do up to and including the night before surgery with the exception of any blood thinning medications. Please stop any blood thinning medications as directed by your surgeon or prescribing physician. Failure to stop certain medications may interfere with your scheduled surgery. These may include:  Aspirin, Warfarin (Coumadin), Clopidogrel (Plavix), Ibuprofen (Motrin, Advil), Naproxen (Aleve), Meloxicam (Mobic), Celecoxib (Celebrex), Eliquis, Pradaxa, Xarelto, Effient, Fish Oil, Herbal supplements. Stop Aleve 7 days before surgery    Please take the following medication(s) the day of surgery with a small sip of water:  Omeprazole      PREPARING FOR YOUR SURGERY:     Before surgery, you can play an important role in your own health. Because skin is not sterile, we need to be sure that your skin is as free of germs as possible before surgery by carefully washing before surgery. Preparing or prepping skin before surgery can reduce the risk of a surgical site infection.   Do not shave the area of your body where your surgery will be performed unless you received specific permission from your physician. You will need to shower at home the night before surgery and the morning of surgery with a special soap called chlorhexidine gluconate (CHG*). *Not to be used by people allergic to Chlorhexidine Gluconate (CHG). Following these instructions will help you be sure that your skin is clean before surgery. Instructions on cleaning your skin before surgery: The night before your surgery: You will need to shower with warm water (not hot) and the CHG soap.       Use a clean wash cloth and a clean towel. Have clean clothes available to put on after the shower. First wash your hair with regular shampoo. Rinse your hair and body thoroughly to remove the shampoo. Wash your face and genital area (private parts) with your regular soap or water only. Thoroughly rinse your body with warm water from the neck down. Turn water off to prevent rinsing the soap off too soon. With a clean wet washcloth and half of the CHG soap in the bottle, lather your entire body from the neck down. Do not use CHG soap near your eyes or ears to avoid injury to those areas. Wash thoroughly, paying special attention to the area where your surgery will be performed. Wash your body gently for five (5) minutes. Avoid scrubbing your skin too hard. Turn the water back on and rinse your body thoroughly. Pat yourself dry with a clean, soft towel. Do not apply lotion, cream or powder. Dress with clean freshly washed clothes. The morning of surgery:    Repeat shower following steps above - using remaining half of CHG soap in bottle. Patient Instructions: If you are having any type of anesthesia you are to have nothing to eat or drink after midnight the night before your surgery. This includes gum, hard candy, mints, water or smoking or chewing tobacco.  The only exception to this is a small sip of water to take with any morning dose of heart, blood pressure, or seizure medications. No alcoholic beverages for 24 hours prior to surgery. Brush your teeth but do not swallow water. Bring your eyeglasses and case with you. No contacts are to be worn the day of surgery. You also may bring your hearing aids. Most surgical procedures involving anesthesia will require that you remove your dentures prior to surgery. Do not wear any jewelry or body piercings day of surgery. Also, NO lotion, perfume or deodorant to be used the day of surgery.   No nail polish on the operative extremity (arm/leg surgeries)    If you are staying overnight with us, please bring a small bag of necessary personal items. Please wear loose, comfortable clothing. If you are potentially going to have a cast or brace bring clothing that will fit over them. In case of illness - If you have cold or flu like symptoms (high fever, runny nose, sore throat, cough, etc.) rash, nausea, vomiting, loose stools, and/or recent contact with someone who has a contagious disease (chicken pox, measles, etc.) Please call your doctor before coming to the hospital.         Day of Surgery/Procedure:    As a patient at [facility] [address] you can expect quality medical and nursing care that is centered on your individual needs. Our goal is to make your surgical experience as comfortable as possible    . Transportation After Your Surgery/Procedure: You will need a friend or family member to drive you home after your procedure. Your  must be 25years of age or older and able to sign off on your discharge instructions. A taxi cab or any other form of public transportation is not acceptable. Your friend or family member must stay at the hospital throughout your procedure. Someone must remain with you for the first 24 hours after your surgery if you receive anesthesia or medication. If you do not have someone to stay with you, your procedure may be cancelled.       If you have any other questions regarding your procedure or the day of surgery, please call [phone]      _________________________  ____________________________  Signature (Patient)              Signature (Provider) & date

## 2022-10-21 LAB
CULTURE: ABNORMAL
SPECIMEN DESCRIPTION: ABNORMAL

## 2022-10-25 DIAGNOSIS — M16.12 ARTHRITIS OF LEFT HIP: Primary | ICD-10-CM

## 2022-10-25 DIAGNOSIS — Z98.890 STATUS POST SURGERY: ICD-10-CM

## 2022-10-25 NOTE — PROGRESS NOTES
[facility] ([facility]) Joint Replacement Pre-surgical Assessment    Scheduled Surgery Date: 11/01/2022  Surgery Time: 1115    Surgeon: Sil Maradiaga  Procedure: left Total Hip    Primary Insurance Coverage [facility]  Pre-op class attended YES    PCP: Natalie Berman MD  Clearance received by PCP: Yes    Anticipated Discharge Plan: home  Agency (if applicable): UNSURE    Significant PMH:   Surgical History    Procedure Laterality Date Comment Source   BLADDER SUSPENSION   x3    CARPAL TUNNEL RELEASE Bilateral      COLONOSCOPY       ENDOSCOPY, COLON, DIAGNOSTIC       HERNIA REPAIR   umbilical    HYSTERECTOMY (CERVIX STATUS UNKNOWN)       OVARY REMOVAL       STOMACH SURGERY   Gastric Sleeve      ED Notes    ED Notes    Medical History    Diagnosis Date Comment Source   Arthritis      Heartburn      Hyperlipidemia             Smoking history: the patient is a former smoker who quit smoking on 2000. Alcohol history: Current alcohol use: SOCIAL. Concerns prior to surgery: PT MET WITH OT AFTER CLASS. PT IS AWARE OF UTI AND HAS CONTACTED HER PCP FOR ATB AND IS CURRENTLY BEING TREATED.     Electronically signed by: Torey Aguilera RN on 10/25/2022 at 10:43 AM

## 2022-10-31 ENCOUNTER — ANESTHESIA EVENT (OUTPATIENT)
Dept: OPERATING ROOM | Age: 57
End: 2022-10-31
Payer: MEDICARE

## 2022-10-31 NOTE — PLAN OF CARE
PROTOCOLS  NURSING IMPLEMENTED    TOTAL JOINT DVT/PE  VENOUS THROMBOEMBOLISM PROPHYLAXIS  (Nursing Automatically Implement)    Kathya Perea  [id number]  [unfilled]  10/31/22    YES DVT RISK FACTOR SCORE YES MAJOR BLEEDING RISK FACTORS SCORE     [x] 48years old or greater (1)   [] Hx. Easy Bleeding (1)      [] Heart failure (2)   [] NSAID Use in Last 5 Days (2)      [] Varicose veins - Hx. (1)   [] Gastrointestinal or Genitourinary bleeding in Last 14 Days (2)      [] Myocardial Infarction - Hx. (1)         [] Cancer - Hx. (2)         [] Atrial fibrillation - Hx. (1)         [] Ischemic Stroke - Hx. (1)         [] Diabetes Mellitus - Hx. (1)         [] Previous DVT/PE - Hx.  (2)         [] Hormone Replacement Therapy (1)         [x] Obesity (1)         [] Paralysis (1)         [] Pregnancy (1)         [] Smoking (1)                   [] Thromophilia (1)   []   Mild to Moderate Bleeding (2)      [x] Total Hip Arthroplasty (1)   [] Active Bleeding (4)      [] Family history of PE or DVT? (4) (Consider the following labs to test for presence of inhibitor deficiency state:) Factor V Leiden, Prothrombin Gene Mutation, Protein S Deficiency, Protien C Deficiency, Antithrombin Deficiency   [] Malignant Hypertension (2)        [] Thrombocytopenia 20k to 100k (2)        [] Thrombocytopenia less than 20k (4)        [] Bleeding Diathesis (4)        [] \"Bloody Stick\" Epidural or Spinal (2)     TOTAL DVT SCORE   TOTAL BLEEDING SCORE      [x] CLASS A   Standard Risk DVT (0-3)    [x] CLASS X Standard Risk Bleeding (0-4)      [] CLASS B Elevated Risk DVT (greater than 3)    [] CLASS Y High Risk Bleeding (greater than 4)     FINAL MATRIX (e.g. AY)       *If allergic to ASA use Warfarin  *BY patient consider no treatment  **Consider venous filter with high risk PE  **If on Coumadin pre-op, then restart night of surgery      [x]  DVT Prophylaxis: Class AX, AY    Ecotrin 81 mg by mouth BID starting day of surgery for 6 weeks for all total joints. (If allergic to aspirin, give eliquis 2.5mg BID X 14 days (knees) or 35 days  (hips) starting first day postop at 0600). []  DVT Prophylaxis:  Class BX (angela choice)    []Eliquis 2.5mg BID x 14 days (knees) or 35 days (hips) starting first day postop      at 0600      [] Lovenox 40 mg subcu daily starting first day postop at 0600 x 14 days                             (knees) or 35 days (hips)    Ecotrin 81 mg PO BID-start when Lovenox is finished. (Teach injection prior to discharge.)       [] Xarelto 10 mg by mouth daily starting first day postop at 0600     14 days (knees) or 35 days (hips). If creatinine clearance less than 30 mL/min, give Lovenox 30 mg subcu   Daily starting first day postop at 0600. Ecotrin 81 mg PO BID-starting   when Lovenox is finished. (Teach injection prior to      discharge.)  (For discharge fill throughout the 10 mg daily with no    refills.)      []  DVT Prophylaxis:  Class BY (agnela choice)               []  Eliquis 2.5mg BID x 14 days (knees) or 35 days (hips) starting first day                              postop at 0600        []  Ecotrin 81 mg PO BID x 6weeks (all joints)      []  Lovenox 40mg SQ daily to start at 0600 first day post-Op day. 14 days for knees, 35 days for hips    Ecotrin 81 mg PO BID - start when Lovenox is finished. (Teach injection prior to discharge.)       [] Xarelto 10 mg PO daily starting first day Post-Op at 0600    14 days (knees) or 35 days (hips)    If Creatinine Clearance less than 30ml/min, give Lovenox 30 mg    SQ daily starting first day Post-Op at 0600 x 14 days (knees) or 35   days (hips). Ecotrin 81 mg PO BID - start when Lovenox is finished.     (Teach injection prior to discharge.)  (For discharge fill throughout the   10 mg daily #32 with no refills.)      Electronically signed by Tasha Beal DO on 10/31/2022 at 5:31 PM

## 2022-11-01 ENCOUNTER — APPOINTMENT (OUTPATIENT)
Dept: GENERAL RADIOLOGY | Age: 57
End: 2022-11-01
Attending: ORTHOPAEDIC SURGERY
Payer: MEDICARE

## 2022-11-01 ENCOUNTER — ANESTHESIA (OUTPATIENT)
Dept: OPERATING ROOM | Age: 57
End: 2022-11-01
Payer: MEDICARE

## 2022-11-01 ENCOUNTER — HOSPITAL ENCOUNTER (OUTPATIENT)
Age: 57
Discharge: HOME | End: 2022-11-01
Attending: ORTHOPAEDIC SURGERY | Admitting: ORTHOPAEDIC SURGERY
Payer: MEDICARE

## 2022-11-01 VITALS
RESPIRATION RATE: 18 BRPM | WEIGHT: 166.3 LBS | HEIGHT: 63 IN | HEART RATE: 90 BPM | DIASTOLIC BLOOD PRESSURE: 65 MMHG | SYSTOLIC BLOOD PRESSURE: 106 MMHG | OXYGEN SATURATION: 100 % | TEMPERATURE: 98.1 F | BODY MASS INDEX: 29.46 KG/M2

## 2022-11-01 DIAGNOSIS — G89.18 POST-OP PAIN: Primary | ICD-10-CM

## 2022-11-01 PROBLEM — M16.12 ARTHRITIS OF LEFT HIP: Status: ACTIVE | Noted: 2022-11-01

## 2022-11-01 PROBLEM — Z96.652 S/P TOTAL KNEE ARTHROPLASTY, LEFT: Status: ACTIVE | Noted: 2022-11-01

## 2022-11-01 LAB
HCT VFR BLD CALC: 34.6 % (ref 36.3–47.1)
HEMOGLOBIN: 10.7 G/DL (ref 11.9–15.1)

## 2022-11-01 PROCEDURE — 3209999900 FLUORO FOR SURGICAL PROCEDURES

## 2022-11-01 PROCEDURE — 6360000002 HC RX W HCPCS: Performed by: STUDENT IN AN ORGANIZED HEALTH CARE EDUCATION/TRAINING PROGRAM

## 2022-11-01 PROCEDURE — 6360000002 HC RX W HCPCS: Performed by: ANESTHESIOLOGY

## 2022-11-01 PROCEDURE — 2580000003 HC RX 258: Performed by: STUDENT IN AN ORGANIZED HEALTH CARE EDUCATION/TRAINING PROGRAM

## 2022-11-01 PROCEDURE — 97110 THERAPEUTIC EXERCISES: CPT

## 2022-11-01 PROCEDURE — 3700000001 HC ADD 15 MINUTES (ANESTHESIA): Performed by: ORTHOPAEDIC SURGERY

## 2022-11-01 PROCEDURE — 7100000001 HC PACU RECOVERY - ADDTL 15 MIN: Performed by: ORTHOPAEDIC SURGERY

## 2022-11-01 PROCEDURE — C1776 JOINT DEVICE (IMPLANTABLE): HCPCS | Performed by: ORTHOPAEDIC SURGERY

## 2022-11-01 PROCEDURE — 7100000000 HC PACU RECOVERY - FIRST 15 MIN: Performed by: ORTHOPAEDIC SURGERY

## 2022-11-01 PROCEDURE — 6360000002 HC RX W HCPCS: Performed by: SPECIALIST

## 2022-11-01 PROCEDURE — 85014 HEMATOCRIT: CPT

## 2022-11-01 PROCEDURE — 2500000003 HC RX 250 WO HCPCS: Performed by: ANESTHESIOLOGY

## 2022-11-01 PROCEDURE — 2720000010 HC SURG SUPPLY STERILE: Performed by: ORTHOPAEDIC SURGERY

## 2022-11-01 PROCEDURE — 27130 TOTAL HIP ARTHROPLASTY: CPT | Performed by: ORTHOPAEDIC SURGERY

## 2022-11-01 PROCEDURE — 2709999900 HC NON-CHARGEABLE SUPPLY: Performed by: ORTHOPAEDIC SURGERY

## 2022-11-01 PROCEDURE — 6360000002 HC RX W HCPCS: Performed by: ORTHOPAEDIC SURGERY

## 2022-11-01 PROCEDURE — 3700000000 HC ANESTHESIA ATTENDED CARE: Performed by: ORTHOPAEDIC SURGERY

## 2022-11-01 PROCEDURE — 97530 THERAPEUTIC ACTIVITIES: CPT

## 2022-11-01 PROCEDURE — 3600000005 HC SURGERY LEVEL 5 BASE: Performed by: ORTHOPAEDIC SURGERY

## 2022-11-01 PROCEDURE — 6370000000 HC RX 637 (ALT 250 FOR IP): Performed by: ANESTHESIOLOGY

## 2022-11-01 PROCEDURE — 6370000000 HC RX 637 (ALT 250 FOR IP): Performed by: STUDENT IN AN ORGANIZED HEALTH CARE EDUCATION/TRAINING PROGRAM

## 2022-11-01 PROCEDURE — 97535 SELF CARE MNGMENT TRAINING: CPT

## 2022-11-01 PROCEDURE — 97162 PT EVAL MOD COMPLEX 30 MIN: CPT

## 2022-11-01 PROCEDURE — 73502 X-RAY EXAM HIP UNI 2-3 VIEWS: CPT

## 2022-11-01 PROCEDURE — 2500000003 HC RX 250 WO HCPCS: Performed by: SPECIALIST

## 2022-11-01 PROCEDURE — 85018 HEMOGLOBIN: CPT

## 2022-11-01 PROCEDURE — 97166 OT EVAL MOD COMPLEX 45 MIN: CPT

## 2022-11-01 PROCEDURE — 2580000003 HC RX 258: Performed by: ORTHOPAEDIC SURGERY

## 2022-11-01 PROCEDURE — 2500000003 HC RX 250 WO HCPCS: Performed by: ORTHOPAEDIC SURGERY

## 2022-11-01 PROCEDURE — 3600000015 HC SURGERY LEVEL 5 ADDTL 15MIN: Performed by: ORTHOPAEDIC SURGERY

## 2022-11-01 PROCEDURE — 36415 COLL VENOUS BLD VENIPUNCTURE: CPT

## 2022-11-01 PROCEDURE — 64450 NJX AA&/STRD OTHER PN/BRANCH: CPT | Performed by: ANESTHESIOLOGY

## 2022-11-01 PROCEDURE — 97116 GAIT TRAINING THERAPY: CPT

## 2022-11-01 DEVICE — FEMORAL HEAD Ø 28 SIZE M
Type: IMPLANTABLE DEVICE | Site: HIP | Status: FUNCTIONAL
Brand: MECTACER BIOLOX DELTA FEMORAL BALL HEAD

## 2022-11-01 DEVICE — MASTERLOC CEMENTLESS TI COATED LAT STEM # 9
Type: IMPLANTABLE DEVICE | Site: HIP | Status: FUNCTIONAL
Brand: MASTERLOC FEMORAL STEMS

## 2022-11-01 DEVICE — DOUBLE MOBILITY ACETABULAR SHELL Ø54
Type: IMPLANTABLE DEVICE | Site: HIP | Status: FUNCTIONAL
Brand: MPACT DOUBLE MOBILITY SHELLS

## 2022-11-01 DEVICE — HC PE LINER 28 / DMG
Type: IMPLANTABLE DEVICE | Site: HIP | Status: FUNCTIONAL
Brand: DOUBLE MOBILITY LINER

## 2022-11-01 RX ORDER — FENTANYL CITRATE 50 UG/ML
25 INJECTION, SOLUTION INTRAMUSCULAR; INTRAVENOUS EVERY 5 MIN PRN
Status: DISCONTINUED | OUTPATIENT
Start: 2022-11-01 | End: 2022-11-01 | Stop reason: HOSPADM

## 2022-11-01 RX ORDER — CLINDAMYCIN PHOSPHATE 900 MG/50ML
900 INJECTION INTRAVENOUS ONCE
Status: COMPLETED | OUTPATIENT
Start: 2022-11-01 | End: 2022-11-01

## 2022-11-01 RX ORDER — EPHEDRINE SULFATE/0.9% NACL/PF 50 MG/5 ML
SYRINGE (ML) INTRAVENOUS PRN
Status: DISCONTINUED | OUTPATIENT
Start: 2022-11-01 | End: 2022-11-01 | Stop reason: SDUPTHER

## 2022-11-01 RX ORDER — CELECOXIB 200 MG/1
200 CAPSULE ORAL ONCE
Status: DISCONTINUED | OUTPATIENT
Start: 2022-11-01 | End: 2022-11-01

## 2022-11-01 RX ORDER — MAGNESIUM HYDROXIDE 1200 MG/15ML
LIQUID ORAL CONTINUOUS PRN
Status: COMPLETED | OUTPATIENT
Start: 2022-11-01 | End: 2022-11-01

## 2022-11-01 RX ORDER — LIDOCAINE HYDROCHLORIDE 10 MG/ML
1 INJECTION, SOLUTION EPIDURAL; INFILTRATION; INTRACAUDAL; PERINEURAL
Status: DISCONTINUED | OUTPATIENT
Start: 2022-11-01 | End: 2022-11-01 | Stop reason: HOSPADM

## 2022-11-01 RX ORDER — GABAPENTIN 100 MG/1
100 CAPSULE ORAL NIGHTLY
Status: DISCONTINUED | OUTPATIENT
Start: 2022-11-01 | End: 2022-11-01 | Stop reason: HOSPADM

## 2022-11-01 RX ORDER — VANCOMYCIN HYDROCHLORIDE 1 G/20ML
INJECTION, POWDER, LYOPHILIZED, FOR SOLUTION INTRAVENOUS
Status: DISPENSED
Start: 2022-11-01 | End: 2022-11-01

## 2022-11-01 RX ORDER — FENTANYL CITRATE 50 UG/ML
INJECTION, SOLUTION INTRAMUSCULAR; INTRAVENOUS PRN
Status: DISCONTINUED | OUTPATIENT
Start: 2022-11-01 | End: 2022-11-01 | Stop reason: SDUPTHER

## 2022-11-01 RX ORDER — ACETAMINOPHEN 500 MG
1000 TABLET ORAL ONCE
Status: COMPLETED | OUTPATIENT
Start: 2022-11-01 | End: 2022-11-01

## 2022-11-01 RX ORDER — BISACODYL 5 MG/1
5 TABLET, DELAYED RELEASE ORAL DAILY PRN
Status: DISCONTINUED | OUTPATIENT
Start: 2022-11-01 | End: 2022-11-01 | Stop reason: HOSPADM

## 2022-11-01 RX ORDER — TRANEXAMIC ACID 100 MG/ML
INJECTION, SOLUTION INTRAVENOUS
Status: COMPLETED
Start: 2022-11-01 | End: 2022-11-01

## 2022-11-01 RX ORDER — SODIUM CHLORIDE 0.9 % (FLUSH) 0.9 %
5-40 SYRINGE (ML) INJECTION EVERY 12 HOURS SCHEDULED
Status: DISCONTINUED | OUTPATIENT
Start: 2022-11-01 | End: 2022-11-01 | Stop reason: HOSPADM

## 2022-11-01 RX ORDER — SODIUM CHLORIDE 0.9 % (FLUSH) 0.9 %
5-40 SYRINGE (ML) INJECTION PRN
Status: DISCONTINUED | OUTPATIENT
Start: 2022-11-01 | End: 2022-11-01 | Stop reason: HOSPADM

## 2022-11-01 RX ORDER — SODIUM CHLORIDE 9 MG/ML
INJECTION, SOLUTION INTRAVENOUS CONTINUOUS
Status: DISCONTINUED | OUTPATIENT
Start: 2022-11-01 | End: 2022-11-01

## 2022-11-01 RX ORDER — CYCLOBENZAPRINE HCL 5 MG
5 TABLET ORAL 3 TIMES DAILY PRN
Status: DISCONTINUED | OUTPATIENT
Start: 2022-11-01 | End: 2022-11-01 | Stop reason: HOSPADM

## 2022-11-01 RX ORDER — HYDROMORPHONE HYDROCHLORIDE 1 MG/ML
0.5 INJECTION, SOLUTION INTRAMUSCULAR; INTRAVENOUS; SUBCUTANEOUS EVERY 5 MIN PRN
Status: DISCONTINUED | OUTPATIENT
Start: 2022-11-01 | End: 2022-11-01 | Stop reason: HOSPADM

## 2022-11-01 RX ORDER — GABAPENTIN 600 MG/1
900 TABLET ORAL ONCE
Status: DISCONTINUED | OUTPATIENT
Start: 2022-11-01 | End: 2022-11-01

## 2022-11-01 RX ORDER — SODIUM CHLORIDE, SODIUM LACTATE, POTASSIUM CHLORIDE, CALCIUM CHLORIDE 600; 310; 30; 20 MG/100ML; MG/100ML; MG/100ML; MG/100ML
INJECTION, SOLUTION INTRAVENOUS CONTINUOUS
Status: DISCONTINUED | OUTPATIENT
Start: 2022-11-01 | End: 2022-11-01

## 2022-11-01 RX ORDER — KETOROLAC TROMETHAMINE 30 MG/ML
30 INJECTION, SOLUTION INTRAMUSCULAR; INTRAVENOUS EVERY 6 HOURS
Status: DISCONTINUED | OUTPATIENT
Start: 2022-11-01 | End: 2022-11-01 | Stop reason: HOSPADM

## 2022-11-01 RX ORDER — OXYCODONE HYDROCHLORIDE 5 MG/1
5 TABLET ORAL EVERY 4 HOURS PRN
Status: DISCONTINUED | OUTPATIENT
Start: 2022-11-01 | End: 2022-11-01 | Stop reason: HOSPADM

## 2022-11-01 RX ORDER — VANCOMYCIN HYDROCHLORIDE 1 G/20ML
INJECTION, POWDER, LYOPHILIZED, FOR SOLUTION INTRAVENOUS PRN
Status: DISCONTINUED | OUTPATIENT
Start: 2022-11-01 | End: 2022-11-01 | Stop reason: ALTCHOICE

## 2022-11-01 RX ORDER — OXYCODONE HYDROCHLORIDE 5 MG/1
5 TABLET ORAL
Status: DISCONTINUED | OUTPATIENT
Start: 2022-11-01 | End: 2022-11-01 | Stop reason: HOSPADM

## 2022-11-01 RX ORDER — TRANEXAMIC ACID 100 MG/ML
INJECTION, SOLUTION INTRAVENOUS PRN
Status: DISCONTINUED | OUTPATIENT
Start: 2022-11-01 | End: 2022-11-01 | Stop reason: SDUPTHER

## 2022-11-01 RX ORDER — DEXAMETHASONE SODIUM PHOSPHATE 10 MG/ML
10 INJECTION, SOLUTION INTRAMUSCULAR; INTRAVENOUS ONCE
Status: DISCONTINUED | OUTPATIENT
Start: 2022-11-01 | End: 2022-11-01 | Stop reason: HOSPADM

## 2022-11-01 RX ORDER — OMEPRAZOLE 20 MG/1
20 CAPSULE, DELAYED RELEASE ORAL EVERY MORNING
Status: DISCONTINUED | OUTPATIENT
Start: 2022-11-01 | End: 2022-11-01 | Stop reason: HOSPADM

## 2022-11-01 RX ORDER — MIDAZOLAM HYDROCHLORIDE 1 MG/ML
2 INJECTION INTRAMUSCULAR; INTRAVENOUS ONCE
Status: COMPLETED | OUTPATIENT
Start: 2022-11-01 | End: 2022-11-01

## 2022-11-01 RX ORDER — PHENYLEPHRINE HCL IN 0.9% NACL 1 MG/10 ML
VIAL (ML) INTRAVENOUS PRN
Status: DISCONTINUED | OUTPATIENT
Start: 2022-11-01 | End: 2022-11-01 | Stop reason: SDUPTHER

## 2022-11-01 RX ORDER — ACETAMINOPHEN 500 MG
1000 TABLET ORAL ONCE
Status: DISCONTINUED | OUTPATIENT
Start: 2022-11-01 | End: 2022-11-01

## 2022-11-01 RX ORDER — OXYCODONE HYDROCHLORIDE 5 MG/1
10 TABLET ORAL EVERY 4 HOURS PRN
Status: DISCONTINUED | OUTPATIENT
Start: 2022-11-01 | End: 2022-11-01 | Stop reason: HOSPADM

## 2022-11-01 RX ORDER — GABAPENTIN 300 MG/1
300 CAPSULE ORAL ONCE
Status: COMPLETED | OUTPATIENT
Start: 2022-11-01 | End: 2022-11-01

## 2022-11-01 RX ORDER — SODIUM CHLORIDE 9 MG/ML
INJECTION, SOLUTION INTRAVENOUS CONTINUOUS
Status: DISCONTINUED | OUTPATIENT
Start: 2022-11-01 | End: 2022-11-01 | Stop reason: HOSPADM

## 2022-11-01 RX ORDER — ONDANSETRON 2 MG/ML
4 INJECTION INTRAMUSCULAR; INTRAVENOUS EVERY 6 HOURS PRN
Status: DISCONTINUED | OUTPATIENT
Start: 2022-11-01 | End: 2022-11-01 | Stop reason: HOSPADM

## 2022-11-01 RX ORDER — PHENTERMINE HYDROCHLORIDE 37.5 MG/1
37.5 CAPSULE ORAL
Status: DISCONTINUED | OUTPATIENT
Start: 2022-11-02 | End: 2022-11-01 | Stop reason: HOSPADM

## 2022-11-01 RX ORDER — LIDOCAINE HYDROCHLORIDE 20 MG/ML
INJECTION, SOLUTION EPIDURAL; INFILTRATION; INTRACAUDAL; PERINEURAL PRN
Status: DISCONTINUED | OUTPATIENT
Start: 2022-11-01 | End: 2022-11-01 | Stop reason: SDUPTHER

## 2022-11-01 RX ORDER — LIDOCAINE HYDROCHLORIDE 10 MG/ML
INJECTION, SOLUTION INFILTRATION; PERINEURAL PRN
Status: DISCONTINUED | OUTPATIENT
Start: 2022-11-01 | End: 2022-11-01 | Stop reason: SDUPTHER

## 2022-11-01 RX ORDER — ACETAMINOPHEN 325 MG/1
650 TABLET ORAL EVERY 6 HOURS
Status: DISCONTINUED | OUTPATIENT
Start: 2022-11-01 | End: 2022-11-01 | Stop reason: HOSPADM

## 2022-11-01 RX ORDER — DOCUSATE SODIUM 100 MG/1
100 CAPSULE, LIQUID FILLED ORAL 2 TIMES DAILY PRN
Qty: 60 CAPSULE | Refills: 0 | Status: SHIPPED | OUTPATIENT
Start: 2022-11-01

## 2022-11-01 RX ORDER — BUPIVACAINE HYDROCHLORIDE 7.5 MG/ML
INJECTION, SOLUTION INTRASPINAL PRN
Status: DISCONTINUED | OUTPATIENT
Start: 2022-11-01 | End: 2022-11-01 | Stop reason: SDUPTHER

## 2022-11-01 RX ORDER — CELECOXIB 200 MG/1
200 CAPSULE ORAL ONCE
Status: COMPLETED | OUTPATIENT
Start: 2022-11-01 | End: 2022-11-01

## 2022-11-01 RX ORDER — ASPIRIN 81 MG/1
81 TABLET ORAL 2 TIMES DAILY
Qty: 84 TABLET | Refills: 0 | Status: SHIPPED | OUTPATIENT
Start: 2022-11-01 | End: 2023-01-17

## 2022-11-01 RX ORDER — SODIUM CHLORIDE 9 MG/ML
INJECTION, SOLUTION INTRAVENOUS PRN
Status: DISCONTINUED | OUTPATIENT
Start: 2022-11-01 | End: 2022-11-01 | Stop reason: HOSPADM

## 2022-11-01 RX ORDER — ONDANSETRON 2 MG/ML
4 INJECTION INTRAMUSCULAR; INTRAVENOUS
Status: DISCONTINUED | OUTPATIENT
Start: 2022-11-01 | End: 2022-11-01 | Stop reason: HOSPADM

## 2022-11-01 RX ORDER — PREGABALIN 75 MG/1
75 CAPSULE ORAL ONCE
Status: DISCONTINUED | OUTPATIENT
Start: 2022-11-01 | End: 2022-11-01

## 2022-11-01 RX ORDER — ONDANSETRON 4 MG/1
4 TABLET, ORALLY DISINTEGRATING ORAL EVERY 8 HOURS PRN
Status: DISCONTINUED | OUTPATIENT
Start: 2022-11-01 | End: 2022-11-01 | Stop reason: HOSPADM

## 2022-11-01 RX ORDER — ATORVASTATIN CALCIUM 20 MG/1
1 TABLET, FILM COATED ORAL DAILY
Status: DISCONTINUED | OUTPATIENT
Start: 2022-11-01 | End: 2022-11-01 | Stop reason: HOSPADM

## 2022-11-01 RX ORDER — PROPOFOL 10 MG/ML
INJECTION, EMULSION INTRAVENOUS CONTINUOUS PRN
Status: DISCONTINUED | OUTPATIENT
Start: 2022-11-01 | End: 2022-11-01 | Stop reason: SDUPTHER

## 2022-11-01 RX ORDER — ROPIVACAINE HYDROCHLORIDE 2 MG/ML
INJECTION, SOLUTION EPIDURAL; INFILTRATION; PERINEURAL PRN
Status: DISCONTINUED | OUTPATIENT
Start: 2022-11-01 | End: 2022-11-01 | Stop reason: SDUPTHER

## 2022-11-01 RX ORDER — ASPIRIN 81 MG/1
81 TABLET ORAL 2 TIMES DAILY
Status: DISCONTINUED | OUTPATIENT
Start: 2022-11-02 | End: 2022-11-01 | Stop reason: HOSPADM

## 2022-11-01 RX ORDER — OXYCODONE HYDROCHLORIDE AND ACETAMINOPHEN 5; 325 MG/1; MG/1
1 TABLET ORAL EVERY 6 HOURS PRN
Qty: 28 TABLET | Refills: 0 | Status: SHIPPED | OUTPATIENT
Start: 2022-11-01 | End: 2022-11-08

## 2022-11-01 RX ADMIN — Medication 200 MCG: at 08:11

## 2022-11-01 RX ADMIN — CELECOXIB 200 MG: 200 CAPSULE ORAL at 06:39

## 2022-11-01 RX ADMIN — Medication 10 MG: at 08:10

## 2022-11-01 RX ADMIN — KETOROLAC TROMETHAMINE 30 MG: 30 INJECTION, SOLUTION INTRAMUSCULAR; INTRAVENOUS at 11:14

## 2022-11-01 RX ADMIN — TRANEXAMIC ACID 1000 MG: 100 INJECTION, SOLUTION INTRAVENOUS at 07:41

## 2022-11-01 RX ADMIN — LIDOCAINE HYDROCHLORIDE 3 ML: 10 INJECTION, SOLUTION INFILTRATION; PERINEURAL at 07:09

## 2022-11-01 RX ADMIN — Medication 20 MG: at 08:15

## 2022-11-01 RX ADMIN — GABAPENTIN 300 MG: 300 CAPSULE ORAL at 06:39

## 2022-11-01 RX ADMIN — SODIUM CHLORIDE, POTASSIUM CHLORIDE, SODIUM LACTATE AND CALCIUM CHLORIDE: 600; 310; 30; 20 INJECTION, SOLUTION INTRAVENOUS at 06:15

## 2022-11-01 RX ADMIN — OXYCODONE 5 MG: 5 TABLET ORAL at 19:49

## 2022-11-01 RX ADMIN — MIDAZOLAM 2 MG: 1 INJECTION INTRAMUSCULAR; INTRAVENOUS at 07:08

## 2022-11-01 RX ADMIN — Medication 50 MCG: at 07:18

## 2022-11-01 RX ADMIN — ROPIVACAINE HYDROCHLORIDE 30 ML: 2 INJECTION, SOLUTION EPIDURAL; INFILTRATION at 07:09

## 2022-11-01 RX ADMIN — BUPIVACAINE HYDROCHLORIDE IN DEXTROSE 1.6 ML: 7.5 INJECTION, SOLUTION SUBARACHNOID at 07:32

## 2022-11-01 RX ADMIN — CLINDAMYCIN IN 5 PERCENT DEXTROSE 900 MG: 18 INJECTION, SOLUTION INTRAVENOUS at 07:34

## 2022-11-01 RX ADMIN — SODIUM CHLORIDE: 9 INJECTION, SOLUTION INTRAVENOUS at 11:05

## 2022-11-01 RX ADMIN — PROPOFOL 100 MCG/KG/MIN: 10 INJECTION, EMULSION INTRAVENOUS at 07:33

## 2022-11-01 RX ADMIN — LIDOCAINE HYDROCHLORIDE 80 MG: 20 INJECTION, SOLUTION EPIDURAL; INFILTRATION; INTRACAUDAL; PERINEURAL at 07:33

## 2022-11-01 RX ADMIN — Medication 200 MCG: at 08:49

## 2022-11-01 RX ADMIN — Medication 20 MG: at 08:49

## 2022-11-01 RX ADMIN — ACETAMINOPHEN 1000 MG: 500 TABLET ORAL at 06:39

## 2022-11-01 RX ADMIN — TRANEXAMIC ACID 1000 MG: 100 INJECTION, SOLUTION INTRAVENOUS at 08:46

## 2022-11-01 RX ADMIN — ONDANSETRON 4 MG: 4 TABLET, ORALLY DISINTEGRATING ORAL at 11:14

## 2022-11-01 RX ADMIN — Medication 200 MCG: at 08:01

## 2022-11-01 RX ADMIN — Medication 200 MCG: at 07:55

## 2022-11-01 ASSESSMENT — PAIN - FUNCTIONAL ASSESSMENT
PAIN_FUNCTIONAL_ASSESSMENT: PREVENTS OR INTERFERES WITH MANY ACTIVE NOT PASSIVE ACTIVITIES
PAIN_FUNCTIONAL_ASSESSMENT: 0-10
PAIN_FUNCTIONAL_ASSESSMENT: PREVENTS OR INTERFERES SOME ACTIVE ACTIVITIES AND ADLS

## 2022-11-01 ASSESSMENT — PAIN DESCRIPTION - LOCATION: LOCATION: KNEE;HIP

## 2022-11-01 ASSESSMENT — PAIN SCALES - GENERAL
PAINLEVEL_OUTOF10: 6
PAINLEVEL_OUTOF10: 8
PAINLEVEL_OUTOF10: 4

## 2022-11-01 ASSESSMENT — PAIN DESCRIPTION - DESCRIPTORS
DESCRIPTORS: ACHING;THROBBING
DESCRIPTORS: ACHING;DISCOMFORT

## 2022-11-01 ASSESSMENT — PAIN DESCRIPTION - FREQUENCY: FREQUENCY: INTERMITTENT

## 2022-11-01 ASSESSMENT — PAIN DESCRIPTION - PAIN TYPE: TYPE: SURGICAL PAIN

## 2022-11-01 ASSESSMENT — PAIN DESCRIPTION - ONSET: ONSET: SUDDEN

## 2022-11-01 ASSESSMENT — PAIN DESCRIPTION - ORIENTATION: ORIENTATION: LEFT

## 2022-11-01 NOTE — BRIEF OP NOTE
Brief Postoperative Note      Patient: Tianna Talavera  YOB: 1965  MRN: [id number]    Date of Procedure: 11/1/2022    Pre-Op Diagnosis: Left hip osteoarthritis    Post-Op Diagnosis: Left hip osteoarthritis       Procedure(s): Left total hip arthroplasty    Surgeon(s): Marylee Alf, DO    Assistant: Resident: Brina Alonzo DO; Osman Sky DO    Anesthesia: Spinal    Estimated Blood Loss (mL): 441 mL    Complications: None    Specimens: * No specimens in log *    Implants:  Implant Name Type Inv.  Item Serial No.  Lot No. LRB No. Used Action   SHELL ACET OD54MM LNR DMG DBL MOBILITY MPACT - [id number]  SHELL ACET OD54MM LNR DMG DBL MOBILITY MPACT  [employer] [facility]- [id number] Left 1 Implanted   STEM FEM SZ 9 LAT HIP MECTAGRIP CEMENTLESS FLAT DBL TAPR - [id number]  STEM FEM SZ 9 LAT HIP MECTAGRIP CEMENTLESS FLAT DBL TAPR  [employer] [facility]- [id number] Left 1 Implanted   LINER ACET SZ DMG OD54MM ID28MM GRN DBL MOBILITY HIGHCROSS - [id number]  LINER ACET SZ DMG OD54MM ID28MM GRN DBL MOBILITY HIGHCROSS  [employer] [place] [id number] Left 1 Implanted   HEAD FEM SZ M [id number] FOR QUADRA HIP SYS BIOLOX DELT - [id number]  HEAD FEM SZ M [id number] FOR QUADRA HIP SYS BIOLOX DELT  [employer] [facility]-WD [id number] Left 1 Implanted         Drains: * No LDAs found *    Findings: Left hip osteoarthritis    Electronically signed by Osman Sky DO on 11/1/2022 at 9:04 AM

## 2022-11-01 NOTE — PROGRESS NOTES
Physical Therapy  Facility/Department: [facility] MED SURG  Physical Therapy Initial Assessment & Follow-up Session - Day of Surgery     Name: Kathya Perea  : 1965  MRN: [id number]  Date of Service: 2022  RN MJ reports patient is medically stable for therapy treatment this date. Chart reviewed prior to treatment and patient is agreeable for therapy. All lines intact and patient positioned comfortably at end of treatment. All patient needs addressed prior to ending therapy session. Discharge Recommendations:  Pt presenting with new musculoskeletal dysfunction and would benefit from additional therapy at time of discharge. Please refer to the AM-PAC score for current functional status. Patient would benefit from continued therapy after discharge     PT Equipment Recommendations  Equipment Needed: No (Pt reports owning a RW)      H&P / Procedure: L PAPO anterior approach 2022, Dr. Bowman Person     Patient Diagnosis(es): The encounter diagnosis was Post-op pain. Past Medical History:  has a past medical history of Arthritis, Heartburn, and Hyperlipidemia. Past Surgical History:  has a past surgical history that includes Hysterectomy; Ovary removal; Carpal tunnel release (Bilateral); bladder suspension; hernia repair; Colonoscopy; Endoscopy, colon, diagnostic; and Stomach surgery. Assessment   Body Structures, Functions, Activity Limitations Requiring Skilled Therapeutic Intervention: Decreased functional mobility ; Decreased ADL status; Decreased ROM; Decreased balance;Decreased sensation;Decreased endurance;Decreased safe awareness;Decreased strength;Decreased high-level IADLs; Increased pain  Assessment: Pt tolerated PT eval fair. Activity this session limited d/t nausea & vomiting. Pt able to stand demonstrating fair steadiness throughout. PT will return for second session this date to attempt further mobility prior to discharge.   Therapy Prognosis: Excellent  Decision Making: Medium Complexity  Requires PT Follow-Up: Yes  Activity Tolerance  Activity Tolerance: Other (comment)  Activity Tolerance Comments: Activity ceased d/t emesis, RN aware. PT will return for second session to attempt further mobility later this date. Plan   Physcial Therapy Plan  General Plan: 2 times a day 7 days a week (ORTHO Rene Garza)  Current Treatment Recommendations: Strengthening, ROM, Balance training, Functional mobility training, Transfer training, Neuromuscular re-education, Stair training, Gait training, Endurance training, Pain management, Home exercise program, Safety education & training, Patient/Caregiver education & training, Equipment evaluation, education, & procurement, Therapeutic activities  Safety Devices  Type of Devices: Bed alarm in place, Call light within reach, Left in bed, Gait belt, Nurse notified  Restraints  Restraints Initially in Place: No     Restrictions  Restrictions/Precautions  Restrictions/Precautions: General Precautions, Fall Risk, Weight Bearing  Required Braces or Orthoses?: No  Lower Extremity Weight Bearing Restrictions  Left Lower Extremity Weight Bearing: Weight Bearing As Tolerated  Position Activity Restriction  Hip Precautions: Anterior hip precautions (NO SLR)  Other position/activity restrictions: Up w/ assist, B thigh FELICITA hose, RUE IV, L PAPO - anterior approach     Subjective   General  Patient assessed for rehabilitation services?: Yes  Response To Previous Treatment: Not applicable  Family / Caregiver Present: Yes (Pt's daughter present at bedside throughout session)  Follows Commands: Within Functional Limits  General Comment  Comments: RN and pt agreeable to therapy. Pt supine in bed upon arrival.  Pt lethargic but cooperative throughout. Subjective  Subjective: Pt reporting feeling pain in her LLE, unable to describe. States \"How can I have this much pain if it's numb? \"  RN aware, states she gave pt meds prior to session.          Social/Functional History  Social/Functional History  Lives With: Daughter  Type of Home: House  Home Layout: Two level, Able to Live on Main level with bedroom/bathroom (bedroom is in the basement but pt plans to stay on the main floor upon discharge)  Home Access: Stairs to enter with rails  Entrance Stairs - Number of Steps: 5-7  Entrance Stairs - Rails: Both  Bathroom Shower/Tub: Tub/Shower unit  Bathroom Toilet: Standard  Bathroom Equipment: Shower chair  Home Equipment: Daphne Sat, rolling, Rollator  Has the patient had two or more falls in the past year or any fall with injury in the past year?: No (Pt's daughter reports pt fell one time last winter when she slipped on ice)  Receives Help From:  (Pts daughter will be home 24/7 for at least a week)  ADL Assistance: Independent  Homemaking Assistance: Independent (Pt reports her daughter does the majority of chores)  Homemaking Responsibilities: Yes  Ambulation Assistance: Independent (No AD use prior)  Transfer Assistance: Independent  Active : Yes (Pt can drive but typically her daughter drives)  Occupation: Retired  Type of Occupation: \"a little bit of everything\"  Leisure & Hobbies: grandchildren and dogs  Vision/Hearing  Vision  Vision: Impaired (Pt denies any recent visual changes)  Vision Exceptions: Wears glasses at all times  Hearing  Hearing: Within functional limits    Cognition   Orientation  Overall Orientation Status: Within Functional Limits  Cognition  Overall Cognitive Status: Exceptions  Arousal/Alertness: Appropriate responses to stimuli  Following Commands:  Follows multistep commands with repitition  Attention Span: Appears intact  Memory: Decreased long term memory  Safety Judgement: Decreased awareness of need for assistance;Decreased awareness of need for safety  Problem Solving: Decreased awareness of errors;Assistance required to identify errors made;Assistance required to correct errors made  Insights: Fully aware of deficits  Initiation: Does not require cues  Sequencing: Requires cues for some     Objective   Observation/Palpation  Posture: Fair  Observation: L hip surgical dressing dry & intact, R FELICITA hose on upon arrival, L FELICITA hose donned prior to mobility  Gross Assessment  Sensation: Impaired (Pt w/ decreased light touch sensation to LLE knee proximally)     AROM RLE (degrees)  RLE AROM: WFL  PROM LLE (degrees)  LLE PROM:  (assessed supine via passive heelslide)  LLE General PROM: Hip flex 0-60 degrees  AROM LLE (degrees)  LLE AROM :  (assessed supine via active heelslide)  LLE General AROM: Hip flex 0-30 degrees  AROM RUE (degrees)  RUE AROM : WFL  AROM LUE (degrees)  LUE AROM : WFL  Strength RLE  Strength RLE: WFL  Comment: Grossly 4/5  Strength LLE  Comment: Quad set+, glute set+  Strength RUE  Comment: See OT assessment for detail  Strength LUE  Comment: See OT assessment for detail             Bed mobility  Supine to Sit: Moderate assistance;2 Person assistance  Sit to Supine: Moderate assistance;2 Person assistance  Scooting: Moderate assistance  Bed Mobility Comments: Pt w/ difficulty throughout bed mobility this date requiring assist for progression of BLE & trunk throughout. Pt educated on use of sheet as leg  in order to maintain L PAPO anterior precautions w/ fair return demo. Upon sitting at EOB, pt requiring min verbal cueing to scoot hips forward in order to place B feet on floor prior to initiating transfers w/ good return demo. Pt denying any dizziness/lightheadedness throughout position changes this date. Transfers  Sit to Stand: Minimal Assistance;2 Person Assistance  Stand to Sit: Minimal Assistance;2 Person Assistance  Comment: Pt demonstrating fair steadiness throughout STS transfers this date. Pt requiring mod verbal cueing for proper hand placement throughout transfers w/ RW w/ fair return demo. Pt standing w/ narrow DIANA requiring mod verbal cueing to correct w/ fair return demo.   Pt denying any dizziness/lightheadedness upon standing however reporting increased nausea and reporting \"I'm going to puke. \"  Pt assisted back to sitting EOB and pt began having episodes of emesis. RN arrived and aware. Once emesis ceased, pt assisted back to supine. Ambulation  Comments: Activity limited this session by emesis; PT will return for second session to progress mobility as appropriate. Balance  Posture: Fair  Sitting - Static: Good;-  Sitting - Dynamic: Fair;+  Standing - Static: Fair;-  Single Leg Stance R Le  Single Leg Stance L Le  Comments: Standing balance assessed w/ RW  Exercise Treatment: ankle pumps, quad sets, glute sets, heelslides PROM & AROM        AM-PAC Score  AM-PAC Inpatient Mobility Raw Score : 15 (22)  AM-PAC Inpatient T-Scale Score : 39.45 (22)  Mobility Inpatient CMS 0-100% Score: 57.7 (22)  Mobility Inpatient CMS G-Code Modifier : CK (22)          Functional Outcome Measure-   Single Leg Stance Test:  0 sec. (<5 sec.= fall risk)      Goals  Short Term Goals  Time Frame for Short Term Goals: 6 visits  Short Term Goal 1: Pt to demonstrate bed mobility using sheet as leg  to maintain L PAPO anterior precautions Herb  Short Term Goal 2: Pt to perform STS transfers w/ RW Herb  Short Term Goal 3: Pt to ambulate at least 100ft w/ RW Herb  Short Term Goal 4: Pt to ascend/descend 6 stairs w/ handrails SBA  Short Term Goal 5: Pt to be indep w/ PAPO HEP  Patient Goals   Patient Goals : To feel better, to walk, to go home       Education  Patient Education  Education Given To: Patient  Education Provided: Role of Therapy;Plan of Care;Precautions; Energy Conservation;Transfer Training;Equipment; Fall Prevention Strategies  Education Provided Comments: Pt educated on: purpose of acute PT eval, importance of continued mobility throughout admission, general safety awareness, safe transfers w/ RW, pursed lip breathing, PAPO anterior hip precautions, use of sheet as leg , use of FELICITA hose, paced breathing control for pain control, PAPO supine HEP, activity tolerance, and PT POC. Pt w/ fair return demo. Pt would benefit from continued reinforcement of education. Education Method: Demonstration;Verbal  Education Outcome: Verbalized understanding;Continued education needed      Therapy Time   First Session Second Session  Group Co-treatment   Time In 1607  7348       Time Out 4994  2538       Minutes 33  46       Total Time: 79 minutes   Treatment time: 76 minutes     Co-treatment with OT warranted first time up day of surgery. Cotx due to potential risk of decreased sensation, muscle control and proprioception from spinal epidural and/or regional block. Decreased safety and independence requiring 2 skilled therapy professionals to address individual discipline's goals. Ondina Flores, PT         Writer returned from [id number] for second session to attempt further mobility this date. Pt reporting feeling \"better\" upon arrival w/ daughter present at bedside throughout. Pt requiring ModA for sup>sit bed mobility this date for progression of LLE throughout. Pt w/ fair return of use of leg  to maintain L PAPO anterior precautions. Upon sitting at EOB initially denying any dizziness/lightheadedness throughout. Pt performed STS transfer requiring Wilver x 2 and pt only able to maintain standing for <30 seconds prior to reporting dizziness and initiating return to sitting. BP assessed once sitting and read 82/44 mmHg. Pt then assisted back to supine requiring Wilver x 2 in order for staff to get orthostatic BP readings. BP supine was 91/45 mmHg (HR 63bpm), sitting 81/42 mmHg (HR 68bpm), and standing 108/87 mmHg (HR 108bpm). Pt denying any dizziness/lightheadedness throughout position changes for orthostatics. Pt then performed her 3rd STS transfer requiring Wilver x 2.   While standing at EOB, pt performed lateral weight shifting and standing marches to assess quad control prior to initiating ambulation. Pt demonstrating unsteadiness throughout w/ 1 episode of significant L knee buckling requiring MaxA x 2 to maintain balance & upright position throughout. Pt then requiring another seated rest break d/t feeling \"hot\" and \"like my body is going limp\". After ~1-2 min rest, pt able to attempt 4th STS transfer and take 4 lateral steps at EOB requiring ModA x 2 throughout. Throughout steps at EOB, pt w/ another episode of significant L knee buckling requiring MaxA x 2 to maintain balance. Pt assisted back to supine and repositioned comfortably at end of session. VIVIAN MJ notified of pt's blood pressures and progress. Pt would benefit from continued skilled PT to address current ROM, strength, gait, and balance deficits in order to maximize independence w/ functional mobility and return to PLOF as able. Co-treatment with OT warranted first time up day of surgery. Cotx due to potential risk of decreased sensation, muscle control and proprioception from spinal epidural and/or regional block. Decreased safety and independence requiring 2 skilled therapy professionals to address individual discipline's goals.      Tha Gonzalez, PT

## 2022-11-01 NOTE — ANESTHESIA POSTPROCEDURE EVALUATION
Department of Anesthesiology  Postprocedure Note    Patient: Josselin Waldron  MRN: [id number]  YOB: 1965  Date of evaluation: 11/1/2022      Procedure Summary     Date: 11/01/22 Room / Location: [address] [place] [address] / [facility] - INPATIENT    Anesthesia Start: 0774 Anesthesia Stop: 6174    Procedure: LEFT HIP TOTAL ARTHROPLASTY ANTERIOR APPROACH - [facility] (Left: Hip) Diagnosis:       Arthritis of left hip      (Arthritis of left hip [M16.12])    Surgeons: Sherolyn Lesches, DO Responsible Provider: Jsaon Doty DO    Anesthesia Type: spinal ASA Status: 2          Anesthesia Type: No value filed.     Radha Phase I: Radha Score: 10    Radha Phase II:        Anesthesia Post Evaluation    Patient location during evaluation: PACU  Patient participation: complete - patient participated  Level of consciousness: awake and alert  Airway patency: patent  Nausea & Vomiting: no nausea and no vomiting  Complications: no  Cardiovascular status: hemodynamically stable  Respiratory status: acceptable  Hydration status: stable

## 2022-11-01 NOTE — PROGRESS NOTES
Occupational Therapy  Facility/Department: [facility] [[email or web address]] MED SURG  Rehabilitation Occupational Therapy Daily Treatment Note    Date: 22  Patient Name: Kathya Perea       Room: 2631/9122-64  MRN: [id number]  Account: [de-identified]   : 1965  (62 y.o.) Gender: female      RN ENRIQUE reports patient is medically stable for therapy treatment this date. Chart reviewed prior to treatment and patient is agreeable for therapy. All lines intact and patient positioned comfortably at end of treatment. All patient needs addressed prior to ending therapy session. Due to recent hospitalization and medical condition, pt would benefit from additional intermittent skilled therapy at time of discharge. Please refer to the AM-PAC score for current functional status. Past Medical History:  has a past medical history of Arthritis, Heartburn, and Hyperlipidemia. Past Surgical History:   has a past surgical history that includes Hysterectomy; Ovary removal; Carpal tunnel release (Bilateral); bladder suspension; hernia repair; Colonoscopy; Endoscopy, colon, diagnostic; Stomach surgery; and Total hip arthroplasty (Left, 2022). Restrictions  Restrictions/Precautions: General Precautions; Fall Risk;Weight Bearing  Hip Precautions: Anterior hip precautions (NO SLR)  Other position/activity restrictions: Up w/ assist, B thigh FELICITA hose, RUE IV, L PAPO - anterior approach  Left Lower Extremity Weight Bearing: Weight Bearing As Tolerated  Required Braces or Orthoses?: No    Subjective  Subjective: Pt resting in bed agreeable to therapy. \"I am feeling a lot better. \"  Restrictions/Precautions: General Precautions; Fall Risk;Weight Bearing             Objective    Vitals  BP Supine in bed 102/52 (MAP 63)  BP Seated EOB 83/49 (MAP 58)  Transferred to commode /55 (MAP 62) Pulse 77  Ambulation and transfer to recliner BP 91/46 (MAP 61) Pulse 76         Cognition  Overall Cognitive Status: Exceptions  Arousal/Alertness: Appropriate responses to stimuli  Following Commands: Follows multistep commands with repitition  Attention Span: Appears intact  Memory: Decreased long term memory  Safety Judgement: Decreased awareness of need for assistance;Decreased awareness of need for safety  Problem Solving: Decreased awareness of errors;Assistance required to identify errors made;Assistance required to correct errors made  Insights: Fully aware of deficits  Initiation: Does not require cues  Sequencing: Requires cues for some  Orientation  Overall Orientation Status: Within Functional Limits         ADL  Toileting  Assistance Level: Stand by assist;Set-up  Skilled Clinical Factors: Completed hygiene after urination while seated on BSC. No management of underwear or pants d/t only wearing hospital gown  Toilet Transfers  Technique: Stand pivot  Equipment: Beside commode  Additional Factors: Verbal cues;Cues for hand placement; With handrails  Assistance Level: Minimal assistance; Requires x 2 assistance          Functional Mobility  Device: Rolling walker  Assistance Level: Minimal assistance  Skilled Clinical Factors: Ambulated in room maintaining being up for ~2.5 min with Mod buckling in LLE throughout. After given cues on pushing quad back pt improved with decreased buckling. Mod vc's for RW safety, proper sequencing of steps, upright posture, pacing, pursed lip breathing, and assist with IV pole. Bed Mobility  Overall Assistance Level: Contact Guard Assist  Additional Factors: Head of bed raised; With handrails; Increased time to complete;Verbal cues  Supine to Sit  Assistance Level: Contact guard assist  Skilled Clinical Factors: CGA for safety and VC's for use of bed sheet to guide LLE OOB, pursed lip breathing, slow movement, use of bed rail, and scooting out fully to EOB. Scooting  Assistance Level: Contact guard assist  Skilled Clinical Factors: Scooting out fully to EOB  Transfers  Additional Factors: Hand placement cues; Verbal cues;Increased time to complete  Device: Walker  Sit to Stand  Assistance Level: Minimal assistance; Requires x 2 assistance  Skilled Clinical Factors: Completed stand pivot from EOB to [facility] and then ambulated in room then transferring back to recliner. Mod vc's for proper positioning, hand placement, slow/controlled sit/stand, upright posture, RW safety, and assist with IV pole. Stand to Sit  Assistance Level: Minimal assistance; Requires x 2 assistance  Stand Pivot  Assistance Level: Minimal assistance; Requires x 2 assistance         Assessment  Assessment  Assessment: Pt with varying low BPs throughout start of session but at end of session were reading JAYThe Beauty of Essence FashionsDign[facility] CenterMakeSpace and in addition pt was not symptomatic throughout session. Will return to check on pt after she finishes eating dinner to recheck BPs and assess if pt is appropriate to attempt stairs to declare if pt is appropriate for safe return home. Pt was edu on proper icing, pain mangement techniques for LB dressing, positioning, and given multiple VCs throughout session with good understanding from pt and daughter. Activity Tolerance: Patient tolerated treatment well; Other (comment) (Low BP though patient not feeling symptomatic, RN notifed.)  Discharge Recommendations: Patient would benefit from continued therapy after discharge  OT Equipment Recommendations  Equipment Needed: Yes  Mobility Devices: ADL Assistive Devices  ADL Assistive Devices: Long-handled Shoe Horn;Long-handled Sponge;Reacher;Sock-Aid Hard;Grab Bars - shower  Safety Devices  Safety Devices in place: Yes  Type of devices: Left in chair;Nurse notified;Call light within reach;Gait belt    Patient Education  Education  Education Given To: Patient; Family (pt's daughter)  Education Provided: Role of Therapy;Plan of Care;Safety;Precautions; Energy Conservation;Transfer Training;DME/Home Modifications; ADL Function;Mobility Training  Education Method: Verbal;Demonstration  Barriers to Learning: None  Education Outcome: Verbalized understanding    Access Code: [email or web address]  URL: [email or web address]  Date: 11/01/2022  Prepared by: Vanessa Hunter II    Patient Education  Total Hip Replacement Handout  Activity and Movement  Red Flags    Plan  Occupational Therapy Plan  Times Per Week: 4-5x/wk 1x/day as [place]  Current Treatment Recommendations: Balance training;Strengthening; Functional mobility training; Endurance training; Safety education & training;Equipment evaluation, education, & procurement;Self-Care / ADL; Home management training;Patient/Caregiver education & training    Goals  Patient Goals   Patient goals : To go home! Short Term Goals  Time Frame for Short Term Goals: By discharge, pt to demo  Short Term Goal 1: tolerance for reassesment of functional mobility when appropriate to add goal to POC. Short Term Goal 2: ADL transfers to Min A with use of AD as needed. Short Term Goal 3: UB ADLs to Set up and LB ADLs to Min A with use of AD/AE as needed. Short Term Goal 4: bed mobility to SBA with use of bedrails as needed. Short Term Goal 5: toileting to CGA with use of AD/grab bars as needed. Long Term Goals  Long Term Goal 1: Pt to be I with fall prevention education, EC/WS tech, recommendations for AE/discharge, FELICITA hose wear/purpose, and safe car transfers with use of handouts as needed. AM-PAC Score        [facility] Inpatient Daily Activity Raw Score: 16 (11/01/22 1711)  AM-PAC Inpatient ADL T-Scale Score : 35.96 (11/01/22 1711)  ADL Inpatient CMS 0-100% Score: 53.32 (11/01/22 1711)  ADL Inpatient CMS G-Code Modifier : CK (11/01/22 1711)    Staff re-entered room at [phone] to practice stairs. Initial BP while seated in recliner read 103/52 (61) pulse 88 and no symptoms. Pt with no symptoms throughout and able to complete stairs with CGA. Pt then ambulated back to room from therapy gym being up for 3-4 min with CGA-Min A with slight buckling in LLE.  Pt completed treatment w/o any incidents and as long as medically stable is safe to return home with assist from daughter. X2 staff present throughout to maximize safety d/t buckling in LLE and low BP. Therapy Time   Co-Treatment 2nd Treatment Group Co-treatment   Time In [id number]       Time Out 3063 1835       Minutes 45 38  =83 total             Co-treatment with PT warranted secondary to decreased safety and independence requiring 2 skilled therapy professionals to address individual discipline's goals. OT addressing preparation for ADL transfer, sitting balance for increased ADL performance, sitting/activity tolerance, functional reaching, environmental safety/scanning, fall prevention, functional mobility for ADL transfers, ability to sequence and follow directions, bed mobility tech, and functional UE strength.      Sarbjit ROBIN Briceño

## 2022-11-01 NOTE — PROGRESS NOTES
The patient arrived to the room from PACU; awake and alert, family member present. The patient was oriented to the room, call light, bed mechanics and safety.

## 2022-11-01 NOTE — PROGRESS NOTES
CLINICAL PHARMACY NOTE: MEDS TO BEDS    Total # of Prescriptions Filled: 3   The following medications were delivered to the patient:  Percocet 5-325  Stool softener 100mg  Aspirin 81mg    Additional Documentation:

## 2022-11-01 NOTE — PLAN OF CARE
Problem: Discharge Planning  Goal: Discharge to home or other facility with appropriate resources  Outcome: Not Progressing     Problem: Pain  Goal: Verbalizes/displays adequate comfort level or baseline comfort level  Outcome: Not Progressing     Problem: Safety - Adult  Goal: Free from fall injury  Outcome: Not Progressing     Problem: Skin/Tissue Integrity  Goal: Absence of new skin breakdown  Description: 1. Monitor for areas of redness and/or skin breakdown  2. Assess vascular access sites hourly  3. Every 4-6 hours minimum:  Change oxygen saturation probe site  4. Every 4-6 hours:  If on nasal continuous positive airway pressure, respiratory therapy assess nares and determine need for appliance change or resting period. Outcome: Not Progressing     Problem: Chronic Conditions and Co-morbidities  Goal: Patient's chronic conditions and co-morbidity symptoms are monitored and maintained or improved  Outcome: Not Progressing     Problem: Discharge Planning  Goal: Discharge to home or other facility with appropriate resources  Outcome: Not Progressing     Problem: Pain  Goal: Verbalizes/displays adequate comfort level or baseline comfort level  Outcome: Not Progressing     Problem: Safety - Adult  Goal: Free from fall injury  Outcome: Not Progressing     Problem: Skin/Tissue Integrity  Goal: Absence of new skin breakdown  Description: 1. Monitor for areas of redness and/or skin breakdown  2. Assess vascular access sites hourly  3. Every 4-6 hours minimum:  Change oxygen saturation probe site  4. Every 4-6 hours:  If on nasal continuous positive airway pressure, respiratory therapy assess nares and determine need for appliance change or resting period.   Outcome: Not Progressing     Problem: Chronic Conditions and Co-morbidities  Goal: Patient's chronic conditions and co-morbidity symptoms are monitored and maintained or improved  Outcome: Not Progressing

## 2022-11-01 NOTE — PROGRESS NOTES
Orthopedic Coordinator Note    Patient s/p left total Hip replacement on 11/01/2022 WITH DR. Coty Farley. The following appointments are currently scheduled:    Post-op with surgeon 11/14/2022 AT 1030 WITH NIYAH. Physical Therapy OPPT [facility] 11/04/2022 AT 1030. Wheeled walker order is not entered  Face to face documentation is N/A, PT HAS A WALKER SHE WILL BRING TO HOSP. DVT Prophylaxis: 81MG EC ASA BID FOR 6 WEEKS.       Any questions please contact Moody Cardona RN, MSN  [phone]      Electronically signed by: Moody Cardona RN on 11/1/2022 at 8:56 AM

## 2022-11-01 NOTE — H&P
Interval H&P Note    Pt Name: Higinio Jean  MRN: [id number]  YOB: 1965  Date of evaluation: 11/1/2022      [x] I have reviewed epic the orthopedic note  by Beronica Snyder pa-c dated 10/3/22 attatched below for an Interval History and Physical note. [x] I have examined  Higinio Jean  There are no changes to the patient who is scheduled for LEFT HIP TOTAL ARTHROPLASTY ANTERIOR APPROACH - [facility] by Christin Andrade DO for Arthritis of left hip [M16.12]. The patient denies new health changes, fever, chills, wheezing, cough, increased SOB, chest pain, open sores or wounds. no diabetes or blood thinning medication. LAST ALEVE >ONE WEEK no DM    Preoperative medical clearance by Dr Donnis Leventhal on 10/17/22 refer to epic    Vital signs: /81   Pulse 80   Temp 97.5 °F (36.4 °C)   Resp 18   Ht 5' 3\" (1.6 m)   Wt 166 lb 4.8 oz (75.4 kg)   SpO2 99%   BMI 29.46 kg/m²     Allergies:  Penicillins    Medications:    Prior to Admission medications    Medication Sig Start Date End Date Taking? Authorizing Provider   cyclobenzaprine (FLEXERIL) 5 MG tablet Take 5 mg by mouth 3 times daily as needed for Muscle spasms    Historical Provider, MD   GABAPENTIN PO Take 1 tablet by mouth at bedtime    Historical Provider, MD   phentermine 37.5 MG capsule TAKE 1 CAPSULE BY MOUTH EVERY DAY IN THE MORNING 10/17/22 11/17/22  Gia Gutierrez MD   omeprazole (PRILOSEC) 20 MG delayed release capsule TAKE 1 CAPSULE BY MOUTH EVERY DAY 10/17/22   Gia Gutierrez MD   atorvastatin (LIPITOR) 20 MG tablet TAKE 1 TABLET BY MOUTH EVERY DAY 10/17/22   Gia Gutierrez MD   Handicap Placard MISC by Does not apply route Will be valid for 5 year 4/13/22   Torres Ramos MD         This is a 62 y.o. female who is pleasant, cooperative, alert and oriented x3, in no acute distress. Heart: Heart sounds are normal.  HR 80 regular rate and rhythm without murmur, gallop or rub.    Lungs: Normal respiratory effort with equal expansion, good air exchange, unlabored and clear to auscultation without wheezes or rales bilaterally   Abdomen: soft, nontender, nondistended with bowel sounds . Extremities warm dry pulses intact no calf tenderness or peripheral  edema    Labs:  Recent Labs     10/07/22  1100   HGB 13.9   HCT 42.7   WBC 6.4   MCV 88.4         K 4.1      CO2 27   BUN 17   CREATININE 0.67   GLUCOSE 84   AST 19   ALT 14   LABALBU 4.1       No results for input(s): COVID19 in the last 720 hours. Benjamín Cowart, KOBY - CNP  Electronically signed 11/1/2022 at 6:05 AM         Beronica Holt PA-C   Physician Assistant   Specialty:  Physician Assistant   Progress Notes      Signed   Encounter Date:  10/3/2022          Related encounter: Office Visit from 10/3/2022 in [facility].  [address] [place] [id number]  Dept: [phone]     Ambulatory Orthopedic Consult        CHIEF COMPLAINT:         Chief Complaint   Patient presents with    New Patient       new patient,  Left hip pain, no surg, xrays & MRI in Epic, ref'd by Richar Brock, scheduled w/pt   screened green         HISTORY OF PRESENT ILLNESS:       The patient is a 62 y.o. female who is being seen at the request of  Rajat Herzog MD for consultation and evaluation of  chronic left hip pain. Mariah Goldman presents with a 1 years history of pain in the left hip. The pain does radiate into the thigh and into the groin. The pain is   made worse with weightbearing. The pain is  worse at night. The pain is not worse when laying on the affected side. The pain is made better with sitting/rest. She notes she has tried chiropractic treatment, left lateral hip injections and OTC anti-inflammatories - all of which have not helped her left hip discomfort. Patient's current BMI is 30.73 (168 pounds). She is a nondiabetic. And a non-smoker. Past Medical History:    Past Medical History   History reviewed. No pertinent past medical history. Past Surgical History:    Past Surgical History         Past Surgical History:   Procedure Laterality Date    HYSTERECTOMY (CERVIX STATUS UNKNOWN)        OVARY REMOVAL                Current Medications:   Current Facility-Administered Medications          Current Outpatient Medications   Medication Sig Dispense Refill    phentermine 37.5 MG capsule TAKE 1 CAPSULE BY MOUTH EVERY DAY IN THE MORNING 30 capsule 0    omeprazole (PRILOSEC) 10 MG delayed release capsule Take 2 capsules by mouth daily 30 capsule 3    atorvastatin (LIPITOR) 10 MG tablet TAKE 1 TABLET BY MOUTH EVERY DAY (Patient not taking: Reported on 2022) 30 tablet 3    Handicap Placard MISC by Does not apply route Will be valid for 5 year 1 each 0      No current facility-administered medications for this visit.             Allergies:    Penicillins     Social History:   Social History               Socioeconomic History    Marital status: Single       Spouse name: Not on file    Number of children: Not on file    Years of education: Not on file    Highest education level: Not on file   Occupational History    Not on file   Tobacco Use    Smoking status: Former       Packs/day: 1.00       Years: 23.00       Pack years: 23.00       Types: Cigarettes       Quit date: 2000       Years since quittin.7    Smokeless tobacco: Never   Vaping Use    Vaping Use: Former    Substances: Nicotine    Devices: Pre-filled or refillable cartridge   Substance and Sexual Activity    Alcohol use: Yes       Comment: socially    Drug use: Not Currently       Types: Marijuana [place])    Sexual activity: Not Currently   Other Topics Concern    Not on file   Social History Narrative    Not on file      Social Determinants of Health          Financial Resource Strain: Low Risk     Difficulty of Paying Living Expenses: Not hard at all   Food Insecurity: No Food Insecurity    Worried About Running Out of Food in the Last Year: Never true    Ran Out of Food in the Last Year: Never true   Transportation Needs: Not on file   Physical Activity: Not on file   Stress: Not on file   Social Connections: Not on file   Intimate Partner Violence: Not on file   Housing Stability: Not on file            Family History:  Family History         Family History   Problem Relation Age of Onset    Asthma Mother      Other Mother           COPD    Cancer Father           Lung    Substance Abuse Sister           Clean for a year now    Obesity Sister      No Known Problems Brother      No Known Problems Brother                 REVIEW OF SYSTEMS:  Review of Systems   Constitutional:  Negative for activity change and fever. HENT:  Negative for sneezing. Respiratory:  Negative for cough and shortness of breath. Cardiovascular:  Negative for chest pain. Gastrointestinal:  Negative for vomiting. Musculoskeletal:  Positive for arthralgias (left hip). Negative for joint swelling and myalgias. Skin:  Negative for color change. Neurological:  Negative for weakness and numbness. Psychiatric/Behavioral:  Positive for sleep disturbance (d/t left hip pain). PHYSICAL EXAM:  Ht 5' 2\" (1.575 m)   Wt 168 lb (76.2 kg)   BMI 30.73 kg/m²  Body mass index is 30.73 kg/m². Physical Exam  Gen: alert and oriented to person and place. Psych:  Appropriate affect; Appropriate knowledge base; Appropriate mood; No hallucinations; Head: normocephalic, atraumatic   Chest: symmetric chest excursion; nonlabored respiratory effort. Pelvis: stable; no obvious pelvis deformity  Ortho Exam  Extremity:  Patient ambulates independently with mild shortening weightbearing phase and antalgic gait to the Left lower extremity. There is no erythema, warmth, skin lesions, signs of infection. Positive hip logroll and Stinchfield test is noted.   Patient has full range of motion of the Left knee and ankle.  Motor, sensory, and vascular examination to the Left lower extremity is intact without focal deficits. Patient has full range of motion of the lumbosacral spine. Radiology:   XR HIP 2-3 VW W PELVIS LEFT     Result Date: 10/3/2022  History:   Left hip pain Findings:   Low AP pelvis, AP Left hip, frog leg lateral xrays of the Left hip done in the office today shows moderate joint space narrowing, osteophytosis, joint line sclerosis to the Left hip. No evidence of fracture, subluxation, dislocation, radioopaque foreign body or radioopaque tumor is noted. Impression:  Moderate degenerative changes Left hip as described above. ASSESSMENT:  1. Arthritis of left hip       PLAN:     Today in office we discussed etiology and natural history of chronic left hip pain due to osteoarthritis. I personally interpreted and reviewed the patient's x-rays from today revealing moderate approaching severe degenerative changes within the left hip. The treatment options may include activity modification, oral anti-inflammatories, bracing, injections, advanced imaging, physical therapy and/or surgical intervention. The patient would like to proceed with:  1.  Scheduling a left total hip arthroplasty with the anterior approach to be completed by Dr. Johanna Hernandez DO. I spoke with the patient about the benefits and risks associated with a left total hip arthroplasty with the anterior approach, answered her questions and spoke to her about post-operative care and rehabilitation. The patient signed the surgical consent form in office today. We discussed that our surgery scheduler will call her in regards to pre-operative clearance/testing, surgery date and post-operative follow up. The patient will follow up 2 weeks post-operatively. We discussed that the patient should call us with any questions or concerns. The patient voiced her understanding. Return for 2 weeks post-op.      Total Time: 40 min        Encounter Medications    No orders of the defined types were placed in this encounter. No orders of the defined types were placed in this encounter. This note is created with the assistance of a speech recognition program.  While intending to generate a document that actually reflects the content of the visit, the document can still have some errors including those of syntax and sound a like substitutions which may escape proof reading. In such instances, actual meaning can be extrapolated by contextual diversion.       Electronically signed by Ulises Hua PA-C on 10/5/2022 at 6:58 PM

## 2022-11-01 NOTE — ANESTHESIA PROCEDURE NOTES
Peripheral Block    Patient location during procedure: pre-op  Reason for block: post-op pain management and at surgeon's request  Start time: 11/1/2022 7:04 AM  End time: 11/1/2022 7:10 AM  Staffing  Performed: anesthesiologist   Anesthesiologist: Yadira Anne DO  Preanesthetic Checklist  Completed: patient identified, IV checked, site marked, risks and benefits discussed, surgical/procedural consents, equipment checked, pre-op evaluation, timeout performed, anesthesia consent given, oxygen available and monitors applied/VS acknowledged  Peripheral Block   Patient position: supine  Prep: ChloraPrep  Provider prep: mask and sterile gloves  Patient monitoring: cardiac monitor, continuous pulse ox, frequent blood pressure checks and IV access  Block type: Fascia iliaca  Laterality: left  Injection technique: single-shot  Guidance: ultrasound guided  Local infiltration: lidocaine  Infiltration strength: 1 %  Local infiltration: lidocaine  Dose: 3 mL    Needle   Needle type: insulated echogenic nerve stimulator needle   Needle gauge: 21 G  Needle localization: ultrasound guidance  Needle length: 10 cm  Assessment   Injection assessment: negative aspiration for heme, no paresthesia on injection and local visualized surrounding nerve on ultrasound  Paresthesia pain: none  Slow fractionated injection: yes  Hemodynamics: stable  Outcomes: uncomplicated    Additional Notes  Left FI single shot   30ml 0.2% ropivacaine

## 2022-11-01 NOTE — PROGRESS NOTES
Physical Therapy  Facility/Department: [facility]  Daily Treatment Note  NAME: Chaitanya Gillis  : 1965  MRN: [id number]    Date of Service: 2022    Discharge Recommendations:  Patient would benefit from continued therapy after discharge   PT Equipment Recommendations  Equipment Needed: No (Patient reports she owes personal RW)    Patient Diagnosis(es): The encounter diagnosis was Post-op pain. Assessment   Assessment: Patient progressing toward STGs but limited by low BP during AM and current treatment. Will return after patient eats lunch and reassess BP, see if patient is appropraite for stair training. Activity Tolerance: Other (comment) (Low BP though patient not feeling symptomatic, RN notifed.)  Equipment Needed: No (Patient reports she owes personal RW)     Plan    Physcial Therapy Plan  General Plan: 2 times a day 7 days a week  Current Treatment Recommendations: Strengthening;ROM;Balance training;Functional mobility training;Transfer training;Neuromuscular re-education;Stair training;Gait training; Endurance training;Pain management;Home exercise program;Safety education & training;Patient/Caregiver education & training;Equipment evaluation, education, & procurement; Therapeutic activities     Restrictions  Restrictions/Precautions  Restrictions/Precautions: General Precautions, Fall Risk, Weight Bearing  Required Braces or Orthoses?: No  Lower Extremity Weight Bearing Restrictions  Left Lower Extremity Weight Bearing: Weight Bearing As Tolerated  Position Activity Restriction  Hip Precautions: Anterior hip precautions (NO SLR)  Other position/activity restrictions: Up w/ assist, B thigh FELICITA hose, RUE IV, L PAPO - anterior approach     Subjective    Subjective  Subjective: Patient is agreeable for PT treatment. BP low during eval and reassessment with PT and OT earlier.   Orientation  Overall Orientation Status: Within Functional Limits  Cognition  Overall Cognitive Status: Exceptions  Arousal/Alertness: Appropriate responses to stimuli  Following Commands: Follows multistep commands with repitition  Attention Span: Appears intact  Memory: Decreased long term memory  Safety Judgement: Decreased awareness of need for assistance;Decreased awareness of need for safety  Problem Solving: Decreased awareness of errors;Assistance required to identify errors made;Assistance required to correct errors made  Insights: Fully aware of deficits  Initiation: Does not require cues  Sequencing: Requires cues for some     Objective   Vitals  BP Supine in bed 102/52 (MAP 63)  BP Seated EoB 83/49 (MAP 58)  Transferred to commode /55 (MAP 62) Pulse 77  Ambulation and transfer to recliner BP 91/46 (MAP 61) Pulse 76  Bed Mobility Training  Bed Mobility Training: Yes  Overall Level of Assistance: Contact-guard assistance  Interventions: Safety awareness training; Tactile cues; Verbal cues   Rolling: Contact-guard assistance  Supine to Sit: Contact-guard assistance  Scooting: Contact-guard assistance  Comment: Good use of bed rails and HOB elevated. Use of sheet as LT leg  with good tech and safety. VCs for slow progression to EOB d.t previously low BP symptoms earlier. Patient at EOB x 5 mins for acclimation and assessing symptoms. Balance  Sitting: Intact  Standing: With support (w/ RW and Min x 1 A)  Transfer Training  Transfer Training: Yes  Overall Level of Assistance: Minimum assistance;Assist X2  Interventions: Safety awareness training; Tactile cues; Verbal cues  Sit to Stand: Minimum assistance;Assist X2  Stand to Sit: Minimum assistance;Assist X2  Stand Pivot Transfers: Minimum assistance;Assist X2  Bed to Chair: Minimum assistance;Assist X2 (Patient initally required 2 assist with transfer to commode, Lt LE buckling and safety with lines. Discussed performing quad set during stance phase to decresed buckling.  Patient able to apply tech during ambulation.)  Gait Training  Gait Training: Yes  Gait  Overall Level of Assistance: Minimum assistance  Interventions: Safety awareness training; Tactile cues; Verbal cues  Speed/Maia: Slow  Step Length: Left shortened  Swing Pattern: Left asymmetrical  Gait Abnormalities:  (Initally Lt LE buckling. Given VCs for TKE duirng stance phase to promoted quad engagment to decrease buckling. Patient able to progress to Min x 1 A but has to really think about keeping TKE and patient performing throughout gait pattern.)  Distance (ft):  (20' x 1)  Assistive Device: Walker, rolling;Gait belt  Neuromuscular Education  Neuromuscular Education: Yes  Treatment: Gait ;Standing;Sitting  Neuromuscular Comments: VCs req for proper breathing kar (pursed lip breathing) during functional mobility. Tactile and VCs req for postural control during sit<>stands & amb to promote abdominal and erector spinae mm facilitation for increased stability and balance, decreasing kyphosis of the spine. Pt req VCs to correct for forward WS with squatting in addition to pressing firmly into ground with feet, to promote the appropriate body mechanics for sit<>stand transfers. PT Exercises  Exercise Treatment: ankle pumps, quad sets, glute sets, heelslides PROM & AROM  A/AROM Exercises: Seated sheridan LE AROM x 10 reps with no rest breaks needed. Patient reclined in chair, therefore instructed to perfrom QS, GS and AP as tolerated to decreased buckling. Safety Devices  Type of Devices: Call light within reach;Gait belt;Nurse notified; All fall risk precautions in place; Left in chair  Restraints  Restraints Initially in Place: No       Goals  Short Term Goals  Time Frame for Short Term Goals: 6 visits  Short Term Goal 1: Pt to demonstrate bed mobility using sheet as leg  to maintain L PAPO anterior precautions Herb  Short Term Goal 2: Pt to perform STS transfers w/ RW Herb  Short Term Goal 3: Pt to ambulate at least 100ft w/ RW Herb  Short Term Goal 4: Pt to ascend/descend 6 stairs w/ handrails SBA  Short Term Goal 5: Pt to be indep w/ PAPO HEP  Patient Goals   Patient Goals : To feel better, to walk, to go home    Education  Patient Education  Education Given To: Patient  Education Provided: Role of Therapy;Plan of Care;Precautions; Energy Conservation;Transfer Training;Equipment; Fall Prevention Strategies  Education Provided Comments: Pt educated on: purpose of acute PT treatment, importance of continued mobility throughout admission, general safety awareness, safe transfers w/ RW, pursed lip breathing, PAPO anterior hip precautions, use of sheet as leg , use of FELICITA hose, paced breathing control for pain control, PAPO supine HEP, activity tolerance, and PT POC. Pt w/ fair return demo. Pt would benefit from continued reinforcement of education. Education Method: Demonstration;Verbal  Education Outcome: Verbalized understanding;Continued education needed    Therapy Time   Individual Concurrent 1st treatment Return for stair training and gait   Time In    [phone]   Time Out    [id number]  [id number]   [id number]    00 28       Returned for 2nd attempt to reassess BP. Patient /52 (MAP 61) Pulse 88, patient not feeling symptomatic and agreeable to attempt stairs. STS from recliner CGA x 2, 10' x 1 with RW CGA x 2 A for lines and safety for possible buckling to W/C not buckling noted but patient keeping Lt knee continuously in TKE throughout gait and reporting she has to think about keeping it straight. Patient taken to PT gym for steps. Patient able to verbalized good understanding of stair training and demo'd good stair training x 5 steps with sheridan railings with daughter present to observe. Patient able to walk back to room from gym with W/C follow CGA x 2 A when keeping Lt knee straight. Last few feet to recliner writer had patient attempt general normal gait pattern (such as flex knee during swing phase and TKE during stance phase).  Patient initially able to perform pattern okay, but then buckled on last 2 steps needed Mod A to correct. Dicussed maintain TKE during gait until patient feels her knee is supporting her and be prepared with UB support on RW. Gemma Larson PTA   Co-treatment with OT warranted secondary to decreased safety and independence requiring 2 skilled therapy professionals to address individual discipline's goals. PT addressing pre gait trunk strengthening, weight shifting prior to transfers, transfer training, and postural control in sitting, buckling during gait, Assessing BP issues.

## 2022-11-01 NOTE — PROGRESS NOTES
Occupational Therapy  Facility/Department: [facility] MED SURG  Occupational Therapy Initial Assessment    Name: Chaitanya Gillis  : 1965  MRN: [id number]  Date of Service: 2022    RN ENRIQUE reports patient is medically stable for therapy treatment this date. Chart reviewed prior to treatment and patient is agreeable for therapy. All lines intact and patient positioned comfortably at end of treatment. All patient needs addressed prior to ending therapy session. Discharge Recommendations:  Patient would benefit from continued therapy after discharge  Due to recent hospitalization and medical condition, pt would benefit from additional intermittent skilled therapy at time of discharge. Please refer to the AM-PAC score for current functional status. OT Equipment Recommendations  Equipment Needed: Yes  Mobility Devices: ADL Assistive Devices  ADL Assistive Devices: Long-handled Shoe Horn;Long-handled Sponge;Reacher;Sock-Aid Hard;Grab Bars - shower       Patient Diagnosis(es): The encounter diagnosis was Post-op pain. Past Medical History:  has a past medical history of Arthritis, Heartburn, and Hyperlipidemia. Past Surgical History:  has a past surgical history that includes Hysterectomy; Ovary removal; Carpal tunnel release (Bilateral); bladder suspension; hernia repair; Colonoscopy; Endoscopy, colon, diagnostic; Stomach surgery; and Total hip arthroplasty (Left, 2022). PER H&P: L PAPO- Dr. Sofia Caballero      Assessment   Performance deficits / Impairments: Decreased functional mobility ; Decreased ADL status; Decreased safe awareness;Decreased balance;Decreased posture;Decreased cognition;Decreased endurance;Decreased high-level IADLs;Decreased strength  Assessment: Pt would benefit from continued skilled OT services to increase I and safety during functional tasks to return home at prior level of function as able.   Prognosis: Good  Decision Making: Medium Complexity  Activity Tolerance  Activity Tolerance: Patient Tolerated treatment well  Activity Tolerance Comments: fair +        Plan   Occupational Therapy Plan  Times Per Week: 4-5x/wk 1x/day as lavonne  Current Treatment Recommendations: Balance training, Strengthening, Functional mobility training, Endurance training, Safety education & training, Equipment evaluation, education, & procurement, Self-Care / ADL, Home management training, Patient/Caregiver education & training     Restrictions  Restrictions/Precautions  Restrictions/Precautions: General Precautions, Fall Risk, Weight Bearing  Required Braces or Orthoses?: No  Lower Extremity Weight Bearing Restrictions  Left Lower Extremity Weight Bearing: Weight Bearing As Tolerated  Position Activity Restriction  Hip Precautions: Anterior hip precautions (NO SLR)  Other position/activity restrictions: Up w/ assist, B thigh FELICITA hose, MESFINE IV, L PAPO - anterior approach    Subjective   General  Chart Reviewed: Yes  Patient assessed for rehabilitation services?: Yes  Family / Caregiver Present: Yes (Pts daughter in room)  Subjective  Subjective: Pt resting in bed, reporting surgical hip feels \"terrible\". Pt and family reporting pt has been feeling very nauseous. RN aware.      Social/Functional History  Social/Functional History  Lives With: Daughter  Type of Home: House  Home Layout: Two level, Able to Live on Main level with bedroom/bathroom (bedroom is in the basement but pt plans to stay on the main floor upon discharge)  Home Access: Stairs to enter with rails  Entrance Stairs - Number of Steps: 5-7  Entrance Stairs - Rails: Both  Bathroom Shower/Tub: Tub/Shower unit  Bathroom Toilet: Standard  Bathroom Equipment: Shower chair  Home Equipment: Phyliss Man, rolling, Rollator  Has the patient had two or more falls in the past year or any fall with injury in the past year?: No (Pt's daughter reports pt fell one time last winter when she slipped on ice)  Receives Help From:  (Pts daughter will be home 24/7 for at least a week)  ADL Assistance: Independent  Homemaking Assistance: Independent (Pt reports her daughter does the majority of chores)  Homemaking Responsibilities: Yes  Ambulation Assistance: Independent (No AD use prior)  Transfer Assistance: Independent  Active : Yes (Pt can drive but typically her daughter drives)  Occupation: Retired  Type of Occupation: \"a little bit of everything\"  [address] [facility]: grandchildren and dogs       Objective   Observation/Palpation  Posture: Fair  Observation: L hip surgical dressing dry & intact, R FELICITA hose on upon arrival, L FELICITA hose donned prior to mobility  Safety Devices  Type of Devices: Bed alarm in place;Call light within reach; Left in bed;Gait belt;Nurse notified  Restraints  Restraints Initially in Place: No      Balance  Sitting:  (SBA)  Standing:  (Min x 2 with RW)  Functional Mobility   Overall Level of Assistance:  (Not safe or appropriate to attempts steps at this time. After standing pt became nauseous, assisted back to sitting on EOB. Pt had an episode of emesis, RN in room and aware.)     AROM: Within functional limits  PROM: Within functional limits  Strength: Generally decreased, functional (B UE ~ 4/5)  Coordination: Within functional limits  Tone: Normal  Sensation: Intact (N/T in L thigh)      ADL  Feeding: Setup  Grooming: Setup;Stand by assistance (seated)  UE Bathing: Setup;Minimal assistance  LE Bathing: Setup; Moderate assistance  UE Dressing: Setup;Minimal assistance (to tie/adjust hosp gown while seated on EOB)  LE Dressing: Setup;Maximum assistance (Pt required Max assist for donning L FELICITA hose while supine in bed, pt able to assist pulling them up fully once past knee.)  Toileting:  Moderate assistance;Maximum assistance  Additional Comments: Pt educated on safe ADL completion stratigies including FELICITA hose wear/purpose, sitting vs standing, use of AE, EC/WS tech, dressing surgical LE first. Pt verbalized good understanding of all education provided. Activity Tolerance  Activity Tolerance: Other (comment)  Activity Tolerance Comments: Activity ceased d/t emesis, RN aware. PT will return for second session to attempt further mobility later this date. Bed mobility  Supine to Sit: Moderate assistance;2 Person assistance  Sit to Supine: Moderate assistance;2 Person assistance  Scooting: Moderate assistance  Bed Mobility Comments: Pt completed bed mobility with signifiant difficulties this date. Pt educated on use of sheet as leg  to assist in maintain L PAPO precautions. Mod verbal cues for pacing self, upright posture, use of bedrails, proper bed mobility tech and pursed lip breathing tech with use of handouts as needed. Upon sitting on EOB, pt reporting feeling slightly dizzy and nauseous. Symptoms subsided after ~ 2-3 min. Transfers  Sit to stand: Minimal assistance;2 Person assistance  Stand to sit: Minimal assistance;2 Person assistance  Transfer Comments: Pt given Mod verbal cues for pacing self, upright posture, RW safety, controlled stand to sit, squaring self/AD up to surface and reaching back to surface prior to sitting all to increase safety. Vision  Vision: Impaired (Pt denies any recent visual changes)  Vision Exceptions: Wears glasses at all times  Hearing  Hearing: Within functional limits      Cognition  Overall Cognitive Status: Exceptions  Arousal/Alertness: Appropriate responses to stimuli  Following Commands:  Follows multistep commands with repitition  Attention Span: Appears intact  Memory: Decreased long term memory  Safety Judgement: Decreased awareness of need for assistance;Decreased awareness of need for safety  Problem Solving: Decreased awareness of errors;Assistance required to identify errors made;Assistance required to correct errors made  Insights: Fully aware of deficits  Initiation: Does not require cues  Sequencing: Requires cues for some  Orientation  Overall Orientation Status: Within Functional Limits                  Education Given To: Patient  Education Provided: Role of Therapy;Transfer Training;Plan of Care;Energy Conservation; Fall Prevention Strategies; ADL Adaptive Strategies  Education Provided Comments: safety in function, benefits of being OOB, pursed lip breathing tech, recommendations for continued therapy, OT POC, role of OT in acute care, fall prevention/call light use, FELICITA hose wear/purpose  Education Method: Verbal  Barriers to Learning: None  Education Outcome: Verbalized understanding                       AM-PAC Score        AM-PAC Inpatient Daily Activity Raw Score: 16 (11/01/22 [facility])  AM-PAC Inpatient ADL T-Scale Score : 35.96 (11/01/22 1351)  ADL Inpatient CMS 0-100% Score: 53.32 (11/01/22 [facility])  ADL Inpatient CMS G-Code Modifier : CK (11/01/22 1351)         Goals  Short Term Goals  Time Frame for Short Term Goals: By discharge, pt to demo  Short Term Goal 1: functional mobility to CGA with use of AD as needed. Short Term Goal 2: ADL transfers to Min A with use of AD as needed. Short Term Goal 3: UB ADLs to Set up and LB ADLs to Min A with use of AD/AE as needed. Short Term Goal 4: bed mobility to SBA with use of bedrails as needed. Short Term Goal 5: toileting to CGA with use of AD/grab bars as needed. Long Term Goals  Long Term Goal 1: Pt to be I with fall prevention education, EC/WS tech, recommendations for AE/discharge, FELICITA hose wear/purpose, and safe car transfers with use of handouts as needed. Patient Goals   Patient goals : To go home! Co-treatment with PT warranted first time up day of surgery. Cotx due to potential risk of decreased sensation, muscle control and proprioception from spinal epidural and/or regional block. Decreased safety and independence requiring 2 skilled therapy professionals to address individual discipline's goals.        Therapy Time   Eval and first Tx session Reeval and second  tx session Group Co-treatment   Time In [id number] Time Out 1151 1449       Minutes 43 37        Total time 80 minutes  Tx time 68 min        Pt was seen for seconds session to assess functional mobility from 2869-0861. Pt resting in bed with daughter in room, reporting feeling \"better. \" Pt completed bed mobility with Mod A using sheet as leg  to maintain L PAPO Anterior precaution. Pt denied dizziness once seated on EOB. Pt stood with Min x 2 with RW. Pt reporting feeling hot, lightheaded and needing to sit down. BP assessed 82/44 mmHg HR 63. Symptoms subsided, pt assisted to supine back down into bed with Min x2 assist using sheet as leg . Ortho static BP taken in laying 91/45 mmHg HR 63. Pt assisted back into sitting with Mod A BP assessed Sitting 81/42 mmHg HR 68. Pt stood with Min x2 assist with RW BP taken in standing standing 108/87 mmHg . Pt reporting feeling better. Pt completed pre gait weight shifting, steps in place and 3-4 lateral steps towards HOB with Mod x 2 with RW. Pt given Mod verbal cues for pursed lip breathing, RW safety, extending B UE on RW for increased support, pacing self, weight shifting and upright posture. During functional mobility pt demo'd significant buckling in L LE twice requiring MAX assist to correct. Pt reporting feeling dizzy stating \"my body feels limp. \" Pt assisted back into sitting with Mod x2 assist. Pt completed sit to supine with Min A x2 using sheet as leg . Pt reporting symptoms subsided once laying down. Pts daughter in room, call light within reach, bed alarm on and VIVIAN NUNEZ notified.     Paul Cisneros, OT

## 2022-11-01 NOTE — ANESTHESIA PRE PROCEDURE
Department of Anesthesiology  Preprocedure Note       Name:  Chaitanya Gillis   Age:  62 y.o.  :  1965                                          MRN:  [id number]         Date:  2022      Surgeon: Selma Milligan):  Juan José Montenegro DO    Procedure: Procedure(s):  LEFT HIP TOTAL ARTHROPLASTY ANTERIOR APPROACH - MEDACTA    Medications prior to admission:   Prior to Admission medications    Medication Sig Start Date End Date Taking?  Authorizing Provider   cyclobenzaprine (FLEXERIL) 5 MG tablet Take 5 mg by mouth 3 times daily as needed for Muscle spasms    Historical Provider, MD   GABAPENTIN PO Take 100 mg by mouth at bedtime    Historical Provider, MD   phentermine 37.5 MG capsule TAKE 1 CAPSULE BY MOUTH EVERY DAY IN THE MORNING 10/17/22 11/17/22  Lars Martinez MD   omeprazole (PRILOSEC) 20 MG delayed release capsule TAKE 1 CAPSULE BY MOUTH EVERY DAY 10/17/22   Lars Martinez MD   atorvastatin (LIPITOR) 20 MG tablet TAKE 1 TABLET BY MOUTH EVERY DAY 10/17/22   Lars Martinez MD   Handicap Placard MISC by Does not apply route Will be valid for 5 year 22   Darien Diop MD       Current medications:    Current Facility-Administered Medications   Medication Dose Route Frequency Provider Last Rate Last Admin    lidocaine PF 1 % injection 1 mL  1 mL IntraDERmal Once PRN Manuel Garcia MD        lactated ringers infusion   IntraVENous Continuous Manuel Garcia  mL/hr at 22 0716 Restarted at 22 0742    sodium chloride flush 0.9 % injection 5-40 mL  5-40 mL IntraVENous 2 times per day Manuel Garcia MD        sodium chloride flush 0.9 % injection 5-40 mL  5-40 mL IntraVENous PRN Manuel Garcia MD        0.9 % sodium chloride infusion   IntraVENous PRN Manuel Garcia MD        dexamethasone (PF) (DECADRON) injection 10 mg  10 mg IntraVENous Once Bing Vance DO        tranexamic acid (CYKLOKAPRON) 1,000 mg in sodium chloride 0.9 % 100 mL IVPB  1,000 mg IntraVENous Once Tasha Beal, DO        vancomycin (VANCOCIN) 1 g injection              Facility-Administered Medications Ordered in Other Encounters   Medication Dose Route Frequency Provider Last Rate Last Admin    lidocaine PF 2 % injection   IntraVENous PRN Con Dress, APRN - CRNA   80 mg at 11/01/22 5757    propofol injection   IntraVENous Continuous PRN Con Dress, APRN - CRNA 22.62 mL/hr at 11/01/22 0742 50 mcg/kg/min at 11/01/22 0742    fentaNYL (SUBLIMAZE) injection   IntraVENous PRN Con Dress, APRN - CRNA   50 mcg at 11/01/22 0718    bupivacaine 0.75% in dextrose 8.25% (intrathecal) (SENSORCAINE) 0.75-8.25 % injection   Spinal/Regional PRN Con Dress, APRN - CRNA   1.6 mL at 11/01/22 0732    tranexamic acid (CYKLOKAPRON) injection   IntraVENous PRN Con Dress, APRN - CRNA   1,000 mg at 11/01/22 0741    phenylephrine (MAGDY-SYNEPHRINE) 1 MG/10ML injection   IntraVENous PRN Con Dress, APRN - CRNA   200 mcg at 11/01/22 8450       Allergies:     Allergies   Allergen Reactions    Penicillins Nausea Only     Nauseous        Problem List:    Patient Active Problem List   Diagnosis Code    Chronic bilateral low back pain with left-sided sciatica M54.42, G89.29    Constipation K59.00    Numbness and tingling in both hands R20.0, R20.2    Chronic bilateral low back pain with bilateral sciatica M54.42, M54.41, G89.29    Gastroesophageal reflux disease without esophagitis K21.9    Mixed hyperlipidemia E78.2    Hypercholesteremia E78.00    Class 1 obesity due to excess calories without serious comorbidity with body mass index (BMI) of 30.0 to 30.9 in adult E66.09, Z68.30       Past Medical History:        Diagnosis Date    Arthritis     Heartburn     Hyperlipidemia        Past Surgical History:        Procedure Laterality Date    BLADDER SUSPENSION      x3    CARPAL TUNNEL RELEASE Bilateral     COLONOSCOPY      ENDOSCOPY, COLON, DIAGNOSTIC      HERNIA REPAIR umbilical    HYSTERECTOMY (CERVIX STATUS UNKNOWN)      OVARY REMOVAL      STOMACH SURGERY      Gastric Sleeve       Social History:    Social History     Tobacco Use    Smoking status: Former     Packs/day: 1.00     Years: 23.00     Pack years: 23.00     Types: Cigarettes     Quit date: 2000     Years since quittin.8    Smokeless tobacco: Never   Substance Use Topics    Alcohol use: Not Currently     Comment: rarely                                Counseling given: Not Answered      Vital Signs (Current):   Vitals:    22 0601 22 0709   BP: 102/81 117/60   Pulse: 80 77   Resp: 18 16   Temp: 97.5 °F (36.4 °C)    SpO2: 99% 100%   Weight: 166 lb 4.8 oz (75.4 kg)    Height: 5' 3\" (1.6 m)                                               BP Readings from Last 3 Encounters:   22 117/60   10/17/22 121/78   22 110/74       NPO Status: Time of last liquid consumption:                         Time of last solid consumption:                         Date of last liquid consumption: 10/31/22                        Date of last solid food consumption: 10/31/22    BMI:   Wt Readings from Last 3 Encounters:   22 166 lb 4.8 oz (75.4 kg)   10/17/22 164 lb (74.4 kg)   10/03/22 168 lb (76.2 kg)     Body mass index is 29.46 kg/m².     CBC:   Lab Results   Component Value Date/Time    WBC 6.4 10/07/2022 11:00 AM    RBC 4.83 10/07/2022 11:00 AM    HGB 13.9 10/07/2022 11:00 AM    HCT 42.7 10/07/2022 11:00 AM    MCV 88.4 10/07/2022 11:00 AM    RDW 13.5 10/07/2022 11:00 AM     10/07/2022 11:00 AM       CMP:   Lab Results   Component Value Date/Time     10/07/2022 11:00 AM    K 4.1 10/07/2022 11:00 AM     10/07/2022 11:00 AM    CO2 27 10/07/2022 11:00 AM    BUN 17 10/07/2022 11:00 AM    CREATININE 0.67 10/07/2022 11:00 AM    GFRAA >60 2022 02:04 PM    LABGLOM >60 10/07/2022 11:00 AM    GLUCOSE 84 10/07/2022 11:00 AM    PROT 6.7 10/07/2022 11:00 AM    CALCIUM 9.3 10/07/2022 11:00 AM    BILITOT 0.5 10/07/2022 11:00 AM    ALKPHOS 85 10/07/2022 11:00 AM    AST 19 10/07/2022 11:00 AM    ALT 14 10/07/2022 11:00 AM       POC Tests: No results for input(s): POCGLU, POCNA, POCK, POCCL, POCBUN, POCHEMO, POCHCT in the last 72 hours. Coags: No results found for: PROTIME, INR, APTT    HCG (If Applicable): No results found for: PREGTESTUR, PREGSERUM, HCG, HCGQUANT     ABGs: No results found for: PHART, PO2ART, TFA3JTV, PCS0SWR, BEART, H4NIIDAM     Type & Screen (If Applicable):  No results found for: LABABO, LABRH    Drug/Infectious Status (If Applicable):  Lab Results   Component Value Date/Time    HEPCAB NONREACTIVE 04/13/2022 02:04 PM       COVID-19 Screening (If Applicable): No results found for: COVID19        Anesthesia Evaluation  Patient summary reviewed and Nursing notes reviewed no history of anesthetic complications:   Airway: Mallampati: II  TM distance: >3 FB   Neck ROM: full  Mouth opening: > = 3 FB   Dental:    (+) upper dentures and lower dentures      Pulmonary:normal exam        (-) COPD and asthma                           Cardiovascular:  Exercise tolerance: good (>4 METS),   (+) hyperlipidemia    (-) past MI, CAD and CABG/stent    ECG reviewed    Rate: normal                    Neuro/Psych:               GI/Hepatic/Renal:   (+) GERD: well controlled,           Endo/Other:    (+) : arthritis:., .                 Abdominal:             Vascular: Other Findings:           Anesthesia Plan      spinal     ASA 2       Induction: intravenous. MIPS: Prophylactic antiemetics administered. Anesthetic plan and risks discussed with patient. Plan discussed with CRNA.     Attending anesthesiologist reviewed and agrees with Preprocedure content                Ivy Ward DO   11/1/2022

## 2022-11-01 NOTE — DISCHARGE INSTRUCTIONS
ANY ORTHOPEDIC QUESTIONS OR ANY OTHER CONCERNS YOU MAY CALL THE ORTHOPEDIC COORDINATOR:  Otoniel Mohr RN, MSN  [phone]  [email or web address]    DISCHARGE INSTRUCTIONS  Caring for yourself after joint replacement surgery (Total Hip and Total Knee Replacement)    Activity and Therapy  Receive physical therapy three times per week. (Pain medication one hour prior to therapy)   Perform PT exercises on own when not receiving home or outpatient PT. Ideally exercises should be at least two times a day. Increase level of activity and ambulation each day. Perform deep breathing exercises daily. Patient provides self-care when possible. Work on Range of motion for Total knee patients. No pillow under the knee for Total knee patients. Elevate the surgical leg when seated. Diet:  Increase oral intake of fruits, fiber and water to prevent constipation. Drink fluids frequently and take stool softeners to aid in bowel motility. Increase protein intake/reduce high-sugar intake to help promote healing and prevent infection. Incision Care:  Keep Aquacel or other dressing intact until seen and removed by surgeon, unless saturated, in which case, call surgeon and request instructions. If dressing falls off, call surgeon. [facility] on in the am and off in the pm to reduce swelling. Ice affected area four times a day, for twenty minutes. Pain Medications and Anticoagulant  You have been place on an anticoagulant to prevent blood clots. Take this medication exactly as prescribed. Be alert for signs of bleeding. Take care not to injure yourself. You have been provided pain medicine to control your pain. Do not take more narcotics than prescribed. You may begin weaning from narcotics as your pain level improves by decreasing the amount or frequency of the narcotics. You may also take plain acetaminophen as an alternate to the narcotics. Never exceed the recommended dosage.    Ice, rest and elevating the surgical limb also help with pain control. When to call the Surgeon:  Increased redness, warmth, drainage, swelling or odor from incision site. Temperature above 101 degrees. Pain not controlled by prescribed medications. Calf tenderness, swelling, or redness. Shortness of breath or chest pain. If you cannot urinate and have been consuming liquids  Any incision or surgical-related concerns. Call surgeon with concerns PRIOR TO going to hospital.    Normal Conditions:  Swelling in the operative leg: this should reduce over time. Bruising behind the knee and around surgical area. Some post-operative pain. Constipation related to pain medications/decreased mobility. (Increase fiber & water intake.)   Slight warmth of operative leg. Fatigue and moderate pain after therapy. Numbness near the incision site. Nausea - take pain medications with food. Cut back on pain medication. NOTE: Remember to go to follow-up orthopedic appointment with surgeon      Keep it Clean - Post-Operative Home instructions    These instructions are to help you have the best possible recovery after your surgical procedure. [facility] is here to support you. If you have questions, call [phone] Monday through Friday from 7:30AM to 8:30PM to speak to a nurse. If you need to speak to someone outside of these hours, call your physician. Incision Dos and Donts  Do wash hands before and after dressing changes or when you have had any contact with your incision. Use hand  or antibacterial soap. Do keep your incision clean and dry. Its OK to wash the skin around your incision with mild soap and water. Do change your dressing as you were told. Do notify your doctor if the dressing becomes wet or dirty. Do use a clean washcloth every time when cleaning your incision. Do sleep on clean linens. Do keep pets away from incision site.   Dont sit in a bathtub, pool, or hot tub until your incision is fully closed and any drains are removed. Dont scrub, pick, scratch, or pull at your incision. Dont use oils, lotions, or creams on your incision unless your healthcare provider approves it. Follow-up  You will have one or more follow-up visits with your healthcare provider. These are needed to check how well youre healing. Your drain, stitches, or staples may also be removed during these visits. Do not miss your follow-up visit, even if you are feeling better. Call your healthcare provider right away if you have the following:  Fever of 100.4°F (38°C) or higher, or as advised by your healthcare provider. Chest pain or trouble breathing. Pain or tenderness in your leg(s). Increased pain, redness, swelling, bleeding, or foul-smelling drainage at the incision site. Incision changes, separates or is hot to the touch. Problems with the drain if you have one. Itchy, swollen skin; skin rash.     Medicines, Diet, and Activity:   Refer to your discharge paperwork for further instructions

## 2022-11-01 NOTE — ANESTHESIA PROCEDURE NOTES
Spinal Block    End time: 11/1/2022 7:35 AM  Reason for block: primary anesthetic  Staffing  Performed: anesthesiologist   Anesthesiologist: Nasima Shay DO  Resident/CRNA: KOBY Link CRNA  Spinal Block  Patient position: sitting  Prep: Betadine  Patient monitoring: continuous pulse ox and frequent blood pressure checks  Approach: midline  Location: L3/L4  Guidance: paresthesia technique  Provider prep: mask and sterile gloves  Local infiltration: lidocaine  Needle  Needle type: Quincke   Needle gauge: 25 G  Needle length: 3.5 in  Assessment  Sensory level: T6  Swirl obtained: Yes  CSF: clear  Attempts: 2  Hemodynamics: stable  Preanesthetic Checklist  Completed: patient identified, IV checked, site marked, risks and benefits discussed, surgical/procedural consents, equipment checked, pre-op evaluation, timeout performed, anesthesia consent given, oxygen available, monitors applied/VS acknowledged, fire risk safety assessment completed and verbalized and blood product R/B/A discussed and consented

## 2022-11-02 NOTE — OP NOTE
Operative Note    Patient: Jonathan Rehman  YOB: 1965  MRN: [id number]     Date of Procedure: 11/1/2022     Pre-Op Diagnosis: Left hip osteoarthritis     Post-Op Diagnosis: Left hip osteoarthritis       Procedure(s): Left total hip arthroplasty     Surgeon(s): Jacque Busch DO     Assistant: Resident: Mireya Koch DO; Arnulfo Celis DO     Anesthesia: Spinal     Estimated Blood Loss (mL): 840 mL     Complications: None     Specimens: * No specimens in log *     Implants:  Implant Name Type Inv. Item Serial No.  Lot No. LRB No. Used Action   SHELL ACET OD54MM LNR DMG DBL MOBILITY MPACT - [id number]   SHELL ACET OD54MM LNR DMG DBL MOBILITY MPACT   [employer] [id number] Left 1 Implanted   STEM FEM SZ 9 LAT HIP MECTAGRIP CEMENTLESS FLAT DBL TAPR - [id number]   STEM FEM SZ 9 LAT HIP MECTAGRIP CEMENTLESS FLAT DBL TAPR   [employer] [id number] Left 1 Implanted   LINER ACET SZ DMG OD54MM ID28MM GRN DBL MOBILITY HIGHCROSS - [id number]   LINER ACET SZ DMG OD54MM ID28MM GRN DBL MOBILITY HIGHCROSS   [employer] [id number] Left 1 Implanted   HEAD FEM SZ M [id number] FOR QUADRA HIP SYS BIOLOX DELT - [id number]   HEAD FEM SZ M [id number] FOR QUADRA HIP SYS BIOLOX DELT   [employer] [id number] Left 1 Implanted          Drains: * No LDAs found *     Findings: Left hip osteoarthritis    The patient is a 57F who presented for left total hip  arthroplasty. The patient has had progressively worsening right hip  pain, which has failed to improve despite conservative therapy to  include activity modification. Her symptoms are greatly affecting her  usual activities of daily living, and as a result, she has indicated that she  would like surgical intervention at this time. The patient was identified preoperatively, where consent was obtained, signed,  placed on the chart. The procedure was described, questions were  answered.   The risks of the procedure were described to include, but not  limited to, the risk of anesthesia; infection; bleeding; need for  further surgery in the future; numbness, tingling, weakness, or pain to  the left lower extremity; scar; stiffness; hip dislocation; fracture;  leg-length inequality; DVT; and death. The patient notes understanding of these  risks and states he wishes to proceed. The pateint was administered IV antibiotics perioperatively. The patient was also  administered 1 gm of IV tranexamic acid just prior to incision and a  second 1-gm aliquot just after closure of incision. General anesthesia  was affected after a regional block was affected to the operative lower  extremity. The operative foot and ankle were well padded and placed in a  traction boot davila for the Evolve IP Medacta table. The nonoperative lower  extremity was placed on a well-padded Dinero stand. The operative lower  extremity was then prepped and draped in sterile fashion after an  appropriate surgical time-out. Incision was made over the muscle belly  of the tensor fascia saman for a total incision length of approximately  4-4.5 inches. Sharp incision was carried through the skin and  subcutaneous tissue. Full-thickness skin flaps were raised over the  fascia of the fascia saman, which was split in-line with the skin  incision. A subfascial dissection was carried anteriorly over the  muscle belly of the TFL, exposing the tensor-sartorial interval, where  the circumflex vessels were found, ligated, isolated, and divided. The  pericapsular fat was removed, and a subrectus and iliocapsularis blunt  dissection was carried out over the anterior capsule, exposing it. Anterior capsulectomy was affected with electrocautery, and retractors  were placed over the superior and inferior femoral neck. A femoral neck  cut was then made approximately 1.5-2.0 cm proximal to the lesser  trochanter, and the femoral head was removed from the acetabulum.    Labrum and pulvinar were removed from the acetabulum and then sequential  reaming of the acetabulum was made to the appropriate size. The  appropriately sized acetabular cup was then placed and impacted at an  appropriate theta angle and version until fully seated. It was noted be  quite stable, and supplemental screw fixation was not felt to be  necessary. A dual mobility neutral lip liner was placed, impacted until fully seated. Attention was then drawn to exposure of the proximal femur, which was  done by release of the pubofemoral and ischiofemoral ligaments,  retractor placed against the medial proximal femur, external rotation,  extension, and adduction, bringing the proximal femur into the incision. Once this was done, sequential box osteotome, canal finding reamer, and  broaching of the femoral canal was made until the appropriately sized  broach was felt to be in place. This was left in place, and trial dual mobility head  and necks were placed. Hip was reduced, put through range of motion,  was found to be stable throughout the arc of range of motion. Superimposed fluoroscopic images showed restoration of leg length and  offset, and the hip was stable throughout the arc of range of motion. The hip was then gently dislocated. Trials were removed. Implantable  components were placed and impacted until fully seated. Hip was once  again reduced, put through range of motion, was found to be stable with  restoration of leg length and offset on superimposed fluoroscopic  images. The hip was thoroughly irrigated with Irrisept for 1 minute and then thoroughly irrigated and suctioned with sterile saline. 1 g Vancomycin powder placed in joint. The fascia was closed using absorbable suture, as was the subcutaneous  tissue. Running subcuticular Stratafix suture was utilized for  subcuticular closure. Dermabond was used cutaneously. Sterile dressing  was applied. Anesthesia was reversed.   The patient was taken to the  postoperative recovery room in stable condition.   There were no  immediate postoperative complications, and the procedure was tolerated  well.      -----------------------------  Chris Singh DO

## 2022-11-02 NOTE — PROGRESS NOTES
Patient discharged off unit per wheelchair with daughter. AVS reviewed and scripts sent with patient. Daughter is driving patient home and will be caring for her.

## 2022-11-02 NOTE — PROGRESS NOTES
Patient and her daughter upset about care on dayshift and requesting to speak to supervisor.  April Hsu notified and updated on complaints. Supervisor at bedside to speak to patient prior to discharge.

## 2022-11-04 ENCOUNTER — HOSPITAL ENCOUNTER (OUTPATIENT)
Dept: PHYSICAL THERAPY | Age: 57
Setting detail: THERAPIES SERIES
Discharge: HOME | End: 2022-11-04
Payer: MEDICARE

## 2022-11-04 PROCEDURE — 97161 PT EVAL LOW COMPLEX 20 MIN: CPT

## 2022-11-04 PROCEDURE — 97110 THERAPEUTIC EXERCISES: CPT

## 2022-11-04 NOTE — FLOWSHEET NOTE
[facility] Fall Risk Assessment    Patient Name:  Jeremías Damon  : 1965    Risk Factor Scale  Score   History of Falls [x] Yes  [] No 25  0 25   Secondary Diagnosis [x] Yes  [] No 15  0 15   Ambulatory Aid [] Furniture  [x] Crutches/cane/walker  [] None/bedrest/wheelchair/nurse 30  15  0 15   IV/Heparin Lock [] Yes  [x] No 20  0 0   Gait/Transferring [] Impaired  [x] Weak  [] Normal/bedrest/immobile 20  10  0 10   Mental Status [] Forgets limitations  [x] Oriented to own ability 15  0 0      Total: 65     Based on the Assessment score: check the appropriate box.     []  No intervention needed   Low =   Score of 0-24    []  Use standard prevention interventions Moderate =  Score of 24-44   [] Give patient handout and discuss fall prevention strategies   [] Establish goal of education for patient/family RE: fall prevention strategies    [x]  Use high risk prevention interventions High = Score of 45 and higher   [x] Give patient handout and discuss fall prevention strategies   [x] Establish goal of education for patient/family Re: fall prevention strategies   [] Discuss lifeline / other resources -- N/A; most Risk Factors are due to recent surgery, though most recent fall was 10+ months ago    Electronically signed by:   Farrah Ingram PT  Date: 2022

## 2022-11-04 NOTE — CONSULTS
[x] [facility]) [facility]  [facility]  [address]  [phone]  F: [phone] [] [address]  [facility]   [address]  P: [phone]  F: [phone] [] [address]  [address]  P: [phone]  F: [phone] [] [address]  P: [phone]  F: [phone] [] [address]  [facility]   [address]   [place]: [phone]  F: [phone]      Physical Therapy Lower Extremity Evaluation    Date:  2022  Patient: Winston Giles  : 1965  MRN: [id number]  Physician: Dr. Blanca Michelle DO     Insurance: [facility], Medicaid. BMN  Medical Diagnosis: s/p surgery;arthritis of left hip    Rehab Codes: M 25.552, M 25.652, M 62.81, R 26.29, Z 91.81, R 60.0  Onset date: 2022    Next 's appt.: 2022     Subjective:   CC: Since surgery: using a walker, sleeping on the couch (normally bed). Normally sleeps 1-8 hours based on pain and being a light sleeper. Will set goal to sleep 4 hours or greater without waking due to hip pain. One fall over the winter on the ice. Normally stands for showers, currently using a stool. Unable to drive since surgery. Living with daughter and normally does housekeeping tasks but unable at this time. Gets a shooting burning pain in left posterior hip - cramp, left buttocks. Had to stand to stop the pain.    HPI: (2022)  2022: left PAPO Incision was made over the muscle belly of the tensor fascia saman (anterior approach)    PMHx: [] Unremarkable [] Diabetes [] HTN  [] Pacemaker   [] MI/Heart Problems [] Cancer [x] Arthritis [x] Other: glasses              [x] Refer to full medical chart  In EPIC    Comorbidities:   [x] Obesity [] Dialysis  [] N/A   [] Asthma/COPD [] Dementia [] Other:   [] Stroke [] Sleep apnea [] Other:   [] Vascular disease [] Rheumatic disease [] Other:     Tests: [x] X-Ray: 11/1/22:      FINDINGS:   Status post left hip arthroplasty; orthopedic hardware appears to be in   satisfactory position on all views. Adjacent soft tissue gas and overlying   bedding noted. No fracture. Mild DJD right hip. Genera changes lumbosacral spine. Impression   Status post left hip arthroplasty; hardware appears to be in satisfactory   position. [] MRI:  [] Other:    Medications: [x] Refer to full medical record [] None [] Other:  Allergies:      [x] Refer to full medical record [] None [] Other:    Function:  Hand Dominance  [x] Right  [] Left  Patient lives with: Daughter, granddaughter, grandson, other family   In what type of home []  One story   [x] Two story - bedroom basement, main level for full bathroom. [] Split level   Number of stairs to enter 5-7   With handrail on the [x]  Right to enter   [] Left to enter   Bathroom has a []  Tub only  [x] Tub/shower combo   [] Walk in shower    []  Grab bars   Washing machine is on [x]  Main level   [] Second level   [] Basement   Employer    Job Status []  Normal duty   [] Light duty   [] Off due to condition    []  Retired   [] Not employed   [x] Disability  [] Other:  []  Return to work:    Work activities/duties      ADL/IADL Previous level of function Current level of function Who currently assists the patient with task   Bathing  [x] Independent  [] Assist [] Independent  [x] Assist Daughter assist to ensure safety (first time)   Dress/grooming [x] Independent  [] Assist [] Independent  [x] Assist Help waist down   Transfer/mobility [x] Independent  [] Assist [x] Independent  [] Assist    Feeding [x] Independent  [] Assist [x] Independent  [] Assist    Toileting [x] Independent  [] Assist [x] Independent  [] Assist    Driving [x] Independent  [] Assist [] Independent  [x] Assist Housekeeping [x] Independent  [] Assist [] Independent  [x] Assist    Grocery shop/meal prep [x] Independent  [] Assist [] Independent  [x] Assist      Gait Prior level of function Current level of function    [x] Independent  [] Assist [x] Independent  [] Assist   Device: [x] Independent [] Independent    [] Straight Cane [] Quad cane [] Straight Cane [] Quad cane    [] Standard walker [] Rolling walker   [] 4 wheeled walker [] Standard walker [x] Rolling walker   [] 4 wheeled walker    [] Wheelchair [] Wheelchair     Pain:  [x] Yes  [] No Location: left hip   Pain Rating: (0-10 scale) 5+/10  Pain altered Tx:  [] Yes  [x] No  Action:    Symptoms:  [x] Improving [] Worsening [x] Same  Better:  [] AM    [] PM    [] Sit    [] Rise/Sit    []Stand    [] Walk    [] Lying    [x] Other: pain meds  Worse: [] AM    [] PM    [] Sit    [] Rise/Sit    []Stand    [] Walk    [] Lying    [] Bend                      [] Valsalva    [x] Other: Moving  Sleep: [] OK    [x] Disturbed - prior to surgery disturbed due to pain in hip and back    Objective:    ROM  ° A/P STRENGTH    Left Right Left Right   Hip Flex 90 110 2+/5 4+   Ext       ER NT NT     IR NT NT     ABD 15 20 2/5 4+   ADD 0 0 3-/5 4+   Knee Flex 106 125 3+/5 4+   Ext 0 0 3+/5 4+     OBSERVATION No Deficit Deficit Not Tested Comments   Posture       Forward Head [x] [] []    Rounded Shoulders [] [x] []    Kyphosis [] [x] []    Lordosis [x] [] []    Lateral Shift [] [] [x]    Scoliosis [] [] [x]    Iliac Crest [] [] [x]    PSIS [] [] [x]    ASIS [] [] [x]    Genu Valgus [x] [] []    Genu Varus [] [x] []    Genu Recurvatum [x] [] []    Pronation [x] [] []    Supination [x] [] []    Leg Length Discrp [] [] [x]    Slumped Sitting [] [x] []    Palpation [] [x] [] Tender lateral left leg, around incision (around bandage)   Sensation [] [x] [] Burning cramp pain left buttocks   Edema [] [x] [] Left thigh appears swollen   Neurological [x] [] []    Patellar Mobility [] [] [x] Patellar Orientation [] [] [x]    Gait [] [x] [] Analysis:  Difficulty advancing left leg, decreased heel strike, use of walker     FUNCTION Normal Difficult Unable   Sitting [] [x] []   Standing [] [x] []   Ambulation [] [x] []   Groom/Dress [] [x] []   Lift/Carry [] [x] []   Stairs [] [x] []   Bending [] [x] []   Squat [] [x] []   Kneel [] [] [x]     Functional Test: LEFS Score: 91% functionally impaired     Comments:  Patient reports cramp burning pain in left mid buttock, lateral to SI, superior to ischial tuberosity. States that she had this pain prior to surgery, and would often have to anterior/posterior tilt her pelvis, and she would feel a crunching type sensation, and it would alleviate. She has experienced this three times since surgery, and has to rub the area and stand up for the pain to alleviate. Assessment:  Patient would benefit from skilled physical therapy services in order to: decrease left hip pain, improve gait, improve strength, improve balance, improve sleep, and improve overall functional abilities. Problem list, as detailed above:   [x] ? Pain     [x] ? ROM    [x] ? Strength    [x] ? Function:   [x] ? Balance  [x] Edema  [x] Postural Deviations  [x] Gait Deviations  [] Other     STG: (to be met in 10 treatments)  ? Pain: left hip pain improve to 4/10 at max when trying to sleep at night and during the day while being active  ? ROM: Left knee flexion improve to 120 degrees  ? Strength: Left LE strength improve to grossly 3+/5 throughout hip and 4-/5 grossly knee and ankle  ? Function: Patient to report ability to go up/down steps to her basement bedroom  Patient to be independent with home exercise program as demonstrated by performance with correct form without cues.   Demonstrate Knowledge of fall prevention  LEFS score improve to 50% functionally impaired or less  LTG: (to be met in 20 treatments)  Left hip pain improve to 1/10 after being active a normal day for the patient  Left LE strength improve to 4/5 without pain  Patient able to SLS on left leg without hip drop, demonstrating improved glut strength, for 15 seconds or greater without UE support and no LOB  LEFS score improve to 20% functionally impaired or less  Patient to report ability to sleep 5 hours or greater without waking due to hip pain. Patient goals: \"Walk without walker\"    Rehab Potential:  [x] Good  [] Fair  [] Poor   Suggested Professional Referral:  [x] No  [] Yes:  Barriers to Goal Achievement:  [x] No  [] Yes:  Domestic Concerns:  [x] No  [] Yes:    Pt. Education:  [x] Plans/Goals, Risks/Benefits discussed  [x] Home exercise program    Method of Education: [x] Verbal  [x] Demo  [x] Written -- see chart below  Comprehension of Education:  [x] Verbalizes understanding. [x] Demonstrates understanding. [x] Needs Review. [] Demonstrates/verbalizes understanding of HEP/Ed previously given. Treatment Plan:  [x] Therapeutic Exercise   10648  [] Iontophoresis: 4 mg/mL Dexamethasone Sodium Phosphate  mAmin  75965   [x] Therapeutic Activity  45390 [x] Vasopneumatic cold with compression  05844    [x] Gait Training   53819 [] Ultrasound   72781   [] Neuromuscular Re-education  06667 [x] Electrical Stimulation Unattended  97313   [x] Manual Therapy  25314 [x] Electrical Stimulation Attended  26184   [x] Instruction in HEP  [] Lumbar/Cervical Traction  98427   [] Aquatic Therapy   88729 [x] Cold/hotpack    [] Massage   86459      [] Dry Needling, 1 or 2 muscles  52604   [] Biofeedback, first 15 minutes   61227  [] Biofeedback, additional 15 minutes   76049 [] Dry Needling, 3 or more muscles  95513     []  Medication allergies reviewed for use of    Dexamethasone Sodium Phosphate 4mg/ml     with iontophoresis treatments. Pt is not allergic.     Frequency:  2 x/week for 20 visits (Therapist recommended three visits weekly and patient stated she would be comfortable with two visits weekly to

## 2022-11-08 ENCOUNTER — HOSPITAL ENCOUNTER (OUTPATIENT)
Dept: PHYSICAL THERAPY | Age: 57
Setting detail: THERAPIES SERIES
Discharge: HOME | End: 2022-11-08
Payer: MEDICARE

## 2022-11-08 ENCOUNTER — TELEPHONE (OUTPATIENT)
Dept: FAMILY MEDICINE CLINIC | Age: 57
End: 2022-11-08

## 2022-11-08 DIAGNOSIS — E78.2 MIXED HYPERLIPIDEMIA: ICD-10-CM

## 2022-11-08 DIAGNOSIS — Z98.890 STATUS POST SURGERY: Primary | ICD-10-CM

## 2022-11-08 PROCEDURE — 97110 THERAPEUTIC EXERCISES: CPT

## 2022-11-08 RX ORDER — ATORVASTATIN CALCIUM 20 MG/1
TABLET, FILM COATED ORAL
Qty: 100 TABLET | Refills: 1 | Status: SHIPPED | OUTPATIENT
Start: 2022-11-08

## 2022-11-08 NOTE — FLOWSHEET NOTE
[x] [facility]) [facility] &  Therapy  [address]  [phone]  F: [phone] [] [address]  [address]   [address]  P: [phone]  F: [phone] [] [address]  [address]  P: [phone]  F: [phone] [] [address]  P: [phone]  F: [phone] [] [address]  [facility]   [address]   [place]: [phone]  F: [phone]      Physical Therapy Daily Treatment Note    Date:  2022  Patient Name:  Kathya Perea    :  1965  MRN: [id number]  Physician: Dr. Qi Lynn, DO                            Insurance: [facility], Medicaid. BMN  Medical Diagnosis: s/p surgery;arthritis of left hip                 Rehab Codes: M 25.552, M 25.652, M 62.81, R 26.29, Z 91.81, R 60.0  Onset date: 2022                         Next 's appt.: 2022  Visit# / total visits: ; Progress note for Medicare patient due at visit #10     Cancels/No Shows: 0/0    Subjective:    Pain:  [x] Yes  [] No Location:  left  hip/PAPO  Pain Rating: (0-10 scale) 4/10 buttock area. Pain altered Tx:  [x] No  [] Yes  Action:  Comments: Addressing HEP, walking each hr, and doing some lite chores, but not sleeping well due to not feeling tired from day, states pain is not keeping her awake. Reports teds hose were causing significant numbness in LE but  leg is feeling better today due to getting hose up higher on hip. Objective:  Modalities: vasocompression x 15 mins moderate, 38°,  trial 40° next time. Precautions: Anterior approach  Exercises:  Exercise   L PAPO 11.1.22 Reps/ Time Weight/ Level Comments   Sci fit      Gait  ft x1  Pt wants walker slightly high, makes back feel better. standing      calf stretch 3x20\"  wedge   Heel raises 20x     march 15x     HS curls 15x     3 way hip  10-15x A Seven, ext only   Step ups add 2\" Fwd, lateral    SLS add  Add as able         Sitting      LAQ 15x A    HS stretch 3x20\"  stool         Supine      Gluteal sets 15x5\"     Quad sets 10x5\"     HS sets 10x5\"     SAQ 10x     Iso hip add 15x5\"     Heel slides 15x     Hip abd 10x     Gentle piriformis stretch add           Prone       Hip flexor stretch add                       Other:      Treatment Charges: Mins Units   []  Modalities     [x]  Ther Exercise 45 3   []  Manual Therapy     []  Ther Activities     []  Aquatics     []  Vasocompression     [x]  Other gt    3 -   Total Treatment time 48 3       Assessment: [x] Progressing toward goals Progressed PAPO exercises for ROM/strengthening with education on purpose and technique. Pt demonstrated good tolerance and understanding. Added vasocompression for edema and pain control, will increase vaso temperature to 40° next session due to pt responses. [] No change. [] Other:  [x] Patient would continue to benefit from skilled physical therapy services in order to: decrease left hip pain, improve gait, improve strength, improve balance, improve sleep, and improve overall functional abilities    STG/LTG  STG: (to be met in 10 treatments)  ? Pain: left hip pain improve to 4/10 at max when trying to sleep at night and during the day while being active  ? ROM: Left knee flexion improve to 120 degrees  ? Strength: Left LE strength improve to grossly 3+/5 throughout hip and 4-/5 grossly knee and ankle  ? Function: Patient to report ability to go up/down steps to her basement bedroom  Patient to be independent with home exercise program as demonstrated by performance with correct form without cues.   Demonstrate Knowledge of fall prevention  LEFS score improve to 50% functionally impaired or less  LTG: (to be met in 20 treatments)  Left hip pain improve to 1/10 after being active a normal day for the patient  Left LE strength improve to 4/5 without pain  Patient able to SLS on left leg without hip drop, demonstrating improved glut strength, for 15 seconds or greater without UE support and no LOB  LEFS score improve to 20% functionally impaired or less  Patient to report ability to sleep 5 hours or greater without waking due to hip pain. Patient goals: \"Walk without walker\"     Pt. Education:  [x] Yes  [] No  [x] Reviewed Prior HEP/Ed  Method of Education: [x] Verbal  [x] Demo  [] Written  Comprehension of Education:  [x] Verbalizes understanding. [x] Demonstrates understanding. [] Needs review. [x] Demonstrates/verbalizes HEP/Ed previously given. Plan: [x] Continue current frequency toward long and short term goals. [x] Specific Instructions for subsequent treatments: see above    Frequency:  2 x/week for 20 visits (Therapist recommended three visits weekly and patient stated she would be comfortable with two visits weekly to begin, then will re-evaluate if she wants to come a third visit weekly). Progress written HEP.      Time In: 1035            Time Out: 1131    Electronically signed by:  Chance Deal PTA

## 2022-11-11 ENCOUNTER — HOSPITAL ENCOUNTER (OUTPATIENT)
Dept: PHYSICAL THERAPY | Age: 57
Setting detail: THERAPIES SERIES
Discharge: HOME | End: 2022-11-11
Payer: MEDICARE

## 2022-11-11 PROCEDURE — 97110 THERAPEUTIC EXERCISES: CPT

## 2022-11-11 NOTE — FLOWSHEET NOTE
[x] [facility]) [facility] &  Therapy  [address]  [phone]  F: [phone] [] [address]  [facility]   [address]  P: [phone]  F: [phone] [] [address]  [address]  P: [phone]  F: [phone] [] [address]  P: [phone]  F: [phone] [] [address]  [facility]   [address]   [place]: [phone]  F: [phone]      Physical Therapy Daily Treatment Note    Date:  2022  Patient Name:  Higinio Jean    :  1965  MRN: [id number]  Physician: Dr. Christin Andrade,                             Insurance: [employer], Medicaid. BMN  Medical Diagnosis: s/p surgery;arthritis of left hip                 Rehab Codes: M 25.552, M 25.652, M 62.81, R 26.29, Z 91.81, R 60.0  Onset date: 2022                         Next 's appt.: 2022  Visit# / total visits: ; Progress note for Medicare patient due at visit #10     Cancels/No Shows: 0/0    Subjective:    Pain:  [x] Yes  [] No Location:  left  hip/PAPO  Pain Rating: (0-10 scale) 0/10  left hip,   Pain altered Tx:  [x] No  [] Yes  Action:  Comments:   Arrival without assist.device, demonstrated reciprocal gait and just very mild limp initially.     Objective:  Modalities: vasocompression x 15 mins moderate, 40°  Precautions: Anterior approach  Exercises:  Exercise   L PAPO 11.1.22 Reps/ Time Weight/ Level Comments   Sci fit 6 mins L2                 standing   Added wt and tband    calf stretch 3x20\"  wedge   Heel raises 2x15 1.5 lbs    march 15x 1.5 lbs    HS curls 15x 1.5 lbs L only   3 way hip  L OKC 15x 1.5 lbs Seven,    3 way hip  L CKC  15x lime Added 11/11   Step ups 10xea 4\" Fwd, lateral added    SLS 3x15\" No UE   TG squats  2x10   L30 Added 11/11         Sitting      LAQ 15x 1.5 lbs Added wt 11/11   HS stretch 3x20\"  stool         Supine      Gluteal sets 15x5\"     Quad sets 15x5\"     HS sets      SAQ 15x 1.5 lbs Added wt 11/11   Iso hip add 15x5\"     Heel slides 15x  slider   Hip abd 15x  slider   bridging 10x  Added 11/11         Prone       Hip flexor stretch 3x30\"  Strap,Added 11/11                     Other:  Specific Instructions for subsequent treatments: progress sets w/ 3 way hip tband exercises, add piriformis stretch. Treatment Charges: Mins Units   []  Modalities     [x]  Ther Exercise  55 4   []  Manual Therapy     []  Ther Activities     []  Aquatics     []  Vasocompression     []  Other gt     Total Treatment time 55 4       Assessment: [x] Progressing toward goals  many prepossessions with exercises for strengthening and standing stability. Good  pt understanding with technique post education and demonstrated good standing muscle endurance. [] No change. [] Other:  [x] Patient would continue to benefit from skilled physical therapy services in order to: decrease left hip pain, improve gait, improve strength, improve balance, improve sleep, and improve overall functional abilities    STG/LTG  STG: (to be met in 10 treatments)  ? Pain: left hip pain improve to 4/10 at max when trying to sleep at night and during the day while being active  ? ROM: Left knee flexion improve to 120 degrees  ? Strength: Left LE strength improve to grossly 3+/5 throughout hip and 4-/5 grossly knee and ankle  ? Function: Patient to report ability to go up/down steps to her basement bedroom  Patient to be independent with home exercise program as demonstrated by performance with correct form without cues.   Demonstrate Knowledge of fall prevention  LEFS score improve to 50% functionally impaired or less  LTG: (to be met in 20 treatments)  Left hip pain improve to 1/10 after being active a normal day for the patient  Left LE strength improve to 4/5 without pain  Patient able to SLS on left leg without hip drop, demonstrating improved glut strength, for 15 seconds or greater without UE support and no LOB  LEFS score improve to 20% functionally impaired or less  Patient to report ability to sleep 5 hours or greater without waking due to hip pain. Patient goals: \"Walk without walker\"     Pt. Education:  [x] Yes  [] No  [x] Reviewed Prior HEP/Ed  Method of Education: [x] Verbal  [x] Demo  [] Written  Comprehension of Education:  [x] Verbalizes understanding. [x] Demonstrates understanding. [] Needs review. [x] Demonstrates/verbalizes HEP/Ed previously given. Plan: [x] Continue current frequency toward long and short term goals. [x] Specific Instructions for subsequent treatments: see above    Frequency:  2 x/week for 20 visits (Therapist recommended three visits weekly and patient stated she would be comfortable with two visits weekly to begin, then will re-evaluate if she wants to come a third visit weekly). Progress written HEP.      Time In: 1214           Time Out: 1320    Electronically signed by:  Nai Bobo PTA

## 2022-11-15 ENCOUNTER — HOSPITAL ENCOUNTER (OUTPATIENT)
Dept: PHYSICAL THERAPY | Age: 57
Setting detail: THERAPIES SERIES
Discharge: HOME | End: 2022-11-15
Payer: MEDICARE

## 2022-11-15 PROCEDURE — 97110 THERAPEUTIC EXERCISES: CPT

## 2022-11-15 NOTE — FLOWSHEET NOTE
[x] [facility]) [facility] &  Therapy  [address]  [phone]  F: [phone] [] [address]  [place] [address]   [address]  P: [phone]  F: [phone] [] [address]  [address]  P: [phone]  F: [phone] [] [address]  P: [phone]  F: [phone] [] [address]  [facility]   [address]   [place]: [phone]  F: [phone]      Physical Therapy Daily Treatment Note    Date:  11/15/2022  Patient Name:  Antwon Calderon    :  1965  MRN: [id number]  Physician: Dr. Albin Price DO                            Insurance: [facility], Medicaid. BMN  Medical Diagnosis: s/p surgery;arthritis of left hip                 Rehab Codes: M 25.552, M 25.652, M 62.81, R 26.29, Z 91.81, R 60.0  Onset date: 2022                         Next 's appt.: 2022  Visit# / total visits: ; Progress note for Medicare patient due at visit #10     Cancels/No Shows: 0/0    Subjective:    Pain:  [x] Yes  [] No Location:  left  hip/PAPO  Pain Rating: (0-10 scale) 0/10  left hip,   Pain altered Tx:  [x] No  [] Yes  Action:  Comments:  Reports being very tired, did not sleeping  well due to hip discomfort, \"Not pain\"  Reports LB is still quite uncomfortable since last session. Had ortho Md appt and MD pleased with progress. aJzz stationary at home with MD juarez. Reports she would like education on floor transfers. Daughter present.        Objective:  Modalities: vasocompression x 15 mins moderate, 40° left hip with wedge-pt deferred 11/15  Precautions: Anterior approach  Exercises:  Exercise   L PAPO 11.1.22 Reps/ Time Weight/ Level Comments Completed  11/15/2022    x   Sci fit 5 mins L2  x standing        calf stretch 3x30\"  wedge x   Heel raises 20x 1.5 lbs  x   march 15x 1.5 lbs  x   HS curls 15x 1.5 lbs L only x   3 way hip  L OKC 15x 1.5 lbs   x   3 way hip  L CKC  15x lime   x   Step ups 15xea 4\" Fwd, lateral   incr reps 11/15 x   Step downs 10x 4\" Added 11/15 x   SLS 3x15\"   x   TG squats  2x10   L30   x          Sitting       LAQ 15x 1.5 lbs     HS stretch 3x20\"  stool           Supine       Gluteal sets 15x5\"      Quad sets 15x5\"      HS sets       SAQ 15x 1.5 lbs     Iso hip add 15x5\"      Heel slides 15x  slider x   Hip abd 15x  slider x   bridging 15x  Incr. Reps  x   Piriformis stretch 3x15'   x   Prone        Hip flexor stretch 3x30\"  Strap, x                        Other: session shorten per pt request per subjective info. Pt requested 11/11/21 and 11/22 appt due to holiday week and her wks schedule. Specific Instructions for subsequent treatments: progress sets w/ 3 way hip tband exercises,. Floor transfers. Treatment Charges: Mins Units   []  Modalities     [x]  Ther Exercise  35 2   []  Manual Therapy     []  Ther Activities     []  Aquatics     []  Vasocompression     []  Other gt     Total Treatment time 35 2       Assessment: [x] Progressing toward goals   Progressed reps with step exercises and added eccentric quad work otherwise no progressions due to pt subjective and other comments. Also added gentle piriformis stretch with fair tolerance. Pt reported she will ice at home today. [] No change. [] Other:  [x] Patient would continue to benefit from skilled physical therapy services in order to: decrease left hip pain, improve gait, improve strength, improve balance, improve sleep, and improve overall functional abilities    STG/LTG  STG: (to be met in 10 treatments)  ? Pain: left hip pain improve to 4/10 at max when trying to sleep at night and during the day while being active  ? ROM: Left knee flexion improve to 120 degrees  ?  Strength: Left LE strength improve to grossly 3+/5 throughout hip and 4-/5 grossly knee and ankle  ? Function: Patient to report ability to go up/down steps to her basement bedroom  Patient to be independent with home exercise program as demonstrated by performance with correct form without cues. Demonstrate Knowledge of fall prevention  LEFS score improve to 50% functionally impaired or less  LTG: (to be met in 20 treatments)  Left hip pain improve to 1/10 after being active a normal day for the patient  Left LE strength improve to 4/5 without pain  Patient able to SLS on left leg without hip drop, demonstrating improved glut strength, for 15 seconds or greater without UE support and no LOB  LEFS score improve to 20% functionally impaired or less  Patient to report ability to sleep 5 hours or greater without waking due to hip pain. Patient goals: \"Walk without walker\"     Pt. Education:  [x] Yes  [] No  [x] Reviewed Prior HEP/Ed  Method of Education: [x] Verbal  [x] Demo  [] Written  Comprehension of Education:  [x] Verbalizes understanding. [x] Demonstrates understanding. [] Needs review. [x] Demonstrates/verbalizes HEP/Ed previously given. Plan: [x] Continue current frequency toward long and short term goals. [x] Specific Instructions for subsequent treatments: see above    Frequency:  2 x/week for 20 visits (Therapist recommended three visits weekly and patient stated she would be comfortable with two visits weekly to begin, then will re-evaluate if she wants to come a third visit weekly). Progress written HEP.   Pt requriest     Time In:   1220          Time Out:  1300    Electronically signed by:  Chance Deal PTA

## 2022-11-18 ENCOUNTER — HOSPITAL ENCOUNTER (OUTPATIENT)
Dept: PHYSICAL THERAPY | Age: 57
Setting detail: THERAPIES SERIES
Discharge: HOME | End: 2022-11-18
Payer: MEDICARE

## 2022-11-18 PROCEDURE — 97016 VASOPNEUMATIC DEVICE THERAPY: CPT

## 2022-11-18 PROCEDURE — 97110 THERAPEUTIC EXERCISES: CPT

## 2022-11-18 NOTE — FLOWSHEET NOTE
[x] [facility]) [facility] &  Therapy  [address]  [phone]  F: [phone] [] [address]  [place] [address]   [address]  P: [phone]  F: [phone] [] [address]  [address]  P: [phone]  F: [phone] [] [address]  P: [phone]  F: [phone] [] [address]  [place]   [address]   [place]: [phone]  F: [phone]      Physical Therapy Daily Treatment Note    Date:  2022  Patient Name:  Bhupinder Boyd    :  1965  MRN: [id number]  Physician: Dr. Moisés Ann,                             Insurance: [facility], Medicaid. BMN  Medical Diagnosis: s/p surgery;arthritis of left hip                 Rehab Codes: M 25.552, M 25.652, M 62.81, R 26.29, Z 91.81, R 60.0  Onset date: 2022                         Next 's appt.: 2022  Visit# / total visits: ; Progress note for Medicare patient due at visit #10     Cancels/No Shows: 0/0    Subjective:    Pain:  [] Yes  [x] No Location:  left  hip/PAPO  Pain Rating: (0-10 scale) 0/10  left hip,   Pain altered Tx:  [x] No  [] Yes  Action:  Comments:   Reports feeling better today, got some sleep. States she feel she is  not limping as much and LB is also feeling somewhat better. No device at home or community.         Objective:  Modalities: vasocompression x 15 mins moderate, 40° left hip with wedge   Precautions: Anterior approach  Exercises:  Exercise   L PAPO 11.1.22 Reps/ Time Weight/ Level Comments Completed  2022    x   Sci fit 5 mins L2  x                  standing        calf stretch 3x30\"  wedge x   Heel raises 20x 1.5 lbs  x   march 15x 1.5 lbs  x   HS curls 15x 1.5 lbs L only x   3 way hip  L OKC 15x 1.5 lbs   x   3 way hip  L CKC  15x lime   x   Step ups 15xea 4\" Fwd, lateral   x   Step downs 15x 4\" Incr. Reps 11/18 x   SLS 3x15\"  No UE support x   TG squats  2x10   L30   x    Cybex. Resistive walking-CGA 3xea  1.5 plates Added 54/20 x          Sitting       LAQ 15x 1.5 lbs Added ball 11/18 x   HS stretch 3x20\"  Stool, instructed to address w/ HEP 11/18           Supine       Gluteal sets 15x5\"      Quad sets 15x5\"      HS sets       SAQ 15x 3 lbs Incr. Wt 11/18 x   Iso hip add 15x3\"   x   Heel slides 15x 3 lbs slider x   Hip abd 15x  slider    bridging 2x10  Incr. Reps  x   SLR  1x10 AA Added 11/18 x   Piriformis stretch 3x15'              SL   Added all  11/18    clamshells 1x10 A  x   Hip abd 2x10 A/AA  x          Prone        Hs curls  10x      Hip flexor stretch 3x30\"  Strap, x                        Other:      Specific Instructions for subsequent treatments: progress sets w/ 3 way hip tband exercises,. Floor transfers as able. Progress written HEP as able. Treatment Charges: Mins Units   []  Modalities     [x]  Ther Exercise  50 3   []  Manual Therapy     []  Ther Activities     []  Aquatics     [x]  Vasocompression 15 1   []  Other gt     Total Treatment time 65 4       Assessment: [x] Progressing toward goals  progressed reps with step downs for eccentric  quad strengthening then  added  resistive walking for stability and strengthening. Required contract guard with resistive walking for stability. Also returned to many mat exercises that were held from prior session (due to fatigue/lack of sleep). Pt demonstrates good understanding and tolerance with all exercises today       [] Other:  [x] Patient would continue to benefit from skilled physical therapy services in order to: decrease left hip pain, improve gait, improve strength, improve balance, improve sleep, and improve overall functional abilities    STG/LTG  STG: (to be met in 10 treatments)  ?  Pain: left hip pain improve to 4/10 at max when trying to sleep at night and during the day while being active  ? ROM: Left knee flexion improve to 120 degrees  ? Strength: Left LE strength improve to grossly 3+/5 throughout hip and 4-/5 grossly knee and ankle  ? Function: Patient to report ability to go up/down steps to her basement bedroom  Patient to be independent with home exercise program as demonstrated by performance with correct form without cues. Demonstrate Knowledge of fall prevention  LEFS score improve to 50% functionally impaired or less  LTG: (to be met in 20 treatments)  Left hip pain improve to 1/10 after being active a normal day for the patient  Left LE strength improve to 4/5 without pain  Patient able to SLS on left leg without hip drop, demonstrating improved glut strength, for 15 seconds or greater without UE support and no LOB  LEFS score improve to 20% functionally impaired or less  Patient to report ability to sleep 5 hours or greater without waking due to hip pain. Patient goals: \"Walk without walker\"     Pt. Education:  [x] Yes  [] No  [x] Reviewed Prior HEP/Ed  Method of Education: [x] Verbal  [x] Demo  [] Written  Comprehension of Education:  [x] Verbalizes understanding. [x] Demonstrates understanding. [] Needs review. [x] Demonstrates/verbalizes HEP/Ed previously given. Plan: [x] Continue current frequency toward long and short term goals. [x] Specific Instructions for subsequent treatments: see above    Frequency:  2 x/week for 20 visits (Therapist recommended three visits weekly and patient stated she would be comfortable with two visits weekly to begin, then will re-evaluate if she wants to come a third visit weekly).       Time In:    1292          Time Out:   4635    Electronically signed by:  Neha Soliz PTA

## 2022-11-21 ENCOUNTER — HOSPITAL ENCOUNTER (OUTPATIENT)
Dept: PHYSICAL THERAPY | Age: 57
Setting detail: THERAPIES SERIES
Discharge: HOME | End: 2022-11-21
Payer: MEDICARE

## 2022-11-21 NOTE — FLOWSHEET NOTE
[x] [facility]) - [facility] &  Therapy  [address]    [phone]  F: [phone]   [] [address]  [facility] 36   [address]  P: [phone]  F: [phone]  [] [address] &  Therapy  [address] [facility]  P: [phone]  F: [phone] [] [address]  P: [phone]  F: [phone]  [] [address]  [facility]   [address]   [place]: [phone]  F: [phone]   [] [place] [id number] [facility] [address]  [place]: [phone]   F: [phone]     Physical Therapy Cancel/No Show note    Date: 2022  Patient: Nataly Ng  : 1965  MRN: [id number]    Cancels/No Shows to date:     For today's appointment patient:    [x]  Cancelled    [] Rescheduled appointment    [] No-show     Reason given by patient:    [x]  Patient ill    []  Conflicting appointment    [] No transportation      [] Conflict with work    [] No reason given    [] Weather related    [] COVID-19    [] Other:      Comments:        [x] Next appointment was confirmed    Electronically signed by: Anastasia Elam, PTA

## 2022-11-22 ENCOUNTER — HOSPITAL ENCOUNTER (OUTPATIENT)
Dept: PHYSICAL THERAPY | Age: 57
Setting detail: THERAPIES SERIES
Discharge: HOME | End: 2022-11-22
Payer: MEDICARE

## 2022-11-22 NOTE — FLOWSHEET NOTE
[x] [facility]) - [facility] &  Therapy  [address]    [phone]  F: [phone]   [] [address]  [facility]   [address]  P: [phone]  F: [phone]  [] [address] &  Therapy  [address]  P: [phone]  F: [phone] [] [address]  P: [phone]  F: [phone]  [] [address]  [address] [address] [facility] [address]   [address]   [place]: [phone]  F: [phone]   [] [facility]  [address] [facility] [address]  [place]: [phone]   F: [phone]     Physical Therapy Cancel/No Show note    Date: 2022  Patient: Jeremías Damon  : 1965  MRN: [id number]    Cancels/No Shows to date:     For today's appointment patient:    [x]  Cancelled    [] Rescheduled appointment    [] No-show     Reason given by patient:    []  Patient ill    []  Conflicting appointment    [] No transportation      [] Conflict with work    [] No reason given    [] Weather related    [] COVID-19    [x] Other:      Comments:  death in family. Rescheduled pt as requested with phone call, pt will ck my chart for scheduled appts.        [] Next appointment was confirmed    Electronically signed by: Jalil Valenzuela PTA

## 2022-11-28 DIAGNOSIS — Z76.89 ENCOUNTER FOR WEIGHT MANAGEMENT: ICD-10-CM

## 2022-11-28 RX ORDER — PHENTERMINE HYDROCHLORIDE 37.5 MG/1
37.5 CAPSULE ORAL EVERY MORNING
Qty: 30 CAPSULE | Refills: 0 | OUTPATIENT
Start: 2022-11-28 | End: 2022-12-28

## 2022-11-28 NOTE — TELEPHONE ENCOUNTER
Last visit: 10/17/22  Last Med refill: 9/19/22  Does patient have enough medication for 72 hours: No:     Next Visit Date:  Future Appointments   Date Time Provider Dilia Alcantar   11/29/2022 12:00 PM Cozetta [place], PTA [facility]   12/2/2022 11:00 AM Cozetta [place], PTA [facility]   12/5/2022  9:15 AM Charly Alba MD [address] [facility]   12/19/2022 10:00 AM Beronica [facility]   12/27/2022 11:00 AM KOBY [place] - CNP [facility] [address] Maintenance   Topic Date Due    Annual Wellness Visit (AWV)  Never done    Flu vaccine (1) Never done    COVID-19 Vaccine (1) 04/03/2023 (Originally 1965)    DTaP/Tdap/Td vaccine (1 - Tdap) 04/13/2023 (Originally 5/1/1984)    Shingles vaccine (1 of 2) 04/13/2023 (Originally 5/1/2015)    Lipids  04/13/2023    Depression Monitoring  04/13/2023    Breast cancer screen  05/03/2024    Colorectal Cancer Screen  05/03/2025    Hepatitis C screen  Completed    HIV screen  Completed    Hepatitis A vaccine  Aged Out    Hib vaccine  Aged Out    Meningococcal (ACWY) vaccine  Aged Out    Pneumococcal 0-64 years Vaccine  Aged Out       Hemoglobin A1C (%)   Date Value   10/07/2022 5.2   04/13/2022 5.4             ( goal A1C is < 7)   No results found for: LABMICR  LDL Cholesterol (mg/dL)   Date Value   04/13/2022 118       (goal LDL is <100)   AST (U/L)   Date Value   10/07/2022 19     ALT (U/L)   Date Value   10/07/2022 14     BUN (mg/dL)   Date Value   10/07/2022 17     BP Readings from Last 3 Encounters:   11/01/22 106/65   10/17/22 121/78   09/28/22 110/74          (goal 120/80)    All Future Testing planned in [facility]  Lab Frequency Next Occurrence   EKG 12 Lead Once 10/20/2022               Patient Active Problem List:     Chronic bilateral low back pain with left-sided sciatica     Constipation     Numbness and tingling in both hands     Chronic bilateral low back pain with bilateral sciatica     Gastroesophageal reflux disease without esophagitis     Mixed hyperlipidemia     Hypercholesteremia     Class 1 obesity due to excess calories without serious comorbidity with body mass index (BMI) of 30.0 to 30.9 in adult     Arthritis of left hip     S/P total knee arthroplasty, left

## 2022-11-29 ENCOUNTER — HOSPITAL ENCOUNTER (OUTPATIENT)
Dept: PHYSICAL THERAPY | Age: 57
Setting detail: THERAPIES SERIES
Discharge: HOME | End: 2022-11-29
Payer: MEDICARE

## 2022-11-29 PROCEDURE — 97116 GAIT TRAINING THERAPY: CPT

## 2022-11-29 PROCEDURE — 97110 THERAPEUTIC EXERCISES: CPT

## 2022-11-29 NOTE — FLOWSHEET NOTE
[x] [facility]  [address]  [phone]  F: [phone] [] [address]  [place]   [address]  P: [phone]  F: [phone] [] [address]  [address]  P: [phone]  F: [phone] [] [address]  P: [phone]  F: [phone] [] [address]  [place]   [address]   Jeanne Aartilin: [phone]  F: [phone]      Physical Therapy Daily Treatment Note    Date:  2022  Patient Name:  Jonathan Rehman    :  1965  MRN: [id number]  Physician: Dr. Jacque Busch, DO                            Insurance: [facility], Medicaid. BMN  Medical Diagnosis: s/p surgery;arthritis of left hip                 Rehab Codes: M 25.552, M 25.652, M 62.81, R 26.29, Z 91.81, R 60.0  Onset date: 2022                         Next 's appt.: 2022  Visit# / total visits: ; Progress note for Medicare patient due at visit #10     Cancels/No Shows: 2/0    Subjective:    Pain:  [] Yes  [x] No Location:  left  hip/PAPO  Pain Rating: (0-10 scale) 0/10  left hip,   Pain altered Tx:  [x] No  [] Yes  Action:  Comments:  Pt requested to be discharged today, reports she is addressing HEP. Reports LB has been bothering her on and off since PAPO. Not using AD, intermittent antalgic gait demonstrated, ie when getting up from sitting in waiting room into clinic area today. Declines to use str. Cane when suggested.   .        Objective:  Modalities: vasocompression x 15 mins moderate, 40° left hip with wedge - HELD  Precautions: Anterior approach  Exercises:  Exercise   L PAPO 11.1.22 Reps/ Time Weight/ Level Comments Completed  2022    x   Sci fit 5 mins L2  x                  standing calf stretch 3x30\"  wedge     Heel raises 20x 1.5 lbs     march 2x10 2 lbs Incr. Sets  and wt 11/29 x   HS curls 15x 1.5 lbs L only    3 way hip  L OKC 15x 1.5 lbs      3 way hip    15x lime Seven 11/29 x   Step ups 15xea 6\" Fwd, lateral  only 11/29 x   Step downs 15x 4\" Incr. Reps 11/18    SLS 3x15\"  No UE support x   TG squats  2x10   L30       Cybex. Resistive walking-CGA 3xea  1.5 plates      Flight of stairs 10x1  Reciprocal gait ascending and descending, no HR by pt choice. supervision x   Gait in hallway up/down ramps  1500  ft  > leisure pacing, no device, reciprocal gait. One episode of mild limp noted with ascending incline ramp but resolved. Added 11/29 x   Sitting       LAQ 15x 1.5 lbs Added ball 11/18     HS stretch 3x20\"  Stool, instructed to address w/ HEP 11/18           Supine       Gluteal sets 15x5\"      Quad sets 15x5\"      HS sets       SAQ 15x 3 lbs Incr. Wt 11/18     Iso hip add 15x3\"      Heel slides 15x 3 lbs slider    Hip abd 15x  slider    bridging 2x10  Incr. Reps     SLR  1x10 AA Added 11/18    Piriformis stretch 3x15'               SL       clamshells 2x10 lime Added resistance 11/29 x   Hip abd 2x10 A Progressed to AROM 11/29 x          Prone        Hs curls  10x      Hip flexor stretch 3x30\"  Strap,                          Other:    11/29/22: L hip flexors 4-/5, HA/Quad/DF 5/5. L knee flexion sitting AROM 119°, supine 130° AAROM  LEFS 12% impairment. Specific Instructions for subsequent treatments:  Pt on hold from PT, to address HEP and call clinic 12/12/22, if no new concerns/issued will discharge.      Treatment Charges: Mins Units   []  Modalities     [x]  Ther Exercise  30 2   []  Manual Therapy     []  Ther Activities     []  Aquatics     [x]  Vasocompression  --    [x]  Other gt/stairs  8 1   Total Treatment time  38 3       Assessment: [x] Progressing toward goals added stair climbing and distance walking with including gait up/down ramps without device and no LOB or reports of increased hip pain. See all goal progress below as listed. Pt demonstrates and voiced good understanding of HEP. [] Other:  [x] Patient would continue to benefit from skilled physical therapy services in order to: decrease left hip pain, improve gait, improve strength, improve balance, improve sleep, and improve overall functional abilities     STG: (to be met in 10 treatments)  ? Pain: left hip pain improve to 4/10 at max when trying to sleep at night and during the day while being active 11/29/22   MET for hip.   ? ROM: Left knee flexion improve to 120 degrees 11/29/22 MET  ? Strength: Left LE strength improve to grossly 3+/5 throughout hip and 4-/5 grossly knee and ankle 11/29/22: MET, see above  ? Function: Patient to report ability to go up/down steps to her basement bedroom 11/29/22: MET  Patient to be independent with home exercise program as demonstrated by performance with correct form without cues. 11/29/22 MET  Demonstrate Knowledge of fall prevention 11/29/22 MET  LEFS score improve to 50% functionally impaired or less  LTG: (to be met in 20 treatments)  Left hip pain improve to 1/10 after being active a normal day for the patient 11/29/22: MET  Left LE strength improve to 4/5 without pain 11/29/22: 4-/5 progressing  Patient able to SLS on left leg without hip drop, demonstrating improved glut strength, for 15 seconds or greater without UE support and no LOB 11/29/22: MET  LEFS score improve to 20% functionally impaired or less 11/29/22 MET  Patient to report ability to sleep 5 hours or greater without waking due to hip pain. 11/29/22 MET                 Patient goals: Kady Esteban without walker\"     Pt. Education:  [x] Yes  [] No  [x] Reviewed Prior HEP/Ed  Method of Education: [x] Verbal  [x] Demo  [] Written  Comprehension of Education:  [x] Verbalizes understanding. [x] Demonstrates understanding. [] Needs review. [x] Demonstrates/verbalizes HEP/Ed previously given.      Plan: [x] Pt placed on hold with plan of pt to call clinic on 12/12/22 to report how HEP and community walking is going with possible plan of discharge if no complaints. (Pt requested to be discharged on 11/29/22) Goals addressed as listed above. Pt to follow up with ortho MD on 12/19/22 as scheduled.     Time In:   1202       Time Out:   2650     Electronically signed by:  Jalil Valenzuela PTA

## 2022-11-29 NOTE — DISCHARGE SUMMARY
[x] [facility] &  Therapy  [address]  [phone]  F: [phone] [] [address]  [place] [address]   [address]  P: [phone]  F: [phone] [] [place]  [address]  P: [phone]  F: [phone] [] [address]  P: [phone]  F: [phone] [] [address]  [facility]   [address]   [place]: [phone]  F: [phone]      Physical Therapy Discharge Note      Date: 2022      Patient: Cordelia Abad  : 1965  MRN: [id number]: Dr. Adelaide Romero,                             Insurance: Dara Barthel, Medicaid. BMN  Medical Diagnosis: s/p surgery;arthritis of left hip                 Rehab Codes: M 25.552, M 25.652, M 62.81, R 26.29, Z 91.81, R 60.0  Onset date: 2022                         Next 's appt.: 2022  Visit# / total visits: ; Progress note for Medicare patient due at visit #10                                Cancels/No Shows: 2/0  Date of initial visit: 2022                Date of final visit: 2022    Subjective:    Pain:  [] Yes  [x] No   Location:  left  hip/PAPO  Pain Rating: (0-10 scale) 0/10  left hip,   Pain altered Tx:  [x] No  [] Yes  Action:  Comments:  Pt requested to be discharged today, reports she is addressing HEP. Reports LB has been bothering her on and off since PAPO. Not using AD, intermittent antalgic gait demonstrated, ie when getting up from sitting in waiting room into clinic area today. Declines to use str. Cane when suggested. .     Objective:  Test Measurements/Function:  L hip flexors 4-/5, HA/Quad/DF 5/5. L knee flexion sitting AROM 119°, supine 130° AAROM  LEFS 12% impairment.      Assessment:  STG: (to be met in 10 treatments)  ? Pain: left hip pain improve to 4/10 at max when trying to sleep at night and during the day while being active 11/29/22   MET for hip.   ? ROM: Left knee flexion improve to 120 degrees 11/29/22 MET  ? Strength: Left LE strength improve to grossly 3+/5 throughout hip and 4-/5 grossly knee and ankle 11/29/22: MET, see above  ? Function: Patient to report ability to go up/down steps to her basement bedroom 11/29/22: MET  Patient to be independent with home exercise program as demonstrated by performance with correct form without cues. 11/29/22 MET  Demonstrate Knowledge of fall prevention 11/29/22 MET  LEFS score improve to 50% functionally impaired or less  LTG: (to be met in 20 treatments)  Left hip pain improve to 1/10 after being active a normal day for the patient 11/29/22: MET  Left LE strength improve to 4/5 without pain 11/29/22: 4-/5 progressing  Patient able to SLS on left leg without hip drop, demonstrating improved glut strength, for 15 seconds or greater without UE support and no LOB 11/29/22: MET  LEFS score improve to 20% functionally impaired or less 11/29/22 MET  Patient to report ability to sleep 5 hours or greater without waking due to hip pain. 11/29/22 MET                 Patient goals: Jilda Berclair without walker\"    Treatment to Date:  [x] Therapeutic Exercise    [] Modalities:  [] Therapeutic Activity    [] Ultrasound  [] Electrical Stimulation  [x] Gait Training     [] Massage       [] Lumbar/Cervical Traction  [] Neuromuscular Re-education [] Cold/hotpack [] Iontophoresis: 4 mg/mL  [x] Instruction in Home Exercise Program                     Dexamethasone Sodium  [] Manual Therapy             Phosphate 40-80 mAmin  [] Aquatic Therapy                   [x] Vasocompression/    [] Other:             Game Ready    Discharge Status:     [] Pt recovered from conditions. Treatment goals were met. [] Pt received maximum benefit. No further therapy indicated at this time.     [] Pt to continue exercise/home instructions independently. [] Therapy interrupted due to:    [] Pt has 2 or more no shows/cancels, is discontinued per our policy. [] Pt has completed prescribed number of treatment sessions. [x] Other:     11/29/2022: Pt placed on hold with plan of pt to call clinic on 12/12/22 to report how HEP and community walking is going with possible plan of discharge if no complaints. (Pt requested to be discharged on 11/29/22) Goals addressed as listed above. Pt to follow up with ortho MD on 12/19/22 as scheduled. Patient did not attend follow up with ortho clinic due to being ill. 12/27/2022: discharged. Electronically signed by Irma Nathan PT on 11/29/2022 at 2:36 PM      If you have any questions or concerns, please don't hesitate to call.   Thank you for your referral.

## 2022-12-05 ENCOUNTER — HOSPITAL ENCOUNTER (OUTPATIENT)
Age: 57
Setting detail: SPECIMEN
Discharge: HOME | End: 2022-12-05

## 2022-12-05 ENCOUNTER — OFFICE VISIT (OUTPATIENT)
Dept: FAMILY MEDICINE CLINIC | Age: 57
End: 2022-12-05
Payer: MEDICARE

## 2022-12-05 VITALS
HEART RATE: 71 BPM | WEIGHT: 162 LBS | BODY MASS INDEX: 28.7 KG/M2 | SYSTOLIC BLOOD PRESSURE: 105 MMHG | DIASTOLIC BLOOD PRESSURE: 67 MMHG

## 2022-12-05 DIAGNOSIS — K59.00 CONSTIPATION, UNSPECIFIED CONSTIPATION TYPE: ICD-10-CM

## 2022-12-05 DIAGNOSIS — K59.00 CONSTIPATION, UNSPECIFIED CONSTIPATION TYPE: Primary | ICD-10-CM

## 2022-12-05 DIAGNOSIS — Z12.11 SCREEN FOR COLON CANCER: ICD-10-CM

## 2022-12-05 LAB — VITAMIN D 25-HYDROXY: 25.5 NG/ML

## 2022-12-05 PROCEDURE — 99213 OFFICE O/P EST LOW 20 MIN: CPT | Performed by: STUDENT IN AN ORGANIZED HEALTH CARE EDUCATION/TRAINING PROGRAM

## 2022-12-05 RX ORDER — SENNA PLUS 8.6 MG/1
1 TABLET ORAL 2 TIMES DAILY
Qty: 60 TABLET | Refills: 11 | Status: SHIPPED | OUTPATIENT
Start: 2022-12-05 | End: 2023-12-05

## 2022-12-05 RX ORDER — POLYETHYLENE GLYCOL 3350 17 G/17G
17 POWDER, FOR SOLUTION ORAL 2 TIMES DAILY
Qty: 1530 G | Refills: 1 | Status: SHIPPED | OUTPATIENT
Start: 2022-12-05 | End: 2023-01-04

## 2022-12-05 NOTE — PROGRESS NOTES
[facility] Medicine Residency Program - Outpatient Note      Subjective:      Irene Gore is a 62 y.o. female  presented to the office on 12/05/22 with complaints of: Constipation and insomnia. This is a 71-year-old female with history of sleeve gastrectomy, bilateral salpingo-oophorectomy, history of inguinal/umbilical hernia, history of bladder suspension surgeries came in for weight management follow-up. She was prescribed phentermine 3 months ago, patient lost 15 pounds. Patient is willing to move lose further weight, patient also have restriction in eating habits and he usually eat ketogenic food. Denies any diarrhea or abdominal pain. Patient also endorses constipation which is getting worse after left hip total arthroplasty. Review of systems (Except what is mentioned in the HPI)  CONSTITUTIONAL: Negative  RESPIRATORY: Negative  CARDIOVASCULAR: Negative  GASTROINTESTINAL: Negative  GENITOURINARY: Negative   MUSCULOSKELETAL: Negative  NEUROLOGICAL: Negative  BEHAVIOR/PSYCH: Negative      Objective:      Vitals:    12/05/22 0918   BP: 105/67   Pulse: 71       General Appearance - Alert and oriented x 3  HEENT - No obvious deformity  Lungs - Bilateral good air entry , no wheezes or rales  Cardiovascular - Regular rate and rhythm. No murmur  Abdomen - Soft and nontender  Neurologic - No new focal motor or sensory deficits  Skin - No bruising or bleeding on exposed skin area  MSK - No new joint/bone pains   Psych - normal affect       Assessment:        ICD-10-CM    1. Screen for colon cancer  Z12.11       2. Constipation, unspecified constipation type  K59.00 polyethylene glycol (GLYCOLAX) 17 GM/SCOOP powder     senna (SENOKOT) 8.6 MG tablet     Vitamin D 25 Hydroxy          Plan:    Constipation likely secondary to dietary habits. TSH was unremarkable. Counseled the patient to incorporate more fiber in the diet, will start scheduled MiraLAX.   Counseled the patient if the MiraLAX is not helping she can use an ice. Patient might have underlying anxiety/depression, will reevaluate patient during next encounter. Cologuard test was unremarkable on 5/3/2022. Return in about 6 weeks (around 1/16/2023). Requested Prescriptions     Signed Prescriptions Disp Refills    polyethylene glycol (GLYCOLAX) 17 GM/SCOOP powder 1530 g 1     Sig: Take 17 g by mouth 2 times daily    senna (SENOKOT) 8.6 MG tablet 60 tablet 11     Sig: Take 1 tablet by mouth 2 times daily       Medications Discontinued During This Encounter   Medication Reason    GABAPENTIN PO LIST Chris Nina received counseling on the following healthy behaviors: nutrition, exercise and medication adherence    Discussed use, benefit, and side effects of prescribed medications. Barriers to medication compliance addressed. All patient questions answered. Pt voiced understanding. Disclaimer: Some oral of this note was transcribed using voice-recognition software. This may cause typographical errors occasionally. Although all effort is made to fix these errors, please do not hesitate to contact our office if there Joseluis Trejo concern with the understanding of this note.     Terrence Bruce  PGY-3  Family Medicine     12/5/2022  12:02 PM

## 2022-12-06 DIAGNOSIS — N39.0 URINARY TRACT INFECTION WITHOUT HEMATURIA, SITE UNSPECIFIED: ICD-10-CM

## 2022-12-06 RX ORDER — CIPROFLOXACIN 500 MG/1
TABLET, FILM COATED ORAL
Qty: 6 TABLET | Refills: 0 | OUTPATIENT
Start: 2022-12-06

## 2022-12-12 ENCOUNTER — TELEPHONE (OUTPATIENT)
Dept: FAMILY MEDICINE CLINIC | Age: 57
End: 2022-12-12

## 2022-12-12 NOTE — TELEPHONE ENCOUNTER
Patient stated she can not wait until the next appointment she is asking for medication to help with depression and sleeping, patient stated her bowel issue can wait she needs to deal with depression and sleeping right now.     Please advise

## 2022-12-13 NOTE — TELEPHONE ENCOUNTER
Patient called, patient stated \"I feel like this should have been discussed when I was there, I think I may need to find a new doctor since he's not doing what I want\". Patient proceeds to say she will get back with the office if she chooses to stay.

## 2022-12-27 RX ORDER — OMEPRAZOLE 10 MG/1
CAPSULE, DELAYED RELEASE ORAL
Qty: 30 CAPSULE | Refills: 1 | Status: SHIPPED | OUTPATIENT
Start: 2022-12-27

## 2022-12-27 NOTE — TELEPHONE ENCOUNTER
E-scribe request for med refill. Please review and e-scribe if applicable.      Last Visit Date:  12/05/2022  Next Visit Date:  1/17/2023    Hemoglobin A1C (%)   Date Value   10/07/2022 5.2   04/13/2022 5.4             ( goal A1C is < 7)   No results found for: LABMICR  LDL Cholesterol (mg/dL)   Date Value   04/13/2022 118       (goal LDL is <100)   AST (U/L)   Date Value   10/07/2022 19     ALT (U/L)   Date Value   10/07/2022 14     BUN (mg/dL)   Date Value   10/07/2022 17     BP Readings from Last 3 Encounters:   12/05/22 105/67   11/01/22 106/65   10/17/22 121/78          (goal 120/80)        Patient Active Problem List:     Chronic bilateral low back pain with left-sided sciatica     Constipation     Numbness and tingling in both hands     Chronic bilateral low back pain with bilateral sciatica     Gastroesophageal reflux disease without esophagitis     Mixed hyperlipidemia     Hypercholesteremia     Class 1 obesity due to excess calories without serious comorbidity with body mass index (BMI) of 30.0 to 30.9 in adult     Arthritis of left hip     S/P total knee arthroplasty, left     Screen for colon cancer      ----Kaitlyn Coombs

## 2023-01-02 DIAGNOSIS — M54.42 LUMBAGO WITH SCIATICA, LEFT SIDE: ICD-10-CM

## 2023-01-03 RX ORDER — CYCLOBENZAPRINE HCL 5 MG
TABLET ORAL
Qty: 28 TABLET | Refills: 0 | Status: SHIPPED | OUTPATIENT
Start: 2023-01-03

## 2023-01-04 PROBLEM — Z12.11 SCREEN FOR COLON CANCER: Status: RESOLVED | Noted: 2022-12-05 | Resolved: 2023-01-04

## 2023-01-17 ENCOUNTER — OFFICE VISIT (OUTPATIENT)
Dept: FAMILY MEDICINE CLINIC | Age: 58
End: 2023-01-17
Payer: MEDICARE

## 2023-01-17 VITALS
SYSTOLIC BLOOD PRESSURE: 114 MMHG | BODY MASS INDEX: 29.23 KG/M2 | HEART RATE: 76 BPM | WEIGHT: 165 LBS | DIASTOLIC BLOOD PRESSURE: 72 MMHG

## 2023-01-17 DIAGNOSIS — F99 INSOMNIA DUE TO OTHER MENTAL DISORDER: ICD-10-CM

## 2023-01-17 DIAGNOSIS — F51.05 INSOMNIA DUE TO OTHER MENTAL DISORDER: ICD-10-CM

## 2023-01-17 DIAGNOSIS — D64.9 ANEMIA, UNSPECIFIED TYPE: ICD-10-CM

## 2023-01-17 DIAGNOSIS — F32.9 MAJOR DEPRESSIVE DISORDER WITH SINGLE EPISODE, REMISSION STATUS UNSPECIFIED: Primary | ICD-10-CM

## 2023-01-17 DIAGNOSIS — E55.9 VITAMIN D DEFICIENCY: ICD-10-CM

## 2023-01-17 PROBLEM — F39 MOOD DISORDER (HCC): Status: ACTIVE | Noted: 2023-01-17

## 2023-01-17 PROCEDURE — 99213 OFFICE O/P EST LOW 20 MIN: CPT | Performed by: STUDENT IN AN ORGANIZED HEALTH CARE EDUCATION/TRAINING PROGRAM

## 2023-01-17 RX ORDER — ERGOCALCIFEROL 1.25 MG/1
50000 CAPSULE ORAL WEEKLY
Qty: 12 CAPSULE | Refills: 0 | Status: SHIPPED | OUTPATIENT
Start: 2023-01-17

## 2023-01-17 RX ORDER — FLUOXETINE 10 MG/1
10 CAPSULE ORAL DAILY
Qty: 30 CAPSULE | Refills: 3 | Status: SHIPPED | OUTPATIENT
Start: 2023-01-17

## 2023-01-17 ASSESSMENT — PATIENT HEALTH QUESTIONNAIRE - PHQ9
SUM OF ALL RESPONSES TO PHQ QUESTIONS 1-9: 14
6. FEELING BAD ABOUT YOURSELF - OR THAT YOU ARE A FAILURE OR HAVE LET YOURSELF OR YOUR FAMILY DOWN: 2
10. IF YOU CHECKED OFF ANY PROBLEMS, HOW DIFFICULT HAVE THESE PROBLEMS MADE IT FOR YOU TO DO YOUR WORK, TAKE CARE OF THINGS AT HOME, OR GET ALONG WITH OTHER PEOPLE: 1
SUM OF ALL RESPONSES TO PHQ QUESTIONS 1-9: 14
8. MOVING OR SPEAKING SO SLOWLY THAT OTHER PEOPLE COULD HAVE NOTICED. OR THE OPPOSITE, BEING SO FIGETY OR RESTLESS THAT YOU HAVE BEEN MOVING AROUND A LOT MORE THAN USUAL: 2
2. FEELING DOWN, DEPRESSED OR HOPELESS: 2
SUM OF ALL RESPONSES TO PHQ QUESTIONS 1-9: 14
SUM OF ALL RESPONSES TO PHQ QUESTIONS 1-9: 14
5. POOR APPETITE OR OVEREATING: 2
3. TROUBLE FALLING OR STAYING ASLEEP: 2
4. FEELING TIRED OR HAVING LITTLE ENERGY: 2
7. TROUBLE CONCENTRATING ON THINGS, SUCH AS READING THE NEWSPAPER OR WATCHING TELEVISION: 2

## 2023-01-17 NOTE — PROGRESS NOTES
Attending Physician Statement  I have discussed the care of Chanda Mclean, 62 y.o. female,including pertinent history and exam findings,  with the resident Dr. Mike Miller MD.  History:  Chief Complaint   Patient presents with    1 Month Follow-Up       Discuss medications, and abd problems      Patient is here for follow up on depression. I have reviewed the key elements of the encounter with the resident. Examination was done by resident as documented in residents note. BP Readings from Last 3 Encounters:   01/17/23 114/72   12/05/22 105/67   11/01/22 106/65     /72   Pulse 76   Wt 165 lb (74.8 kg)   BMI 29.23 kg/m²   Lab Results   Component Value Date    WBC 6.4 10/07/2022    HGB 10.7 (L) 11/01/2022    HCT 34.6 (L) 11/01/2022     10/07/2022    CHOL 211 (H) 04/13/2022    TRIG 153 (H) 04/13/2022    HDL 62 04/13/2022    ALT 14 10/07/2022    AST 19 10/07/2022     10/07/2022    K 4.1 10/07/2022     10/07/2022    CREATININE 0.67 10/07/2022    BUN 17 10/07/2022    CO2 27 10/07/2022    TSH 1.68 04/13/2022    LABA1C 5.2 10/07/2022     Lab Results   Component Value Date    CALCIUM 9.3 10/07/2022     Lab Results   Component Value Date    LDLCHOLESTEROL 118 04/13/2022     I agree with the assessment, plan and diagnosis of    Diagnosis Orders   1. Major depressive disorder with single episode, remission status unspecified  FLUoxetine (PROZAC) 10 MG capsule      2. Anemia, unspecified type  Iron and TIBC      3. Vitamin D deficiency  vitamin D (ERGOCALCIFEROL) 1.25 MG (79828 UT) CAPS capsule        I agree with  orders as documented by the resident. Recommendations:   Patient was counseled, screening for depression is positive and she was started on Prozac. She was advised to return in short term for recheck. Return in about 4 weeks (around 2/14/2023).    (Beth Gutierrez ) Dr. Joseline Calabrese MD

## 2023-01-17 NOTE — PROGRESS NOTES
[facility]    Family Medicine Residency Program - Outpatient Note      Subjective:      Aly Howard is a 62 y.o. female  presented to the office on 01/17/23 with complaints of:   Evaluation of depression  This is a 71-year-old female with history of sleeve gastrectomy, bilateral salpingo-oophorectomy, history of inguinal/umbilical hernia, history of bladder suspension surgeries. Today patient is accompanied by her daughter, patient is having troubles in concentration, sleep, appetite, weight, overall being depressed and feels more dependent as she is getting older. Patient did mention that she has lost interest in doing activities including getting dressed, doing dishes, going for a walk, starting crying all of a sudden and feeling as a failure. She did mention that she also has problems staying asleep which is also contributing towards current symptoms. Patient stated that she is  and  but she still meets the  but lately with her ongoing conditions and symptoms she is feeling more dependent on the daughter. Daughter did mention that patient is experiencing symptoms mentioned above. Patient is not suicidal during this encounter. Review of systems (Except what is mentioned in the HPI)  CONSTITUTIONAL: Negative  RESPIRATORY: Negative  CARDIOVASCULAR: Negative  GASTROINTESTINAL: Negative  GENITOURINARY: Negative   MUSCULOSKELETAL: Negative  NEUROLOGICAL: Negative  BEHAVIOR/PSYCH: Negative      Objective:      Vitals:    01/17/23 0922   BP: 114/72   Pulse: 76       General Appearance - Alert and oriented x 3  HEENT - No obvious deformity  Lungs - Bilateral good air entry , no wheezes or rales  Cardiovascular - Regular rate and rhythm.  No murmur  Abdomen - Soft and nontender  Neurologic - No new focal motor or sensory deficits  Skin - No bruising or bleeding on exposed skin area  MSK - No new joint/bone pains   Psych - normal affect       Assessment: ICD-10-CM    1. Major depressive disorder with single episode, remission status unspecified  F32.9       2. Anemia, unspecified type  D64.9 Iron and TIBC      3. Vitamin D deficiency  E55.9 vitamin D (ERGOCALCIFEROL) 1.25 MG (87236 UT) CAPS capsule          Plan:    No thyroid dysfunction, slight anemia we will order iron/TIBC. Vitamin D deficiency contributing towards symptoms. Offered patient psychologist during this encounter she is agreeable with the plan. Provided patient with depression details and medication that she can use. We will start patient (Prozac) and atarax 25 mg at night to help sleep and anxiety attacks. Reassess in 4 weeks for further intervention. PHQ-9 is 15. Mood questionnaire is done patient does not qualify for bipolar disorder, questionnaire is also put in the system. HLD with ASCVD of 1.9 %, no aspirin indicated, will recheck cholesterol in 4 months. Return in about 4 weeks (around 2/14/2023). Requested Prescriptions     Signed Prescriptions Disp Refills    vitamin D (ERGOCALCIFEROL) 1.25 MG (51471 UT) CAPS capsule 12 capsule 0     Sig: Take 1 capsule by mouth once a week       Medications Discontinued During This Encounter   Medication Reason    aspirin EC 81 MG EC tablet LIST CLEANUP       Otelia  received counseling on the following healthy behaviors: nutrition, exercise and medication adherence    Discussed use, benefit, and side effects of prescribed medications. Barriers to medication compliance addressed. All patient questions answered. Pt voiced understanding. Disclaimer: Some oral of this note was transcribed using voice-recognition software. This may cause typographical errors occasionally. Although all effort is made to fix these errors, please do not hesitate to contact our office if there Lencho Goodness concern with the understanding of this note.     Terrence Joseph  PGY-3  Family Medicine     1/17/2023  10:05 AM

## 2023-01-18 ENCOUNTER — TELEPHONE (OUTPATIENT)
Dept: FAMILY MEDICINE CLINIC | Age: 58
End: 2023-01-18

## 2023-01-18 RX ORDER — HYDROXYZINE HYDROCHLORIDE 25 MG/1
25 TABLET, FILM COATED ORAL NIGHTLY PRN
Qty: 30 TABLET | Refills: 0 | Status: SHIPPED | OUTPATIENT
Start: 2023-01-18 | End: 2023-02-17

## 2023-01-18 NOTE — TELEPHONE ENCOUNTER
Patient called office stating that you were going to send in a med to her pharmacy that would help with anxiety as well as sleep, I did not see anything in your note, please advise, thanks.

## 2023-01-23 ENCOUNTER — TELEPHONE (OUTPATIENT)
Dept: FAMILY MEDICINE CLINIC | Age: 58
End: 2023-01-23

## 2023-01-23 NOTE — TELEPHONE ENCOUNTER
----- Message from Manisha Josr sent at 1/23/2023 11:37 AM EST -----  Subject: Message to Provider    QUESTIONS  Information for Provider? Pt is calling in wanting to change providers in   the office. Pt states she does not know the providers name that she wants   to change to. Only information the pt has is that she is a blonde female.   Please advise.   ---------------------------------------------------------------------------  --------------  CALL BACK INFO  [phone]; OK to leave message on voicemail  ---------------------------------------------------------------------------  --------------  SCRIPT ANSWERS  Relationship to Patient? Self

## 2023-01-27 DIAGNOSIS — F51.05 INSOMNIA DUE TO OTHER MENTAL DISORDER: ICD-10-CM

## 2023-01-27 DIAGNOSIS — F99 INSOMNIA DUE TO OTHER MENTAL DISORDER: ICD-10-CM

## 2023-01-27 RX ORDER — HYDROXYZINE HYDROCHLORIDE 25 MG/1
25 TABLET, FILM COATED ORAL NIGHTLY PRN
Qty: 30 TABLET | Refills: 0 | OUTPATIENT
Start: 2023-01-27 | End: 2023-02-26

## 2023-01-27 NOTE — TELEPHONE ENCOUNTER
Hydroxyzine pending refill        Health Maintenance   Topic Date Due    Annual Wellness Visit (AWV)  Never done    Flu vaccine (1) Never done    COVID-19 Vaccine (1) 04/03/2023 (Originally 1965)    DTaP/Tdap/Td vaccine (1 - Tdap) 04/13/2023 (Originally 5/1/1984)    Shingles vaccine (1 of 2) 04/13/2023 (Originally 5/1/2015)    Lipids  04/13/2023    Depression Monitoring  01/17/2024    Breast cancer screen  05/03/2024    Colorectal Cancer Screen  05/03/2025    Hepatitis C screen  Completed    HIV screen  Completed    Hepatitis A vaccine  Aged Out    Hib vaccine  Aged Out    Meningococcal (ACWY) vaccine  Aged Out    Pneumococcal 0-64 years Vaccine  Aged Out             (applicable per patient's age: Cancer Screenings, Depression Screening, Fall Risk Screening, Immunizations)    Hemoglobin A1C (%)   Date Value   10/07/2022 5.2   04/13/2022 5.4     LDL Cholesterol (mg/dL)   Date Value   04/13/2022 118     AST (U/L)   Date Value   10/07/2022 19     ALT (U/L)   Date Value   10/07/2022 14     BUN (mg/dL)   Date Value   10/07/2022 17      (goal A1C is < 7)   (goal LDL is <100) need 30-50% reduction from baseline     BP Readings from Last 3 Encounters:   01/17/23 114/72   12/05/22 105/67   11/01/22 106/65    (goal /80)      All Future Testing planned in [facility]:  Lab Frequency Next Occurrence   EKG 12 Lead Once 10/20/2022   Iron and TIBC Once 01/17/2023       Next Visit Date:  Future Appointments   Date Time Provider Dilia Alcantar   2/9/2023  2:00 PM KOBY Tobar - CNP [facility]   2/24/2023  1:30 PM Damon Buchanan MD [address] [facility]            Patient Active Problem List:     Chronic bilateral low back pain with left-sided sciatica     Constipation     Numbness and tingling in both hands     Chronic bilateral low back pain with bilateral sciatica     Gastroesophageal reflux disease without esophagitis     Mixed hyperlipidemia     Hypercholesteremia     Class 1 obesity due to excess calories

## 2023-02-08 DIAGNOSIS — M54.42 LUMBAGO WITH SCIATICA, LEFT SIDE: ICD-10-CM

## 2023-02-08 RX ORDER — CYCLOBENZAPRINE HCL 5 MG
TABLET ORAL
Qty: 28 TABLET | Refills: 0 | Status: SHIPPED | OUTPATIENT
Start: 2023-02-08

## 2023-02-08 NOTE — TELEPHONE ENCOUNTER
E-scribe requesting refill for Flexeril. Please review and e-scribe if applicable.      Last Visit Date: 9/28/2022    Next Visit Date:  Future Appointments   Date Time Provider Dilia Katharine   2/9/2023  2:00 PM Phyllistine Kawasaki, APRN - CNP [facility]   2/24/2023  1:30 PM Carlos Hutson MD [address] [facility]

## 2023-02-09 ENCOUNTER — OFFICE VISIT (OUTPATIENT)
Dept: NEUROSURGERY | Age: 58
End: 2023-02-09
Payer: MEDICARE

## 2023-02-09 VITALS
TEMPERATURE: 97.4 F | OXYGEN SATURATION: 97 % | WEIGHT: 165 LBS | DIASTOLIC BLOOD PRESSURE: 72 MMHG | HEIGHT: 63 IN | SYSTOLIC BLOOD PRESSURE: 105 MMHG | HEART RATE: 78 BPM | BODY MASS INDEX: 29.23 KG/M2

## 2023-02-09 DIAGNOSIS — M47.816 LUMBAR SPONDYLOSIS: ICD-10-CM

## 2023-02-09 DIAGNOSIS — R29.6 FREQUENT FALLS: Primary | ICD-10-CM

## 2023-02-09 DIAGNOSIS — R29.2: ICD-10-CM

## 2023-02-09 PROCEDURE — 99214 OFFICE O/P EST MOD 30 MIN: CPT | Performed by: NURSE PRACTITIONER

## 2023-02-09 NOTE — PROGRESS NOTES
[address]  [facility] [address], [address] [facility] [address]  Dept: [phone]    Patient:  Wilner Sheppard  YOB: 1965  Date: 2/9/23    The patient is a 62 y.o. female who presents today for consult of the following problems:     Chief Complaint   Patient presents with    Other    Neurologic Problem         HPI:     Wilner Sheppard is a 62 y.o. female who presents for follow up of low back pain. Recently underwent left hip replacement, has been recovering well from this. Has been having issues with low back spasms, this will sometimes lead to abrupt onset diffuse numbness, tingling and weakness to legs. Would lead to a fall if she is unable to sit down. Lasts for around 5-10 minutes, typically occurs with standing/walking. Happened while trying to climb the stairs, had to have her children help her to a seated position. Pain to back is 5/10 on average, fairly tolerable. Will have occasional numbness/tingling to arms, left more than right. Has noticed some issues with dropping objects, difficulty fastening jewelry, trouble with handwriting. With certain cervical movements, she will feel pulling in her lower back. Denies saddle anesthesia, loss of control of bowels or bladder.     History:     Past Medical History:   Diagnosis Date    Arthritis     Heartburn     Hyperlipidemia      Past Surgical History:   Procedure Laterality Date    BLADDER SUSPENSION      x3    CARPAL TUNNEL RELEASE Bilateral     COLONOSCOPY      ENDOSCOPY, COLON, DIAGNOSTIC      HERNIA REPAIR      umbilical    HYSTERECTOMY (CERVIX STATUS UNKNOWN)      OVARY REMOVAL      STOMACH SURGERY      Gastric Sleeve    TOTAL HIP ARTHROPLASTY Left 11/1/2022    LEFT HIP TOTAL ARTHROPLASTY ANTERIOR APPROACH - MEDACTA performed by Kat Pastrana DO at [facility] OR     Family History   Problem Relation Age of Onset    Asthma Mother     Other Mother         COPD    Cancer Father         Lung    Substance Abuse Sister         Clean for a year now    Obesity Sister     No Known Problems Brother     No Known Problems Brother      Current Outpatient Medications on File Prior to Visit   Medication Sig Dispense Refill    cyclobenzaprine (FLEXERIL) 5 MG tablet TAKE 1 TABLET BY MOUTH NIGHTLY AS NEEDED FOR MUSCLE SPASMS. 28 tablet 0    hydrOXYzine HCl (ATARAX) 25 MG tablet Take 1 tablet by mouth nightly as needed for Anxiety 30 tablet 0    vitamin D (ERGOCALCIFEROL) 1.25 MG (76660 UT) CAPS capsule Take 1 capsule by mouth once a week 12 capsule 0    FLUoxetine (PROZAC) 10 MG capsule Take 1 capsule by mouth daily 30 capsule 3    omeprazole (PRILOSEC) 10 MG delayed release capsule TAKE 1 CAPSULE BY MOUTH EVERY DAY 30 capsule 1    senna (SENOKOT) 8.6 MG tablet Take 1 tablet by mouth 2 times daily 60 tablet 11    atorvastatin (LIPITOR) 20 MG tablet TAKE 1 TABLET BY MOUTH EVERY  tablet 1    docusate sodium (COLACE) 100 MG capsule Take 1 capsule by mouth 2 times daily as needed for Constipation 60 capsule 0    Handicap Placard MISC by Does not apply route Will be valid for 5 year 1 each 0     No current facility-administered medications on file prior to visit. Social History     Tobacco Use    Smoking status: Former     Packs/day: 1.00     Years: 23.00     Pack years: 23.00     Types: Cigarettes     Quit date: 2000     Years since quittin.1    Smokeless tobacco: Never   Vaping Use    Vaping Use: Former    Substances: Nicotine    Devices: Pre-filled or refillable cartridge   Substance Use Topics    Alcohol use: Not Currently     Comment: rarely    Drug use: Never       Allergies   Allergen Reactions    Penicillins Nausea Only     Nauseous        Review of Systems  Constitutional: Negative for activity change and appetite change. HENT: Negative for ear pain and facial swelling. Eyes: Negative for discharge and itching. Respiratory: Negative for choking and chest tightness. Cardiovascular: Negative for chest pain and leg swelling. Gastrointestinal: Negative for nausea and abdominal pain. Endocrine: Negative for cold intolerance and heat intolerance. Genitourinary: Negative for frequency and flank pain. Musculoskeletal: Negative for myalgias and joint swelling. Skin: Negative for rash and wound. Allergic/Immunologic: Negative for environmental allergies and food allergies. Hematological: Negative for adenopathy. Does not bruise/bleed easily. Psychiatric/Behavioral: Negative for self-injury. The patient is not nervous/anxious. Physical Exam:      /72 (Site: Right Upper Arm, Position: Sitting, Cuff Size: Large Adult)   Pulse 78   Temp 97.4 °F (36.3 °C) (Temporal)   Ht 5' 3\" (1.6 m)   Wt 165 lb (74.8 kg)   SpO2 97%   BMI 29.23 kg/m²   Estimated body mass index is 29.23 kg/m² as calculated from the following:    Height as of this encounter: 5' 3\" (1.6 m). Weight as of this encounter: 165 lb (74.8 kg). General:  Wilner Sheppard is a 62y.o. year old female who appears her stated age. HEENT: Normocephalic atraumatic. Neck supple. Chest: regular rate; pulses equal  Abdomen: Soft nontender nondistended.   Ext: DP and PT pulses 2+, good cap refill  Neuro    Mentation  Appropriate affect  Registration intact  Orientation intact  Judgement intact to situation    Cranial Nerves:   Pupils equal and reactive to light  Extraocular motion intact  Face and shrug symmetric  Tongue midline  No dysarthria  v1-3 sensation symmetric, masseter tone symmetric  Hearing symmetric    Sensation: Intact on exam    Motor  L deltoid 5/5; R deltoid 5/5  L biceps 5/5; R biceps 5/5  L triceps 5/5; R triceps 5/5  L wrist extension 5/5; R wrist extension 5/5  L intrinsics 5/5; R intrinsics 5/5     L iliopsoas 5/5 , R iliopsoas 5/5  L quadriceps 5/5; R quadriceps 5/5  L Dorsiflexion 5/5; R dorsiflexion 5/5  L Plantarflexion 5/5; R plantarflexion 5/5  L EHL 5/5; R EHL 5/5    Reflexes  L Brachioradialis 2+/4; R brachioradialis 2+/4  L Biceps 3+/4; R Biceps 3+/4  L Triceps 2+/4; R Triceps 2+/4  L Patellar 2+/4: R Patellar 2+/4  L Achilles 2+/4; R Achilles 2+/4    hoffmans L: pos  hoffmans R: pos  Clonus L: neg  Clonus R: neg  Babinski L: neg  Babinski R: neg    Negative Spurling's  Positive Lhermitte's    Studies Review:     MRI lumbar 6/23/2022:  FINDINGS:   BONES/ALIGNMENT: Vertebral body heights are maintained. There is   age-appropriate bone marrow signal.  There is multilevel degenerative disc   disease with loss of disc signal.  There is no significant spondylolisthesis. SPINAL CORD: Conus is normal in caliber and signal and terminates at the L2   level. The cauda equina is unremarkable. SOFT TISSUES: Posterior paraspinal soft tissues are unremarkable. The   visualized abdominal structures are unremarkable. L1-L2: There is a circumferential disc bulge. There is no canal stenosis. There is mild foraminal narrowing. L2-L3: There is a circumferential disc bulge. There is no canal stenosis. There is mild foraminal narrowing. L3-L4: There is a circumferential disc bulge with facet and ligamentous   hypertrophy. There is canal stenosis measuring 7 mm in AP dimension. There   is mild right foraminal narrowing and no significant left foraminal narrowing. L4-L5: There is a circumferential disc bulge with facet and ligamentous   hypertrophy. There is canal stenosis measuring 6 mm in AP dimension. There   is no significant foraminal narrowing. L5-S1: There is a circumferential disc bulge. There is no canal stenosis or   right foraminal narrowing. There is mild left foraminal narrowing. Orthopedic notes reviewed  Physical therapy notes reviewed    Assessment and Plan:      1. Frequent falls    2. Fregoso's reflex    3.  Lumbar spondylosis          Plan: Patient still recovering from recent left hip replacement. Has had some persistent issues with sudden onset of low back pain with diffuse numbness tingling and weakness to lower extremities requiring the patient to sit down otherwise will fall. Also endorses progressive issues with dropping objects, numbness and tingling intermittently to hands, difficulty with dexterity as well as handwriting. Does feel lower back symptoms with certain cervical movements. Recommend obtaining cervical MRI to rule out any cord compression contributing to symptoms. We will have the patient return for evaluation with surgeon after MRI for additional recommendations. Followup: Return in about 6 weeks (around 3/23/2023), or if symptoms worsen or fail to improve. Prescriptions Ordered:  No orders of the defined types were placed in this encounter. Orders Placed:  Orders Placed This Encounter   Procedures    MRI CERVICAL SPINE WO CONTRAST     Standing Status:   Future     Standing Expiration Date:   2/9/2024     Order Specific Question:   Reason for exam:     Answer:   r/o cord compression     Order Specific Question:   What is the sedation requirement? Answer:   None        Electronically signed by KOBY Yuan CNP on 2/9/2023 at 2:33 PM    Please note that this chart was generated using voice recognition Dragon dictation software. Although every effort was made to ensure the accuracy of this automated transcription, some errors in transcription may have occurred.

## 2023-02-10 DIAGNOSIS — F32.9 MAJOR DEPRESSIVE DISORDER WITH SINGLE EPISODE, REMISSION STATUS UNSPECIFIED: ICD-10-CM

## 2023-02-10 RX ORDER — FLUOXETINE 10 MG/1
CAPSULE ORAL
Qty: 90 CAPSULE | Refills: 1 | Status: SHIPPED | OUTPATIENT
Start: 2023-02-10

## 2023-02-10 NOTE — TELEPHONE ENCOUNTER
E-scribe request for med refill. Please review and e-scribe if applicable.      Last Visit Date:  01/17/2023  Next Visit Date:  2/24/2023    Hemoglobin A1C (%)   Date Value   10/07/2022 5.2   04/13/2022 5.4             ( goal A1C is < 7)   No results found for: LABMICR  LDL Cholesterol (mg/dL)   Date Value   04/13/2022 118       (goal LDL is <100)   AST (U/L)   Date Value   10/07/2022 19     ALT (U/L)   Date Value   10/07/2022 14     BUN (mg/dL)   Date Value   10/07/2022 17     BP Readings from Last 3 Encounters:   02/09/23 105/72   01/17/23 114/72   12/05/22 105/67          (goal 120/80)        Patient Active Problem List:     Chronic bilateral low back pain with left-sided sciatica     Constipation     Numbness and tingling in both hands     Chronic bilateral low back pain with bilateral sciatica     Gastroesophageal reflux disease without esophagitis     Mixed hyperlipidemia     Hypercholesteremia     Class 1 obesity due to excess calories without serious comorbidity with body mass index (BMI) of 30.0 to 30.9 in adult     Arthritis of left hip     S/P total knee arthroplasty, left     Mood disorder ([facility] [address]     Major depressive disorder with single episode     Anemia     Vitamin D deficiency      ----Nacho Kaur

## 2023-02-23 ENCOUNTER — HOSPITAL ENCOUNTER (OUTPATIENT)
Dept: MRI IMAGING | Age: 58
Discharge: HOME | End: 2023-02-25
Payer: MEDICARE

## 2023-02-23 DIAGNOSIS — R29.6 FREQUENT FALLS: ICD-10-CM

## 2023-02-23 DIAGNOSIS — R29.2: ICD-10-CM

## 2023-02-23 PROCEDURE — 72141 MRI NECK SPINE W/O DYE: CPT

## 2023-02-24 ENCOUNTER — OFFICE VISIT (OUTPATIENT)
Dept: FAMILY MEDICINE CLINIC | Age: 58
End: 2023-02-24

## 2023-02-24 VITALS
DIASTOLIC BLOOD PRESSURE: 70 MMHG | BODY MASS INDEX: 30.29 KG/M2 | SYSTOLIC BLOOD PRESSURE: 113 MMHG | HEART RATE: 81 BPM | WEIGHT: 171 LBS

## 2023-02-24 DIAGNOSIS — F32.9 MAJOR DEPRESSIVE DISORDER WITH SINGLE EPISODE, REMISSION STATUS UNSPECIFIED: ICD-10-CM

## 2023-02-24 DIAGNOSIS — R63.5 WEIGHT GAIN: ICD-10-CM

## 2023-02-24 RX ORDER — FLUOXETINE HYDROCHLORIDE 20 MG/1
20 CAPSULE ORAL DAILY
Qty: 30 CAPSULE | Refills: 3 | Status: SHIPPED | OUTPATIENT
Start: 2023-02-24

## 2023-02-24 RX ORDER — ORLISTAT 120 MG/1
120 CAPSULE ORAL
Qty: 90 CAPSULE | Refills: 0 | Status: SHIPPED | OUTPATIENT
Start: 2023-02-24 | End: 2023-03-26

## 2023-02-24 SDOH — ECONOMIC STABILITY: FOOD INSECURITY: WITHIN THE PAST 12 MONTHS, THE FOOD YOU BOUGHT JUST DIDN'T LAST AND YOU DIDN'T HAVE MONEY TO GET MORE.: NEVER TRUE

## 2023-02-24 SDOH — ECONOMIC STABILITY: FOOD INSECURITY: WITHIN THE PAST 12 MONTHS, YOU WORRIED THAT YOUR FOOD WOULD RUN OUT BEFORE YOU GOT MONEY TO BUY MORE.: NEVER TRUE

## 2023-02-24 SDOH — ECONOMIC STABILITY: HOUSING INSECURITY
IN THE LAST 12 MONTHS, WAS THERE A TIME WHEN YOU DID NOT HAVE A STEADY PLACE TO SLEEP OR SLEPT IN A SHELTER (INCLUDING NOW)?: NO

## 2023-02-24 SDOH — ECONOMIC STABILITY: INCOME INSECURITY: HOW HARD IS IT FOR YOU TO PAY FOR THE VERY BASICS LIKE FOOD, HOUSING, MEDICAL CARE, AND HEATING?: NOT VERY HARD

## 2023-02-24 ASSESSMENT — ENCOUNTER SYMPTOMS
ABDOMINAL PAIN: 0
VOMITING: 0
DIARRHEA: 0
CHEST TIGHTNESS: 0
NAUSEA: 0

## 2023-02-24 NOTE — PROGRESS NOTES
Attending Physician Statement  I have discussed the care of Sincere He, including pertinent history and exam findings,  with the resident. I have reviewed the key elements of all parts of the encounter with the resident. I agree with the assessment, plan and orders as documented by the resident.   (Mery De Souza)    Ileana Juarez MD

## 2023-02-24 NOTE — PROGRESS NOTES
[address] [facility] - Outpatient Note      Subjective:    Nataly Ng is a 62 y.o. female with  has a past medical history of Arthritis, Heartburn, and Hyperlipidemia. Presented to the office today for:  Chief Complaint   Patient presents with    Medication Check     Last physician put her on Prozac and wants to discuss weight loss meds again due to new meds making her gain weight back          HPI    Patient is a 62year old female presenting to the clinic to discuss medication. Patient is currently on Prozac for depression but is concerned it is causing her to gain weight, she says the meds are helping her mood a bit but she has multiple stressors at home. Discussed with patient that Prozac does not cause weight gain, upon further questioning, patient does mention she snacks on a lot of junk food at night, but throughout the day she eats well and goes on exercise bike for 1 hour per day but still claims she is gaining weight, about 15 lbs in the past 2 months (chart shows 9 lbs in 3 months), weight gain per patient has been going on for past 2 years, TSH has been checked and was wnl. Patient requesting Phentermine, as she was previously prescribed this for a few months which she felt helped. Did have vertical sleeve gastrectomy about 10 years ago. Review of Systems   Constitutional:  Negative for chills and fever. Weight gain   Respiratory:  Negative for chest tightness. Cardiovascular:  Negative for chest pain and palpitations. Gastrointestinal:  Negative for abdominal pain, diarrhea, nausea and vomiting. Genitourinary:  Negative for difficulty urinating. Neurological:  Negative for dizziness and headaches.      The patient has a   Family History   Problem Relation Age of Onset    Asthma Mother     Other Mother         COPD    Cancer Father         Lung    Substance Abuse Sister         Clean for a year now    Obesity Sister     No Known Problems Brother     No Known Problems Brother        Objective:    /70   Pulse 81   Wt 171 lb (77.6 kg)   BMI 30.29 kg/m²    BP Readings from Last 3 Encounters:   02/24/23 113/70   02/09/23 105/72   01/17/23 114/72       Physical Exam  Constitutional:       Appearance: She is obese. Cardiovascular:      Rate and Rhythm: Normal rate and regular rhythm. Pulses: Normal pulses. Heart sounds: Normal heart sounds. No murmur heard. No gallop. Pulmonary:      Effort: Pulmonary effort is normal.      Breath sounds: Normal breath sounds. No wheezing, rhonchi or rales. Musculoskeletal:      Right lower leg: No edema. Left lower leg: No edema. Skin:     General: Skin is warm. Neurological:      General: No focal deficit present. Mental Status: She is alert. Psychiatric:         Mood and Affect: Mood normal.       Lab Results   Component Value Date    WBC 6.4 10/07/2022    HGB 10.7 (L) 11/01/2022    HCT 34.6 (L) 11/01/2022     10/07/2022    CHOL 211 (H) 04/13/2022    TRIG 153 (H) 04/13/2022    HDL 62 04/13/2022    ALT 14 10/07/2022    AST 19 10/07/2022     10/07/2022    K 4.1 10/07/2022     10/07/2022    CREATININE 0.67 10/07/2022    BUN 17 10/07/2022    CO2 27 10/07/2022    TSH 1.68 04/13/2022    LABA1C 5.2 10/07/2022     Lab Results   Component Value Date    CALCIUM 9.3 10/07/2022     Lab Results   Component Value Date    LDLCHOLESTEROL 118 04/13/2022       Assessment and Plan:    1. Major depressive disorder with single episode, remission status unspecified  Medication dose increase from 10 to 20mg, will reassess patient in 4 weeks. - FLUoxetine (PROZAC) 20 MG capsule; Take 1 capsule by mouth daily  Dispense: 30 capsule; Refill: 3    2. BMI 30.0-30.9,adult  Patient complains of weight gain, does snack at night, patient educated on diet and exercise. But says she wants something for weight loss.  Initially wanted phentermine but informed patient that medication is not prescribed here. Patient wants her appetite suppressed. Agreed upon Orlistat, patient aware of risks/benefits of medication and associated liquid/greasy foul smelling stools. - orlistat (XENICAL) 120 MG capsule; Take 1 capsule by mouth 3 times daily (with meals)  Dispense: 90 capsule; Refill: 0    3. Weight gain  As above  - orlistat (XENICAL) 120 MG capsule; Take 1 capsule by mouth 3 times daily (with meals)  Dispense: 90 capsule; Refill: 0        Requested Prescriptions     Signed Prescriptions Disp Refills    orlistat (XENICAL) 120 MG capsule 90 capsule 0     Sig: Take 1 capsule by mouth 3 times daily (with meals)    FLUoxetine (PROZAC) 20 MG capsule 30 capsule 3     Sig: Take 1 capsule by mouth daily       Medications Discontinued During This Encounter   Medication Reason    FLUoxetine (PROZAC) 10 MG capsule Therapy completed       Zeyad Laser received counseling on the following healthy behaviors: nutrition, exercise and medication adherence    Discussed use,benefit, and side effects of prescribed medications. Barriers to medication compliance addressed. All patient questions answered. Pt voiced understanding. Return in about 4 weeks (around 3/24/2023), or if symptoms worsen or fail to improve, for depression/weight loss. Disclaimer: Some orall of this note was transcribed using voice-recognition software. This may cause typographical errors occasionally. Although all effort is made to fix these errors, please do not hesitate to contact our office if there Brandy De La Garza concern with the understanding of this note.

## 2023-03-18 DIAGNOSIS — F32.9 MAJOR DEPRESSIVE DISORDER WITH SINGLE EPISODE, REMISSION STATUS UNSPECIFIED: ICD-10-CM

## 2023-03-20 RX ORDER — FLUOXETINE HYDROCHLORIDE 20 MG/1
CAPSULE ORAL
Qty: 30 CAPSULE | Refills: 3 | Status: SHIPPED | OUTPATIENT
Start: 2023-03-20

## 2023-03-20 NOTE — TELEPHONE ENCOUNTER
E-scribe request for FLUOXETINE HCL 20 MG CAPSULE. Please review and e-scribe if applicable.      Last Visit Date:  2/24/2023  Next Visit Date:  3/24/2023    Hemoglobin A1C (%)   Date Value   10/07/2022 5.2   04/13/2022 5.4             ( goal A1C is < 7)   No results found for: LABMICR  LDL Cholesterol (mg/dL)   Date Value   04/13/2022 118       (goal LDL is <100)   AST (U/L)   Date Value   10/07/2022 19     ALT (U/L)   Date Value   10/07/2022 14     BUN (mg/dL)   Date Value   10/07/2022 17     BP Readings from Last 3 Encounters:   02/24/23 113/70   02/09/23 105/72   01/17/23 114/72          (goal 120/80)        Patient Active Problem List:     Chronic bilateral low back pain with left-sided sciatica     Constipation     Numbness and tingling in both hands     Chronic bilateral low back pain with bilateral sciatica     Gastroesophageal reflux disease without esophagitis     Mixed hyperlipidemia     Hypercholesteremia     Class 1 obesity due to excess calories without serious comorbidity with body mass index (BMI) of 30.0 to 30.9 in adult     Arthritis of left hip     S/P total knee arthroplasty, left     Mood disorder ([facility] [address]     Major depressive disorder with single episode     Anemia     Vitamin D deficiency     BMI 30.0-30.9,adult     Weight gain      ----JF

## 2023-03-24 ENCOUNTER — TELEPHONE (OUTPATIENT)
Dept: FAMILY MEDICINE CLINIC | Age: 58
End: 2023-03-24

## 2023-05-04 ENCOUNTER — OFFICE VISIT (OUTPATIENT)
Dept: NEUROSURGERY | Age: 58
End: 2023-05-04

## 2023-05-04 VITALS
SYSTOLIC BLOOD PRESSURE: 114 MMHG | WEIGHT: 171 LBS | DIASTOLIC BLOOD PRESSURE: 78 MMHG | HEIGHT: 63 IN | HEART RATE: 65 BPM | TEMPERATURE: 98.5 F | BODY MASS INDEX: 30.3 KG/M2 | OXYGEN SATURATION: 96 %

## 2023-05-04 DIAGNOSIS — G56.03 BILATERAL CARPAL TUNNEL SYNDROME: Primary | ICD-10-CM

## 2023-05-04 DIAGNOSIS — M48.02 CERVICAL STENOSIS OF SPINAL CANAL: ICD-10-CM

## 2023-05-04 RX ORDER — CYCLOBENZAPRINE HCL 5 MG
5 TABLET ORAL NIGHTLY PRN
Qty: 28 TABLET | Refills: 2 | Status: SHIPPED | OUTPATIENT
Start: 2023-05-04

## 2023-05-04 NOTE — PROGRESS NOTES
Department of Neurosurgery                                                      Follow up visit      History Obtained From:  patient    CHIEF COMPLAINT:         Chief Complaint   Patient presents with    Follow-up     4-10 weeks; MRI. HISTORY OF PRESENT ILLNESS:       The patient is a 62 y.o. female who presents for follow up for frequent falls. Patient's first time seeing Dr. Bonnie Otto, previously was seen by Charla Hoffman. Patient reports that she has been experiencing occasional numbness and tingling in bilateral arms and shoulders. Admits to difficulty with dexterity such as dropping objects. States that bilateral hands will cramp which will allow her to submerge them in ice cold water. Has been having neck pain, will use a neck roll and take warm showers to relieve the pain. She also reports low back pain that will occasionally lead to numbness and weakness in legs, causing the patient to fall frequently. Hasn't fell in about 3-4 months. Neck pain is worse than the back pain. Patient underwent left hip replacement in December. Had bilateral carpal tunnel surgery about 20 years ago and had no relief.      PAST MEDICAL HISTORY :       Past Medical History:        Diagnosis Date    Arthritis     Heartburn     Hyperlipidemia        Past Surgical History:        Procedure Laterality Date    BLADDER SUSPENSION      x3    CARPAL TUNNEL RELEASE Bilateral     COLONOSCOPY      ENDOSCOPY, COLON, DIAGNOSTIC      HERNIA REPAIR      umbilical    HYSTERECTOMY (CERVIX STATUS UNKNOWN)      OVARY REMOVAL      STOMACH SURGERY      Gastric Sleeve    TOTAL HIP ARTHROPLASTY Left 11/1/2022    LEFT HIP TOTAL ARTHROPLASTY ANTERIOR APPROACH - MEDACTA performed by Mary Garcia DO at [place] [id number] History:   Social History     Socioeconomic History    Marital status: Single     Spouse name: Not on file    Number of children: Not on file    Years of education: Not on file    Highest education

## 2023-05-10 DIAGNOSIS — K21.9 GASTROESOPHAGEAL REFLUX DISEASE WITHOUT ESOPHAGITIS: ICD-10-CM

## 2023-05-11 ENCOUNTER — HOSPITAL ENCOUNTER (OUTPATIENT)
Dept: CT IMAGING | Facility: CLINIC | Age: 58
Discharge: HOME | End: 2023-05-13
Payer: MEDICARE

## 2023-05-11 ENCOUNTER — HOSPITAL ENCOUNTER (OUTPATIENT)
Dept: GENERAL RADIOLOGY | Facility: CLINIC | Age: 58
Discharge: HOME | End: 2023-05-13
Payer: MEDICARE

## 2023-05-11 ENCOUNTER — HOSPITAL ENCOUNTER (OUTPATIENT)
Facility: CLINIC | Age: 58
Discharge: HOME | End: 2023-05-13
Payer: MEDICARE

## 2023-05-11 ENCOUNTER — HOSPITAL ENCOUNTER (OUTPATIENT)
Age: 58
Setting detail: SPECIMEN
Discharge: HOME | End: 2023-05-11

## 2023-05-11 DIAGNOSIS — M48.02 CERVICAL STENOSIS OF SPINAL CANAL: ICD-10-CM

## 2023-05-11 DIAGNOSIS — D64.9 ANEMIA, UNSPECIFIED TYPE: ICD-10-CM

## 2023-05-11 PROCEDURE — 72040 X-RAY EXAM NECK SPINE 2-3 VW: CPT

## 2023-05-11 PROCEDURE — 72125 CT NECK SPINE W/O DYE: CPT

## 2023-05-11 RX ORDER — OMEPRAZOLE 10 MG/1
CAPSULE, DELAYED RELEASE ORAL DAILY
OUTPATIENT
Start: 2023-05-11

## 2023-05-11 RX ORDER — OMEPRAZOLE 20 MG/1
CAPSULE, DELAYED RELEASE ORAL
Qty: 90 CAPSULE | Refills: 1 | OUTPATIENT
Start: 2023-05-11

## 2023-05-12 LAB
IRON SATURATION: 10 % (ref 20–55)
IRON SERPL-MCNC: 35 UG/DL (ref 37–145)
TIBC SERPL-MCNC: 340 UG/DL (ref 250–450)
UNSATURATED IRON BINDING CAPACITY: 305 UG/DL (ref 112–347)

## 2023-05-12 RX ORDER — CYCLOBENZAPRINE HCL 5 MG
5 TABLET ORAL NIGHTLY PRN
Qty: 28 TABLET | Refills: 2 | OUTPATIENT
Start: 2023-05-12

## 2023-08-10 ENCOUNTER — OFFICE VISIT (OUTPATIENT)
Dept: NEUROSURGERY | Age: 58
End: 2023-08-10
Payer: MEDICARE

## 2023-08-10 VITALS
HEART RATE: 78 BPM | SYSTOLIC BLOOD PRESSURE: 126 MMHG | WEIGHT: 171 LBS | DIASTOLIC BLOOD PRESSURE: 70 MMHG | BODY MASS INDEX: 30.3 KG/M2 | HEIGHT: 63 IN | OXYGEN SATURATION: 98 % | RESPIRATION RATE: 16 BRPM

## 2023-08-10 DIAGNOSIS — G99.2 STENOSIS OF CERVICAL SPINE WITH MYELOPATHY (HCC): ICD-10-CM

## 2023-08-10 DIAGNOSIS — M48.062 SPINAL STENOSIS OF LUMBAR REGION WITH NEUROGENIC CLAUDICATION: ICD-10-CM

## 2023-08-10 DIAGNOSIS — M48.02 STENOSIS OF CERVICAL SPINE WITH MYELOPATHY (HCC): ICD-10-CM

## 2023-08-10 DIAGNOSIS — M48.062 LUMBAR STENOSIS WITH NEUROGENIC CLAUDICATION: ICD-10-CM

## 2023-08-10 DIAGNOSIS — G56.03 BILATERAL CARPAL TUNNEL SYNDROME: Primary | ICD-10-CM

## 2023-08-10 PROCEDURE — 99214 OFFICE O/P EST MOD 30 MIN: CPT | Performed by: NEUROLOGICAL SURGERY

## 2023-08-10 NOTE — PROGRESS NOTES
Years of education: Not on file    Highest education level: Not on file   Occupational History    Not on file   Tobacco Use    Smoking status: Former     Packs/day: 1.00     Years: 23.00     Pack years: 23.00     Types: Cigarettes     Quit date: 2000     Years since quittin.6    Smokeless tobacco: Never   Vaping Use    Vaping Use: Former    Substances: Nicotine    Devices: Pre-filled or refillable cartridge   Substance and Sexual Activity    Alcohol use: Not Currently     Comment: rarely    Drug use: Never    Sexual activity: Not Currently   Other Topics Concern    Not on file   Social History Narrative    Not on file     Social Determinants of Health     Financial Resource Strain: Low Risk     Difficulty of Paying Living Expenses: Not very hard   Food Insecurity: No Food Insecurity    Worried About Running Out of Food in the Last Year: Never true    [address] in the Last Year: Never true   Transportation Needs: Unknown    Lack of Transportation (Medical): Not on file    Lack of Transportation (Non-Medical): No   Physical Activity: Not on file   Stress: Not on file   Social Connections: Not on file   Intimate Partner Violence: Not on file   Housing Stability: Unknown    Unable to Pay for Housing in the Last Year: Not on file    Number of Places Lived in the Last Year: Not on file    Unstable Housing in the Last Year: No       Family History:       Problem Relation Age of Onset    Asthma Mother     Other Mother         COPD    Cancer Father         Lung    Substance Abuse Sister         Clean for a year now    Obesity Sister     No Known Problems Brother     No Known Problems Brother        Allergies:  Penicillins    Home Medications:  Prior to Admission medications    Medication Sig Start Date End Date Taking?  Authorizing Provider   vitamin D (ERGOCALCIFEROL) 1.25 MG (19020 UT) CAPS capsule Take 1 capsule by mouth once a week 23  Yes Terrence Sharpe MD   cyclobenzaprine (FLEXERIL) 5 MG tablet

## 2023-08-18 ENCOUNTER — HOSPITAL ENCOUNTER (OUTPATIENT)
Dept: CT IMAGING | Age: 58
Discharge: HOME | End: 2023-08-20
Attending: NEUROLOGICAL SURGERY

## 2023-08-18 DIAGNOSIS — M48.062 LUMBAR STENOSIS WITH NEUROGENIC CLAUDICATION: ICD-10-CM

## 2023-08-21 ENCOUNTER — HOSPITAL ENCOUNTER (OUTPATIENT)
Dept: NEUROLOGY | Age: 58
Discharge: HOME | End: 2023-08-21
Payer: MEDICARE

## 2023-08-21 PROCEDURE — 95910 NRV CNDJ TEST 7-8 STUDIES: CPT | Performed by: PHYSICAL MEDICINE & REHABILITATION

## 2023-08-21 PROCEDURE — 95886 MUSC TEST DONE W/N TEST COMP: CPT | Performed by: PHYSICAL MEDICINE & REHABILITATION

## 2023-10-02 ENCOUNTER — TELEPHONE (OUTPATIENT)
Dept: FAMILY MEDICINE CLINIC | Age: 58
End: 2023-10-02

## 2023-10-02 NOTE — TELEPHONE ENCOUNTER
PC to pt to schedule f/u, Last seen 2/24/23, Phone went straight to busy signal without ringing or offering option for VM.

## 2023-10-17 ENCOUNTER — PATIENT MESSAGE (OUTPATIENT)
Dept: NEUROSURGERY | Age: 58
End: 2023-10-17

## 2023-10-20 ENCOUNTER — TELEPHONE (OUTPATIENT)
Dept: FAMILY MEDICINE CLINIC | Age: 58
End: 2023-10-20

## 2023-10-20 NOTE — TELEPHONE ENCOUNTER
From: Cecilia Limon  To: Dr. Hull Bryan: 10/17/2023 11:55 AM EDT  Subject: Surgery     Do I have to remove my acrylic nails before surgery?

## 2023-10-23 ENCOUNTER — TELEPHONE (OUTPATIENT)
Dept: FAMILY MEDICINE CLINIC | Age: 58
End: 2023-10-23

## 2023-10-23 NOTE — TELEPHONE ENCOUNTER
Writer called patient to schedule an appointment, patient has not been seen since February was due to follow up in 4 weeks from that time. Left a vm to call the office back to schedule.

## 2023-10-26 RX ORDER — SODIUM CHLORIDE, SODIUM LACTATE, POTASSIUM CHLORIDE, CALCIUM CHLORIDE 600; 310; 30; 20 MG/100ML; MG/100ML; MG/100ML; MG/100ML
INJECTION, SOLUTION INTRAVENOUS CONTINUOUS
OUTPATIENT
Start: 2023-10-26

## 2023-10-26 NOTE — DISCHARGE INSTRUCTIONS
because of them. No contact lenses to be worn. It is ok to wear your glasses in pre op but they will be removed prior to going into the operating room. Dentures/partials will likely need to be removed in pre op depending on your type of anesthesia, please do not use adhesives on the day of surgery. Bathe as instructed with the special soap given to you. No lotions, powders or creams after bathing. Wear loose comfortable clothing / shoes that are easy to get on and off over wounds or casts. If you are going to be admitted after surgery please bring your Cpap or BiPap if you have it at home. Keep patient belongings to a minimum and leave valuables at home. If you are staying overnight with us, please bring a SMALL bag of personal items. We cannot accommodate large items, like suitcases. If you are going home after anesthesia or sedation then you must have a responsible adult with you to take you home and to be with you for the first 24 hours after surgery. If you do not have someone to stay with you your surgery might get cancelled. Please contact your surgeon's office to see if other arrangements can be made if you can not find someone to stay with you. If you have any other questions on the day of surgery please contact [phone] or [phone]    If you have any other questions regarding your procedure/surgery please call  your surgeon's office.

## 2023-10-27 ENCOUNTER — HOSPITAL ENCOUNTER (OUTPATIENT)
Dept: PREADMISSION TESTING | Age: 58
End: 2023-10-27
Payer: MEDICARE

## 2023-10-27 ENCOUNTER — HOSPITAL ENCOUNTER (OUTPATIENT)
Dept: PREADMISSION TESTING | Age: 58
Discharge: HOME | End: 2023-10-31

## 2023-10-27 VITALS
WEIGHT: 185 LBS | HEIGHT: 64 IN | TEMPERATURE: 97 F | OXYGEN SATURATION: 97 % | BODY MASS INDEX: 31.58 KG/M2 | SYSTOLIC BLOOD PRESSURE: 97 MMHG | DIASTOLIC BLOOD PRESSURE: 73 MMHG | HEART RATE: 70 BPM | RESPIRATION RATE: 18 BRPM

## 2023-10-27 LAB
ABO + RH BLD: NORMAL
ANION GAP SERPL CALCULATED.3IONS-SCNC: 7 MMOL/L (ref 9–17)
ARM BAND NUMBER: NORMAL
BLOOD BANK SAMPLE EXPIRATION: NORMAL
BLOOD GROUP ANTIBODIES SERPL: NEGATIVE
BUN SERPL-MCNC: 11 MG/DL (ref 6–20)
CHLORIDE SERPL-SCNC: 101 MMOL/L (ref 98–107)
CO2 SERPL-SCNC: 30 MMOL/L (ref 20–31)
CREAT SERPL-MCNC: 0.8 MG/DL (ref 0.5–0.9)
EKG ATRIAL RATE: 62 BPM
EKG P AXIS: 72 DEGREES
EKG P-R INTERVAL: 142 MS
EKG Q-T INTERVAL: 420 MS
EKG QRS DURATION: 78 MS
EKG QTC CALCULATION (BAZETT): 426 MS
EKG R AXIS: 94 DEGREES
EKG T AXIS: 35 DEGREES
EKG VENTRICULAR RATE: 62 BPM
ERYTHROCYTE [DISTWIDTH] IN BLOOD BY AUTOMATED COUNT: 14.7 % (ref 11.8–14.4)
GFR SERPL CREATININE-BSD FRML MDRD: >60 ML/MIN/1.73M2
GLUCOSE SERPL-MCNC: 80 MG/DL (ref 70–99)
HCT VFR BLD AUTO: 45.4 % (ref 36.3–47.1)
HGB BLD-MCNC: 14.2 G/DL (ref 11.9–15.1)
INR PPP: 1
MCH RBC QN AUTO: 27.5 PG (ref 25.2–33.5)
MCHC RBC AUTO-ENTMCNC: 31.3 G/DL (ref 28.4–34.8)
MCV RBC AUTO: 88 FL (ref 82.6–102.9)
NRBC BLD-RTO: 0 PER 100 WBC
PARTIAL THROMBOPLASTIN TIME: 27 SEC (ref 23–36.5)
PLATELET # BLD AUTO: 213 K/UL (ref 138–453)
PMV BLD AUTO: 9 FL (ref 8.1–13.5)
POTASSIUM SERPL-SCNC: 4.2 MMOL/L (ref 3.7–5.3)
PROTHROMBIN TIME: 13.2 SEC (ref 11.7–14.9)
RBC # BLD AUTO: 5.16 M/UL (ref 3.95–5.11)
SODIUM SERPL-SCNC: 138 MMOL/L (ref 135–144)
WBC OTHER # BLD: 7 K/UL (ref 3.5–11.3)

## 2023-10-27 PROCEDURE — 85027 COMPLETE CBC AUTOMATED: CPT

## 2023-10-27 PROCEDURE — 84520 ASSAY OF UREA NITROGEN: CPT

## 2023-10-27 PROCEDURE — 36415 COLL VENOUS BLD VENIPUNCTURE: CPT

## 2023-10-27 PROCEDURE — 85730 THROMBOPLASTIN TIME PARTIAL: CPT

## 2023-10-27 PROCEDURE — 86901 BLOOD TYPING SEROLOGIC RH(D): CPT

## 2023-10-27 PROCEDURE — 86850 RBC ANTIBODY SCREEN: CPT

## 2023-10-27 PROCEDURE — 86900 BLOOD TYPING SEROLOGIC ABO: CPT

## 2023-10-27 PROCEDURE — 82947 ASSAY GLUCOSE BLOOD QUANT: CPT

## 2023-10-27 PROCEDURE — 80051 ELECTROLYTE PANEL: CPT

## 2023-10-27 PROCEDURE — 93010 ELECTROCARDIOGRAM REPORT: CPT | Performed by: INTERNAL MEDICINE

## 2023-10-27 PROCEDURE — 82565 ASSAY OF CREATININE: CPT

## 2023-10-27 PROCEDURE — 93005 ELECTROCARDIOGRAM TRACING: CPT | Performed by: ANESTHESIOLOGY

## 2023-10-27 PROCEDURE — 85610 PROTHROMBIN TIME: CPT

## 2023-10-27 RX ORDER — CHOLECALCIFEROL (VITAMIN D3) 125 MCG
500 CAPSULE ORAL DAILY
COMMUNITY

## 2023-10-27 RX ORDER — PNV NO.95/FERROUS FUM/FOLIC AC 28MG-0.8MG
1 TABLET ORAL EVERY OTHER DAY
COMMUNITY

## 2023-10-27 RX ORDER — KRILL/OM-3/DHA/EPA/PHOSPHO/AST 500-110 MG
1 CAPSULE ORAL DAILY
COMMUNITY

## 2023-10-27 RX ORDER — OMEPRAZOLE 20 MG/1
20 CAPSULE, DELAYED RELEASE ORAL DAILY
COMMUNITY

## 2023-10-27 ASSESSMENT — PAIN DESCRIPTION - PAIN TYPE: TYPE: ACUTE PAIN

## 2023-10-27 ASSESSMENT — PAIN DESCRIPTION - ONSET: ONSET: ON-GOING

## 2023-10-27 ASSESSMENT — PAIN DESCRIPTION - FREQUENCY: FREQUENCY: INTERMITTENT

## 2023-10-27 ASSESSMENT — PAIN SCALES - GENERAL: PAINLEVEL_OUTOF10: 9

## 2023-10-27 ASSESSMENT — PAIN DESCRIPTION - LOCATION: LOCATION: HEAD

## 2023-10-27 ASSESSMENT — PAIN DESCRIPTION - DESCRIPTORS: DESCRIPTORS: THROBBING

## 2023-11-09 ENCOUNTER — ANESTHESIA EVENT (OUTPATIENT)
Dept: OPERATING ROOM | Age: 58
End: 2023-11-09
Payer: MEDICARE

## 2023-11-10 ENCOUNTER — ANESTHESIA (OUTPATIENT)
Dept: OPERATING ROOM | Age: 58
End: 2023-11-10
Payer: MEDICARE

## 2023-11-10 ENCOUNTER — HOSPITAL ENCOUNTER (INPATIENT)
Age: 58
LOS: 1 days | Discharge: HOME | DRG: 321 | End: 2023-11-11
Attending: NEUROLOGICAL SURGERY | Admitting: NEUROLOGICAL SURGERY
Payer: MEDICAID

## 2023-11-10 ENCOUNTER — APPOINTMENT (OUTPATIENT)
Dept: GENERAL RADIOLOGY | Age: 58
DRG: 321 | End: 2023-11-10
Attending: NEUROLOGICAL SURGERY
Payer: MEDICAID

## 2023-11-10 DIAGNOSIS — G89.18 ACUTE POST-OPERATIVE PAIN: Primary | ICD-10-CM

## 2023-11-10 PROCEDURE — 1200000000 HC SEMI PRIVATE

## 2023-11-10 PROCEDURE — 0RG10A0 FUSION OF CERVICAL VERTEBRAL JOINT WITH INTERBODY FUSION DEVICE, ANTERIOR APPROACH, ANTERIOR COLUMN, OPEN APPROACH: ICD-10-PCS | Performed by: NEUROLOGICAL SURGERY

## 2023-11-10 PROCEDURE — 2709999900 HC NON-CHARGEABLE SUPPLY: Performed by: NEUROLOGICAL SURGERY

## 2023-11-10 PROCEDURE — 2500000003 HC RX 250 WO HCPCS

## 2023-11-10 PROCEDURE — 6360000002 HC RX W HCPCS

## 2023-11-10 PROCEDURE — 0RT30ZZ RESECTION OF CERVICAL VERTEBRAL DISC, OPEN APPROACH: ICD-10-PCS | Performed by: NEUROLOGICAL SURGERY

## 2023-11-10 PROCEDURE — 6370000000 HC RX 637 (ALT 250 FOR IP): Performed by: ANESTHESIOLOGY

## 2023-11-10 PROCEDURE — G0378 HOSPITAL OBSERVATION PER HR: HCPCS

## 2023-11-10 PROCEDURE — 0RR30JZ REPLACEMENT OF CERVICAL VERTEBRAL DISC WITH SYNTHETIC SUBSTITUTE, OPEN APPROACH: ICD-10-PCS | Performed by: NEUROLOGICAL SURGERY

## 2023-11-10 PROCEDURE — 6360000002 HC RX W HCPCS: Performed by: ANESTHESIOLOGY

## 2023-11-10 PROCEDURE — 22856 TOT DISC ARTHRP 1NTRSPC CRV: CPT | Performed by: NEUROLOGICAL SURGERY

## 2023-11-10 PROCEDURE — 2580000003 HC RX 258: Performed by: ANESTHESIOLOGY

## 2023-11-10 PROCEDURE — 3600000004 HC SURGERY LEVEL 4 BASE: Performed by: NEUROLOGICAL SURGERY

## 2023-11-10 PROCEDURE — 2500000003 HC RX 250 WO HCPCS: Performed by: NEUROLOGICAL SURGERY

## 2023-11-10 PROCEDURE — 6370000000 HC RX 637 (ALT 250 FOR IP): Performed by: NURSE PRACTITIONER

## 2023-11-10 PROCEDURE — 6360000002 HC RX W HCPCS: Performed by: NURSE PRACTITIONER

## 2023-11-10 PROCEDURE — 6360000002 HC RX W HCPCS: Performed by: NEUROLOGICAL SURGERY

## 2023-11-10 PROCEDURE — 3700000000 HC ANESTHESIA ATTENDED CARE: Performed by: NEUROLOGICAL SURGERY

## 2023-11-10 PROCEDURE — 2580000003 HC RX 258

## 2023-11-10 PROCEDURE — C1713 ANCHOR/SCREW BN/BN,TIS/BN: HCPCS | Performed by: NEUROLOGICAL SURGERY

## 2023-11-10 PROCEDURE — 6370000000 HC RX 637 (ALT 250 FOR IP): Performed by: NEUROLOGICAL SURGERY

## 2023-11-10 PROCEDURE — 2580000003 HC RX 258: Performed by: NURSE PRACTITIONER

## 2023-11-10 PROCEDURE — C1889 IMPLANT/INSERT DEVICE, NOC: HCPCS | Performed by: NEUROLOGICAL SURGERY

## 2023-11-10 PROCEDURE — 3700000001 HC ADD 15 MINUTES (ANESTHESIA): Performed by: NEUROLOGICAL SURGERY

## 2023-11-10 PROCEDURE — 2580000003 HC RX 258: Performed by: NEUROLOGICAL SURGERY

## 2023-11-10 PROCEDURE — 3600000014 HC SURGERY LEVEL 4 ADDTL 15MIN: Performed by: NEUROLOGICAL SURGERY

## 2023-11-10 PROCEDURE — 22858 TOT DISC ARTHRP 2ND LVL CRV: CPT | Performed by: NEUROLOGICAL SURGERY

## 2023-11-10 PROCEDURE — 7100000001 HC PACU RECOVERY - ADDTL 15 MIN: Performed by: NEUROLOGICAL SURGERY

## 2023-11-10 PROCEDURE — C1729 CATH, DRAINAGE: HCPCS | Performed by: NEUROLOGICAL SURGERY

## 2023-11-10 PROCEDURE — 7100000000 HC PACU RECOVERY - FIRST 15 MIN: Performed by: NEUROLOGICAL SURGERY

## 2023-11-10 PROCEDURE — 2720000010 HC SURG SUPPLY STERILE: Performed by: NEUROLOGICAL SURGERY

## 2023-11-10 DEVICE — GRAFT CELLR BNE MATRX INFLUX SPARC 2CC: Type: IMPLANTABLE DEVICE | Site: SPINE CERVICAL | Status: FUNCTIONAL

## 2023-11-10 DEVICE — IMPLANTABLE DEVICE: Type: IMPLANTABLE DEVICE | Site: SPINE CERVICAL | Status: FUNCTIONAL

## 2023-11-10 DEVICE — DISC 6972450 PRESTIGE
Type: IMPLANTABLE DEVICE | Site: SPINE CERVICAL | Status: FUNCTIONAL
Brand: PRESTIGE LP™

## 2023-11-10 RX ORDER — ACETAMINOPHEN 325 MG/1
650 TABLET ORAL EVERY 6 HOURS
Status: DISCONTINUED | OUTPATIENT
Start: 2023-11-10 | End: 2023-11-11 | Stop reason: HOSPADM

## 2023-11-10 RX ORDER — LANOLIN ALCOHOL/MO/W.PET/CERES
325 CREAM (GRAM) TOPICAL EVERY OTHER DAY
Status: DISCONTINUED | OUTPATIENT
Start: 2023-11-10 | End: 2023-11-11 | Stop reason: HOSPADM

## 2023-11-10 RX ORDER — SODIUM CHLORIDE 9 MG/ML
INJECTION, SOLUTION INTRAVENOUS PRN
Status: DISCONTINUED | OUTPATIENT
Start: 2023-11-10 | End: 2023-11-10 | Stop reason: HOSPADM

## 2023-11-10 RX ORDER — MIDAZOLAM HYDROCHLORIDE 2 MG/2ML
2 INJECTION, SOLUTION INTRAMUSCULAR; INTRAVENOUS ONCE
Status: COMPLETED | OUTPATIENT
Start: 2023-11-10 | End: 2023-11-10

## 2023-11-10 RX ORDER — MIDAZOLAM HYDROCHLORIDE 1 MG/ML
INJECTION INTRAMUSCULAR; INTRAVENOUS PRN
Status: DISCONTINUED | OUTPATIENT
Start: 2023-11-10 | End: 2023-11-10 | Stop reason: SDUPTHER

## 2023-11-10 RX ORDER — SODIUM CHLORIDE 9 MG/ML
INJECTION, SOLUTION INTRAVENOUS CONTINUOUS
Status: DISCONTINUED | OUTPATIENT
Start: 2023-11-10 | End: 2023-11-11

## 2023-11-10 RX ORDER — MAGNESIUM HYDROXIDE 1200 MG/15ML
LIQUID ORAL CONTINUOUS PRN
Status: COMPLETED | OUTPATIENT
Start: 2023-11-10 | End: 2023-11-10

## 2023-11-10 RX ORDER — SODIUM CHLORIDE, SODIUM LACTATE, POTASSIUM CHLORIDE, CALCIUM CHLORIDE 600; 310; 30; 20 MG/100ML; MG/100ML; MG/100ML; MG/100ML
INJECTION, SOLUTION INTRAVENOUS ONCE
Status: DISCONTINUED | OUTPATIENT
Start: 2023-11-10 | End: 2023-11-11 | Stop reason: HOSPADM

## 2023-11-10 RX ORDER — SODIUM CHLORIDE 9 MG/ML
INJECTION, SOLUTION INTRAVENOUS PRN
Status: DISCONTINUED | OUTPATIENT
Start: 2023-11-10 | End: 2023-11-11 | Stop reason: HOSPADM

## 2023-11-10 RX ORDER — ONDANSETRON 2 MG/ML
4 INJECTION INTRAMUSCULAR; INTRAVENOUS EVERY 6 HOURS PRN
Status: DISCONTINUED | OUTPATIENT
Start: 2023-11-10 | End: 2023-11-11 | Stop reason: HOSPADM

## 2023-11-10 RX ORDER — ALBUTEROL SULFATE 90 UG/1
2 AEROSOL, METERED RESPIRATORY (INHALATION) EVERY 6 HOURS PRN
Status: DISCONTINUED | OUTPATIENT
Start: 2023-11-10 | End: 2023-11-10 | Stop reason: HOSPADM

## 2023-11-10 RX ORDER — SUCCINYLCHOLINE CHLORIDE 20 MG/ML
INJECTION INTRAMUSCULAR; INTRAVENOUS PRN
Status: DISCONTINUED | OUTPATIENT
Start: 2023-11-10 | End: 2023-11-10 | Stop reason: SDUPTHER

## 2023-11-10 RX ORDER — SCOLOPAMINE TRANSDERMAL SYSTEM 1 MG/1
1 PATCH, EXTENDED RELEASE TRANSDERMAL ONCE
Status: DISCONTINUED | OUTPATIENT
Start: 2023-11-10 | End: 2023-11-10

## 2023-11-10 RX ORDER — PANTOPRAZOLE SODIUM 40 MG/1
40 TABLET, DELAYED RELEASE ORAL
Status: DISCONTINUED | OUTPATIENT
Start: 2023-11-11 | End: 2023-11-11 | Stop reason: HOSPADM

## 2023-11-10 RX ORDER — SODIUM CHLORIDE, SODIUM LACTATE, POTASSIUM CHLORIDE, CALCIUM CHLORIDE 600; 310; 30; 20 MG/100ML; MG/100ML; MG/100ML; MG/100ML
INJECTION, SOLUTION INTRAVENOUS CONTINUOUS
Status: DISCONTINUED | OUTPATIENT
Start: 2023-11-10 | End: 2023-11-10

## 2023-11-10 RX ORDER — CYCLOBENZAPRINE HCL 10 MG
10 TABLET ORAL 3 TIMES DAILY PRN
Status: DISCONTINUED | OUTPATIENT
Start: 2023-11-10 | End: 2023-11-11 | Stop reason: HOSPADM

## 2023-11-10 RX ORDER — ALBUTEROL SULFATE 2.5 MG/3ML
2.5 SOLUTION RESPIRATORY (INHALATION) EVERY 8 HOURS PRN
Status: DISCONTINUED | OUTPATIENT
Start: 2023-11-10 | End: 2023-11-10 | Stop reason: HOSPADM

## 2023-11-10 RX ORDER — SENNA AND DOCUSATE SODIUM 50; 8.6 MG/1; MG/1
1 TABLET, FILM COATED ORAL 2 TIMES DAILY
Status: DISCONTINUED | OUTPATIENT
Start: 2023-11-10 | End: 2023-11-11 | Stop reason: HOSPADM

## 2023-11-10 RX ORDER — CHOLECALCIFEROL (VITAMIN D3) 125 MCG
500 CAPSULE ORAL DAILY
Status: DISCONTINUED | OUTPATIENT
Start: 2023-11-10 | End: 2023-11-11 | Stop reason: HOSPADM

## 2023-11-10 RX ORDER — LIDOCAINE HYDROCHLORIDE 10 MG/ML
INJECTION, SOLUTION EPIDURAL; INFILTRATION; INTRACAUDAL; PERINEURAL PRN
Status: DISCONTINUED | OUTPATIENT
Start: 2023-11-10 | End: 2023-11-10 | Stop reason: SDUPTHER

## 2023-11-10 RX ORDER — FENTANYL CITRATE 50 UG/ML
INJECTION, SOLUTION INTRAMUSCULAR; INTRAVENOUS PRN
Status: DISCONTINUED | OUTPATIENT
Start: 2023-11-10 | End: 2023-11-10 | Stop reason: SDUPTHER

## 2023-11-10 RX ORDER — BISACODYL 10 MG
10 SUPPOSITORY, RECTAL RECTAL DAILY PRN
Status: DISCONTINUED | OUTPATIENT
Start: 2023-11-10 | End: 2023-11-11 | Stop reason: HOSPADM

## 2023-11-10 RX ORDER — SODIUM CHLORIDE 0.9 % (FLUSH) 0.9 %
5-40 SYRINGE (ML) INJECTION PRN
Status: DISCONTINUED | OUTPATIENT
Start: 2023-11-10 | End: 2023-11-10 | Stop reason: HOSPADM

## 2023-11-10 RX ORDER — LIDOCAINE HYDROCHLORIDE AND EPINEPHRINE 10; 10 MG/ML; UG/ML
INJECTION, SOLUTION INFILTRATION; PERINEURAL PRN
Status: DISCONTINUED | OUTPATIENT
Start: 2023-11-10 | End: 2023-11-10 | Stop reason: ALTCHOICE

## 2023-11-10 RX ORDER — OXYCODONE HYDROCHLORIDE 5 MG/1
5 TABLET ORAL EVERY 4 HOURS PRN
Status: DISCONTINUED | OUTPATIENT
Start: 2023-11-10 | End: 2023-11-11 | Stop reason: HOSPADM

## 2023-11-10 RX ORDER — OXYCODONE HYDROCHLORIDE 5 MG/1
10 TABLET ORAL EVERY 4 HOURS PRN
Status: DISCONTINUED | OUTPATIENT
Start: 2023-11-10 | End: 2023-11-11 | Stop reason: HOSPADM

## 2023-11-10 RX ORDER — SODIUM CHLORIDE 0.9 % (FLUSH) 0.9 %
5-40 SYRINGE (ML) INJECTION EVERY 12 HOURS SCHEDULED
Status: DISCONTINUED | OUTPATIENT
Start: 2023-11-10 | End: 2023-11-10 | Stop reason: HOSPADM

## 2023-11-10 RX ORDER — SODIUM CHLORIDE 0.9 % (FLUSH) 0.9 %
5-40 SYRINGE (ML) INJECTION EVERY 12 HOURS SCHEDULED
Status: DISCONTINUED | OUTPATIENT
Start: 2023-11-10 | End: 2023-11-11 | Stop reason: HOSPADM

## 2023-11-10 RX ORDER — SODIUM CHLORIDE, SODIUM LACTATE, POTASSIUM CHLORIDE, CALCIUM CHLORIDE 600; 310; 30; 20 MG/100ML; MG/100ML; MG/100ML; MG/100ML
INJECTION, SOLUTION INTRAVENOUS CONTINUOUS PRN
Status: DISCONTINUED | OUTPATIENT
Start: 2023-11-10 | End: 2023-11-10

## 2023-11-10 RX ORDER — FENTANYL CITRATE 50 UG/ML
50 INJECTION, SOLUTION INTRAMUSCULAR; INTRAVENOUS EVERY 5 MIN PRN
Status: DISCONTINUED | OUTPATIENT
Start: 2023-11-10 | End: 2023-11-10 | Stop reason: HOSPADM

## 2023-11-10 RX ORDER — SODIUM CHLORIDE, SODIUM LACTATE, POTASSIUM CHLORIDE, CALCIUM CHLORIDE 600; 310; 30; 20 MG/100ML; MG/100ML; MG/100ML; MG/100ML
INJECTION, SOLUTION INTRAVENOUS CONTINUOUS PRN
Status: DISCONTINUED | OUTPATIENT
Start: 2023-11-10 | End: 2023-11-10 | Stop reason: SDUPTHER

## 2023-11-10 RX ORDER — ONDANSETRON 4 MG/1
4 TABLET, ORALLY DISINTEGRATING ORAL EVERY 8 HOURS PRN
Status: DISCONTINUED | OUTPATIENT
Start: 2023-11-10 | End: 2023-11-11 | Stop reason: HOSPADM

## 2023-11-10 RX ORDER — FENTANYL CITRATE 50 UG/ML
25 INJECTION, SOLUTION INTRAMUSCULAR; INTRAVENOUS
Status: DISCONTINUED | OUTPATIENT
Start: 2023-11-10 | End: 2023-11-10 | Stop reason: HOSPADM

## 2023-11-10 RX ORDER — PHENYLEPHRINE HCL IN 0.9% NACL 1 MG/10 ML
SYRINGE (ML) INTRAVENOUS PRN
Status: DISCONTINUED | OUTPATIENT
Start: 2023-11-10 | End: 2023-11-10 | Stop reason: SDUPTHER

## 2023-11-10 RX ORDER — DEXAMETHASONE SODIUM PHOSPHATE 10 MG/ML
INJECTION INTRAMUSCULAR; INTRAVENOUS PRN
Status: DISCONTINUED | OUTPATIENT
Start: 2023-11-10 | End: 2023-11-10 | Stop reason: SDUPTHER

## 2023-11-10 RX ORDER — FENTANYL CITRATE 50 UG/ML
25 INJECTION, SOLUTION INTRAMUSCULAR; INTRAVENOUS EVERY 5 MIN PRN
Status: DISCONTINUED | OUTPATIENT
Start: 2023-11-10 | End: 2023-11-10 | Stop reason: HOSPADM

## 2023-11-10 RX ORDER — ONDANSETRON 2 MG/ML
INJECTION INTRAMUSCULAR; INTRAVENOUS PRN
Status: DISCONTINUED | OUTPATIENT
Start: 2023-11-10 | End: 2023-11-10 | Stop reason: SDUPTHER

## 2023-11-10 RX ORDER — SODIUM CHLORIDE 0.9 % (FLUSH) 0.9 %
5-40 SYRINGE (ML) INJECTION PRN
Status: DISCONTINUED | OUTPATIENT
Start: 2023-11-10 | End: 2023-11-11 | Stop reason: HOSPADM

## 2023-11-10 RX ORDER — DIPHENHYDRAMINE HYDROCHLORIDE 50 MG/ML
12.5 INJECTION INTRAMUSCULAR; INTRAVENOUS
Status: DISCONTINUED | OUTPATIENT
Start: 2023-11-10 | End: 2023-11-10 | Stop reason: HOSPADM

## 2023-11-10 RX ORDER — MIDAZOLAM HYDROCHLORIDE 2 MG/2ML
1 INJECTION, SOLUTION INTRAMUSCULAR; INTRAVENOUS EVERY 10 MIN PRN
Status: DISCONTINUED | OUTPATIENT
Start: 2023-11-10 | End: 2023-11-10 | Stop reason: HOSPADM

## 2023-11-10 RX ORDER — VANCOMYCIN HYDROCHLORIDE 1 G/20ML
INJECTION, POWDER, LYOPHILIZED, FOR SOLUTION INTRAVENOUS PRN
Status: DISCONTINUED | OUTPATIENT
Start: 2023-11-10 | End: 2023-11-10 | Stop reason: ALTCHOICE

## 2023-11-10 RX ORDER — PROPOFOL 10 MG/ML
INJECTION, EMULSION INTRAVENOUS CONTINUOUS PRN
Status: DISCONTINUED | OUTPATIENT
Start: 2023-11-10 | End: 2023-11-10 | Stop reason: SDUPTHER

## 2023-11-10 RX ORDER — CEFAZOLIN SODIUM 2 G/50ML
SOLUTION INTRAVENOUS PRN
Status: DISCONTINUED | OUTPATIENT
Start: 2023-11-10 | End: 2023-11-10 | Stop reason: SDUPTHER

## 2023-11-10 RX ORDER — GLYCOPYRROLATE 1 MG/5 ML
SYRINGE (ML) INTRAVENOUS PRN
Status: DISCONTINUED | OUTPATIENT
Start: 2023-11-10 | End: 2023-11-10 | Stop reason: SDUPTHER

## 2023-11-10 RX ORDER — FENTANYL CITRATE 50 UG/ML
50 INJECTION, SOLUTION INTRAMUSCULAR; INTRAVENOUS
Status: DISCONTINUED | OUTPATIENT
Start: 2023-11-10 | End: 2023-11-10 | Stop reason: HOSPADM

## 2023-11-10 RX ORDER — ONDANSETRON 2 MG/ML
4 INJECTION INTRAMUSCULAR; INTRAVENOUS
Status: COMPLETED | OUTPATIENT
Start: 2023-11-10 | End: 2023-11-10

## 2023-11-10 RX ADMIN — OXYCODONE HYDROCHLORIDE 10 MG: 5 TABLET ORAL at 17:45

## 2023-11-10 RX ADMIN — Medication 100 MCG: at 09:04

## 2023-11-10 RX ADMIN — SODIUM CHLORIDE, POTASSIUM CHLORIDE, SODIUM LACTATE AND CALCIUM CHLORIDE: 600; 310; 30; 20 INJECTION, SOLUTION INTRAVENOUS at 06:38

## 2023-11-10 RX ADMIN — PROPOFOL 150 MG: 10 INJECTION, EMULSION INTRAVENOUS at 07:48

## 2023-11-10 RX ADMIN — PROPOFOL 50 MCG/KG/MIN: 10 INJECTION, EMULSION INTRAVENOUS at 07:56

## 2023-11-10 RX ADMIN — ACETAMINOPHEN 650 MG: 325 TABLET ORAL at 17:46

## 2023-11-10 RX ADMIN — ONDANSETRON 4 MG: 2 INJECTION INTRAMUSCULAR; INTRAVENOUS at 12:05

## 2023-11-10 RX ADMIN — Medication 100 MCG: at 08:56

## 2023-11-10 RX ADMIN — MIDAZOLAM HYDROCHLORIDE 2 MG: 1 INJECTION, SOLUTION INTRAMUSCULAR; INTRAVENOUS at 14:37

## 2023-11-10 RX ADMIN — MIDAZOLAM 2 MG: 1 INJECTION INTRAMUSCULAR; INTRAVENOUS at 07:35

## 2023-11-10 RX ADMIN — CEFAZOLIN SODIUM 2000 MG: 2 SOLUTION INTRAVENOUS at 08:25

## 2023-11-10 RX ADMIN — Medication 0.2 MG: at 08:30

## 2023-11-10 RX ADMIN — LIDOCAINE HYDROCHLORIDE 50 MG: 10 INJECTION, SOLUTION EPIDURAL; INFILTRATION; INTRACAUDAL; PERINEURAL at 07:48

## 2023-11-10 RX ADMIN — SODIUM CHLORIDE, POTASSIUM CHLORIDE, SODIUM LACTATE AND CALCIUM CHLORIDE: 600; 310; 30; 20 INJECTION, SOLUTION INTRAVENOUS at 09:30

## 2023-11-10 RX ADMIN — DOCUSATE SODIUM 50 MG AND SENNOSIDES 8.6 MG 1 TABLET: 8.6; 5 TABLET, FILM COATED ORAL at 19:50

## 2023-11-10 RX ADMIN — Medication 500 MCG: at 18:09

## 2023-11-10 RX ADMIN — PHENYLEPHRINE HYDROCHLORIDE 25 MCG/MIN: 10 INJECTION INTRAVENOUS at 09:19

## 2023-11-10 RX ADMIN — Medication 100 MG: at 07:48

## 2023-11-10 RX ADMIN — Medication 2000 MG: at 18:09

## 2023-11-10 RX ADMIN — FENTANYL CITRATE 50 MCG: 50 INJECTION, SOLUTION INTRAMUSCULAR; INTRAVENOUS at 07:48

## 2023-11-10 RX ADMIN — FENTANYL CITRATE 50 MCG: 50 INJECTION, SOLUTION INTRAMUSCULAR; INTRAVENOUS at 11:16

## 2023-11-10 RX ADMIN — FENTANYL CITRATE 50 MCG: 50 INJECTION, SOLUTION INTRAMUSCULAR; INTRAVENOUS at 13:28

## 2023-11-10 RX ADMIN — SODIUM CHLORIDE, PRESERVATIVE FREE 10 ML: 5 INJECTION INTRAVENOUS at 19:50

## 2023-11-10 RX ADMIN — Medication 100 MCG: at 08:07

## 2023-11-10 RX ADMIN — FENTANYL CITRATE 50 MCG: 50 INJECTION, SOLUTION INTRAMUSCULAR; INTRAVENOUS at 08:15

## 2023-11-10 RX ADMIN — ONDANSETRON 4 MG: 2 INJECTION INTRAMUSCULAR; INTRAVENOUS at 17:45

## 2023-11-10 RX ADMIN — Medication 100 MCG: at 08:42

## 2023-11-10 RX ADMIN — ACETAMINOPHEN 650 MG: 325 TABLET ORAL at 23:13

## 2023-11-10 RX ADMIN — Medication 100 MCG: at 08:48

## 2023-11-10 RX ADMIN — SODIUM CHLORIDE, POTASSIUM CHLORIDE, SODIUM LACTATE AND CALCIUM CHLORIDE: 600; 310; 30; 20 INJECTION, SOLUTION INTRAVENOUS at 07:52

## 2023-11-10 RX ADMIN — Medication 100 MCG: at 08:22

## 2023-11-10 RX ADMIN — Medication 100 MCG: at 09:07

## 2023-11-10 RX ADMIN — Medication 100 MCG: at 08:29

## 2023-11-10 RX ADMIN — DEXAMETHASONE SODIUM PHOSPHATE 10 MG: 10 INJECTION INTRAMUSCULAR; INTRAVENOUS at 07:57

## 2023-11-10 RX ADMIN — SODIUM CHLORIDE: 9 INJECTION, SOLUTION INTRAVENOUS at 17:52

## 2023-11-10 RX ADMIN — FENTANYL CITRATE 50 MCG: 50 INJECTION, SOLUTION INTRAMUSCULAR; INTRAVENOUS at 12:18

## 2023-11-10 RX ADMIN — FERROUS SULFATE TAB EC 325 MG (65 MG FE EQUIVALENT) 325 MG: 325 (65 FE) TABLET DELAYED RESPONSE at 18:09

## 2023-11-10 RX ADMIN — ONDANSETRON 4 MG: 2 INJECTION INTRAMUSCULAR; INTRAVENOUS at 14:34

## 2023-11-10 ASSESSMENT — PAIN DESCRIPTION - PAIN TYPE
TYPE: SURGICAL PAIN

## 2023-11-10 ASSESSMENT — PAIN DESCRIPTION - ORIENTATION: ORIENTATION: MID

## 2023-11-10 ASSESSMENT — PAIN DESCRIPTION - LOCATION
LOCATION: NECK
LOCATION: HEAD;SHOULDER
LOCATION: NECK

## 2023-11-10 ASSESSMENT — PAIN SCALES - GENERAL
PAINLEVEL_OUTOF10: 0
PAINLEVEL_OUTOF10: 3
PAINLEVEL_OUTOF10: 9
PAINLEVEL_OUTOF10: 0
PAINLEVEL_OUTOF10: 9
PAINLEVEL_OUTOF10: 10

## 2023-11-10 ASSESSMENT — PAIN DESCRIPTION - DESCRIPTORS
DESCRIPTORS: PRESSURE
DESCRIPTORS: ACHING
DESCRIPTORS: BURNING
DESCRIPTORS: PRESSURE

## 2023-11-10 ASSESSMENT — PAIN - FUNCTIONAL ASSESSMENT
PAIN_FUNCTIONAL_ASSESSMENT: PREVENTS OR INTERFERES WITH MANY ACTIVE NOT PASSIVE ACTIVITIES
PAIN_FUNCTIONAL_ASSESSMENT: 0-10

## 2023-11-10 ASSESSMENT — LIFESTYLE VARIABLES
HOW OFTEN DO YOU HAVE A DRINK CONTAINING ALCOHOL: NEVER
HOW MANY STANDARD DRINKS CONTAINING ALCOHOL DO YOU HAVE ON A TYPICAL DAY: PATIENT DOES NOT DRINK

## 2023-11-10 NOTE — BRIEF OP NOTE
Brief Postoperative Note      Patient: Adelina Paul  YOB: 1965  MRN: [id number]    Date of Procedure: 11/10/2023    Pre-Op Diagnosis Codes:     * Stenosis of cervical spine with myelopathy ([address]) [M48.02, G99.2]    Post-Op Diagnosis: Same       Procedure(s):  ANTERIOR CERVICAL DISCECTOMY, FUSION C3-4, CERVICAL ARTHROPLASTY C4-5, C5-6  (EVOKES, SILVERCORD, MEDTRONIC)    Surgeon(s):  Jessica Davidson DO    Assistant:  First Assistant: Golden Holman    Anesthesia: General    Estimated Blood Loss (mL): 35    Complications: None    Specimens:   * No specimens in log *    Implants:  Implant Name Type Inv.  Item Serial No.  Lot No. LRB No. Used Action   DISC ARTIFICIAL H5MM AP14MM ML15MM CERV TI CERAMIC COMP - [id number]  DISC ARTIFICIAL H5MM AP14MM ML15MM CERV TI CERAMIC COMP  [employer] [facility] [id number] N/A 1 Implanted   DISC ARTIFICIAL H5MM AP14MM ML15MM CERV TI CERAMIC COMP - [id number]  DISC ARTIFICIAL H5MM AP14MM ML15MM CERV TI CERAMIC COMP  [employer] [facility] [id number] N/A 1 Implanted   GRAFT CELLR BNE MATRX INFLUX SPARC 2CC - [id number]  GRAFT CELLR BNE MATRX INFLUX SPARC 2CC  [employer]WD  N/A 1 Implanted   CAGE SPNL [id number] D12MM 6DEG ANT CERV INTBDY FUS Sandrine Baller - [id number]  CAGE SPNL [id number] D12MM 6DEG ANT CERV INTBDY FUS LORD W  [employer] [id number] N/A 1 Implanted   PLATE SPNL 1 LEVEL STD 10 MM ANTR CERV ASPCT - [id number]  PLATE SPNL 1 LEVEL STD 10 MM ANTR CERV ASPCT  [employer]  N/A 1 Implanted   SCREW SPNL ST 4X16 MM CERV DMITRIY ASPCT - [id number]  SCREW SPNL ST 4X16 MM CERV DMITRIY ASPCT  [employer]  N/A 2 Implanted   SCREW SPNL ST 4.5X14 MM CERV FIX RESCUE ASPCT - [id number]  SCREW SPNL ST 4.5X14 MM CERV FIX RESCUE ASPCT  [employer]  N/A 1 Implanted         Drains:   Closed/Suction Drain Anterior Neck Bulb (Active)   Site Description Clean, dry & intact 11/10/23 1300   Dressing

## 2023-11-10 NOTE — PROGRESS NOTES
Handover given to 1A RN from [facility] (PACU), all concerns and questions were addressed,. Transfer to 1A rm 104 per stretcher with neck collar in place and in a stable condition.

## 2023-11-10 NOTE — H&P (VIEW-ONLY)
History and Physical    Pt Name: Deuce Cazares  MRN: [id number]  YOB: 1965  Date of evaluation: 11/10/2023    SUBJECTIVE:   History of Chief Complaint:    Patient presents preprocedure for ANTERIOR CERVICAL DISCECTOMY, FUSION C3-4, CERVICAL ARTHROPLASTY C4-5, C5-6. Patient reports cervical spine pain or \"pressure\" sensation, as well as symptoms into the BUE. She reports that she mostly has pain, numbness/tingling, electrical shock type sensation at times with certain movements. She has lumbar spine pain as well. Patient has failed conservative treatment over time, has been scheduled for procedure today. Past Medical History    has a past medical history of Arthritis, Constipation, Heartburn, History of anesthesia reaction, Hyperlipidemia, Migraine, Under care of service provider, and Wears dentures. Past Surgical History   has a past surgical history that includes Hysterectomy; Ovary removal; Carpal tunnel release (Bilateral); bladder suspension; hernia repair; Colonoscopy; Endoscopy, colon, diagnostic; Stomach surgery; and Total hip arthroplasty (Left, 11/01/2022). Medications  Prior to Admission medications    Medication Sig Start Date End Date Taking? Authorizing Provider   omeprazole (PRILOSEC) 20 MG delayed release capsule Take 1 capsule by mouth Daily    Hernan Carroll MD   Ferrous Sulfate (IRON) 325 (65 Fe) MG TABS Take 1 tablet by mouth every other day    Hernan Carroll MD   vitamin B-12 (CYANOCOBALAMIN) 500 MCG tablet Take 1 tablet by mouth daily    Hernan Carroll MD Woodie Moros Oil (OMEGA-3) 500 MG CAPS Take 1 capsule by mouth daily    Hernan Carroll MD   COLLAGEN PO Take 1 tablet by mouth daily    Hernan Carroll MD   Calcium Carbonate-Vitamin D (CALCIUM-VITAMIN D3 PO) Take 1 tablet by mouth daily    Hernan Carroll MD     Allergies  is allergic to hydrocodone-acetaminophen and penicillins.   Family History  family history includes Asthma in her mother;

## 2023-11-10 NOTE — H&P
History and Physical    Pt Name: Adelina Paul  MRN: [id number]  YOB: 1965  Date of evaluation: 11/10/2023    SUBJECTIVE:   History of Chief Complaint:    Patient presents preprocedure for ANTERIOR CERVICAL DISCECTOMY, FUSION C3-4, CERVICAL ARTHROPLASTY C4-5, C5-6. Patient reports cervical spine pain or \"pressure\" sensation, as well as symptoms into the BUE. She reports that she mostly has pain, numbness/tingling, electrical shock type sensation at times with certain movements. She has lumbar spine pain as well. Patient has failed conservative treatment over time, has been scheduled for procedure today. Past Medical History    has a past medical history of Arthritis, Constipation, Heartburn, History of anesthesia reaction, Hyperlipidemia, Migraine, Under care of service provider, and Wears dentures. Past Surgical History   has a past surgical history that includes Hysterectomy; Ovary removal; Carpal tunnel release (Bilateral); bladder suspension; hernia repair; Colonoscopy; Endoscopy, colon, diagnostic; Stomach surgery; and Total hip arthroplasty (Left, 11/01/2022). Medications  Prior to Admission medications    Medication Sig Start Date End Date Taking? Authorizing Provider   omeprazole (PRILOSEC) 20 MG delayed release capsule Take 1 capsule by mouth Daily    Hernan Carroll MD   Ferrous Sulfate (IRON) 325 (65 Fe) MG TABS Take 1 tablet by mouth every other day    Hernan Carroll MD   vitamin B-12 (CYANOCOBALAMIN) 500 MCG tablet Take 1 tablet by mouth daily    Hernan Carroll MD Dawson Cinnamon Oil (OMEGA-3) 500 MG CAPS Take 1 capsule by mouth daily    Hernan Carroll MD   COLLAGEN PO Take 1 tablet by mouth daily    Hernan Carroll MD   Calcium Carbonate-Vitamin D (CALCIUM-VITAMIN D3 PO) Take 1 tablet by mouth daily    Hernan Carroll MD     Allergies  is allergic to hydrocodone-acetaminophen and penicillins.   Family History  family history includes Asthma in her mother;

## 2023-11-10 NOTE — ANESTHESIA POSTPROCEDURE EVALUATION
Department of Anesthesiology  Postprocedure Note    Patient: Deuce Cazares  MRN: [id number]  YOB: 1965  Date of evaluation: 11/10/2023      Procedure Summary     Date: 11/10/23 Room / Location: [address]    Anesthesia Start: 7277 Anesthesia Stop: 1987    Procedure: ANTERIOR CERVICAL DISCECTOMY, FUSION C3-4, CERVICAL ARTHROPLASTY C4-5, C5-6  (EVOKES, SILVERCORD, MEDTRONIC) (Spine Cervical) Diagnosis:       Stenosis of cervical spine with myelopathy (H[facility])      (Stenosis of cervical spine with myelopathy ([address]) [M48.02, G99.2])    Surgeons: Pierre Phoenix, DO Responsible Provider: Marino Dyson MD    Anesthesia Type: general ASA Status: 2          Anesthesia Type: No value filed.     Radha Phase I: Radha Score: 8    Radha Phase II:        Anesthesia Post Evaluation    Patient location during evaluation: PACU  Patient participation: complete - patient participated  Level of consciousness: awake  Pain score: 1  Airway patency: patent  Nausea & Vomiting: no nausea and no vomiting  Complications: no  Cardiovascular status: blood pressure returned to baseline and hemodynamically stable  Respiratory status: acceptable  Hydration status: euvolemic  Pain management: adequate

## 2023-11-10 NOTE — PROGRESS NOTES
Patient pressure dropping, patient laid supine fluids opened. Writer called dr. Dieter Merrill, orders for 500 bolus.

## 2023-11-11 ENCOUNTER — APPOINTMENT (OUTPATIENT)
Dept: GENERAL RADIOLOGY | Age: 58
DRG: 321 | End: 2023-11-11
Attending: NEUROLOGICAL SURGERY
Payer: MEDICAID

## 2023-11-11 VITALS
DIASTOLIC BLOOD PRESSURE: 63 MMHG | OXYGEN SATURATION: 94 % | WEIGHT: 180 LBS | BODY MASS INDEX: 30.73 KG/M2 | HEART RATE: 76 BPM | TEMPERATURE: 98.2 F | SYSTOLIC BLOOD PRESSURE: 106 MMHG | HEIGHT: 64 IN | RESPIRATION RATE: 16 BRPM

## 2023-11-11 LAB
ERYTHROCYTE [DISTWIDTH] IN BLOOD BY AUTOMATED COUNT: 14.5 % (ref 11.8–14.4)
HCT VFR BLD AUTO: 37.9 % (ref 36.3–47.1)
HGB BLD-MCNC: 11.7 G/DL (ref 11.9–15.1)
MCH RBC QN AUTO: 28.1 PG (ref 25.2–33.5)
MCHC RBC AUTO-ENTMCNC: 30.9 G/DL (ref 28.4–34.8)
MCV RBC AUTO: 91.1 FL (ref 82.6–102.9)
NRBC BLD-RTO: 0 PER 100 WBC
PLATELET # BLD AUTO: 184 K/UL (ref 138–453)
PMV BLD AUTO: 9.6 FL (ref 8.1–13.5)
RBC # BLD AUTO: 4.16 M/UL (ref 3.95–5.11)
WBC OTHER # BLD: 14.5 K/UL (ref 3.5–11.3)

## 2023-11-11 PROCEDURE — G0378 HOSPITAL OBSERVATION PER HR: HCPCS

## 2023-11-11 PROCEDURE — 97535 SELF CARE MNGMENT TRAINING: CPT

## 2023-11-11 PROCEDURE — 6370000000 HC RX 637 (ALT 250 FOR IP): Performed by: NURSE PRACTITIONER

## 2023-11-11 PROCEDURE — 6360000002 HC RX W HCPCS: Performed by: NURSE PRACTITIONER

## 2023-11-11 PROCEDURE — 97166 OT EVAL MOD COMPLEX 45 MIN: CPT

## 2023-11-11 PROCEDURE — 72040 X-RAY EXAM NECK SPINE 2-3 VW: CPT

## 2023-11-11 PROCEDURE — 97162 PT EVAL MOD COMPLEX 30 MIN: CPT

## 2023-11-11 PROCEDURE — 2580000003 HC RX 258: Performed by: NURSE PRACTITIONER

## 2023-11-11 PROCEDURE — 97116 GAIT TRAINING THERAPY: CPT

## 2023-11-11 PROCEDURE — 85027 COMPLETE CBC AUTOMATED: CPT

## 2023-11-11 PROCEDURE — 36415 COLL VENOUS BLD VENIPUNCTURE: CPT

## 2023-11-11 RX ORDER — CYCLOBENZAPRINE HCL 10 MG
10 TABLET ORAL 3 TIMES DAILY PRN
Qty: 30 TABLET | Refills: 0 | Status: SHIPPED | OUTPATIENT
Start: 2023-11-11 | End: 2023-11-21

## 2023-11-11 RX ORDER — OXYCODONE HYDROCHLORIDE 5 MG/1
5-10 TABLET ORAL EVERY 6 HOURS PRN
Qty: 56 TABLET | Refills: 0 | Status: SHIPPED | OUTPATIENT
Start: 2023-11-11 | End: 2023-11-18

## 2023-11-11 RX ADMIN — OXYCODONE HYDROCHLORIDE 5 MG: 5 TABLET ORAL at 07:53

## 2023-11-11 RX ADMIN — SODIUM CHLORIDE: 9 INJECTION, SOLUTION INTRAVENOUS at 04:57

## 2023-11-11 RX ADMIN — Medication 500 MCG: at 07:50

## 2023-11-11 RX ADMIN — ACETAMINOPHEN 650 MG: 325 TABLET ORAL at 12:57

## 2023-11-11 RX ADMIN — OXYCODONE HYDROCHLORIDE 5 MG: 5 TABLET ORAL at 12:57

## 2023-11-11 RX ADMIN — PANTOPRAZOLE SODIUM 40 MG: 40 TABLET, DELAYED RELEASE ORAL at 05:31

## 2023-11-11 RX ADMIN — Medication 2000 MG: at 07:50

## 2023-11-11 RX ADMIN — ACETAMINOPHEN 650 MG: 325 TABLET ORAL at 16:37

## 2023-11-11 RX ADMIN — DOCUSATE SODIUM 50 MG AND SENNOSIDES 8.6 MG 1 TABLET: 8.6; 5 TABLET, FILM COATED ORAL at 07:50

## 2023-11-11 RX ADMIN — OXYCODONE HYDROCHLORIDE 5 MG: 5 TABLET ORAL at 16:37

## 2023-11-11 RX ADMIN — ACETAMINOPHEN 650 MG: 325 TABLET ORAL at 05:31

## 2023-11-11 RX ADMIN — Medication 2000 MG: at 00:59

## 2023-11-11 RX ADMIN — SODIUM CHLORIDE, PRESERVATIVE FREE 10 ML: 5 INJECTION INTRAVENOUS at 07:53

## 2023-11-11 ASSESSMENT — PAIN SCALES - GENERAL
PAINLEVEL_OUTOF10: 7
PAINLEVEL_OUTOF10: 5
PAINLEVEL_OUTOF10: 6

## 2023-11-11 ASSESSMENT — PAIN DESCRIPTION - DESCRIPTORS: DESCRIPTORS: ACHING

## 2023-11-11 ASSESSMENT — PAIN DESCRIPTION - LOCATION: LOCATION: HEAD;SHOULDER

## 2023-11-11 NOTE — PLAN OF CARE
Drain Removal Note    Total output for drain today 20 ml    EVERARDO drain to anterior cervical site removed from suction, drain removed. Dry sterile occlusive dressing placed over drain hole with hemostasis. No complications, patient tolerated procedure well.     --  Noah Thompson CNP  4:14 PM EST
Problem: Discharge Planning  Goal: Discharge to home or other facility with appropriate resources  11/11/2023 1626 by Meera Rogers RN  Outcome: Completed  Flowsheets  Taken 11/11/2023 1539  Discharge to home or other facility with appropriate resources: Identify barriers to discharge with patient and caregiver  Taken 11/11/2023 1200  Discharge to home or other facility with appropriate resources: Identify barriers to discharge with patient and caregiver  11/11/2023 0850 by Meera Rogers RN  Outcome: Progressing  Flowsheets (Taken 11/11/2023 0705)  Discharge to home or other facility with appropriate resources: Identify barriers to discharge with patient and caregiver     Problem: Pain  Goal: Verbalizes/displays adequate comfort level or baseline comfort level  11/11/2023 1626 by Meera Rogers RN  Outcome: Completed  Flowsheets  Taken 11/11/2023 1530  Verbalizes/displays adequate comfort level or baseline comfort level: Encourage patient to monitor pain and request assistance  Taken 11/11/2023 1200  Verbalizes/displays adequate comfort level or baseline comfort level: Encourage patient to monitor pain and request assistance  11/11/2023 0850 by Meera Rogers RN  Outcome: Progressing  Flowsheets (Taken 11/11/2023 0800)  Verbalizes/displays adequate comfort level or baseline comfort level: Encourage patient to monitor pain and request assistance     Problem: Safety - Adult  Goal: Free from fall injury  11/11/2023 1626 by Meera Rogers RN  Outcome: Completed  11/11/2023 0850 by Meera Rogers RN  Outcome: Progressing  Flowsheets (Taken 11/11/2023 0848)  Free From Fall Injury: Instruct family/caregiver on patient safety     Problem: Skin/Tissue Integrity  Goal: Absence of new skin breakdown  Description: 1. Monitor for areas of redness and/or skin breakdown  2. Assess vascular access sites hourly  3. Every 4-6 hours minimum:  Change oxygen saturation probe site  4.   Every 4-6 hours:  If on nasal
Problem: Discharge Planning  Goal: Discharge to home or other facility with appropriate resources  Outcome: Progressing  Flowsheets  Taken 11/10/2023 2000 by Liu Dumont RN  Discharge to home or other facility with appropriate resources:   Identify barriers to discharge with patient and caregiver   Arrange for needed discharge resources and transportation as appropriate  Taken 11/10/2023 1709 by Juli Tirado RN  Discharge to home or other facility with appropriate resources: Identify barriers to discharge with patient and caregiver     Problem: Pain  Goal: Verbalizes/displays adequate comfort level or baseline comfort level  Outcome: Progressing  Flowsheets  Taken 11/10/2023 2313  Verbalizes/displays adequate comfort level or baseline comfort level: Encourage patient to monitor pain and request assistance  Taken 11/10/2023 1919  Verbalizes/displays adequate comfort level or baseline comfort level:   Encourage patient to monitor pain and request assistance   Assess pain using appropriate pain scale     Problem: Safety - Adult  Goal: Free from fall injury  Outcome: Progressing  Flowsheets (Taken 11/10/2023 2153)  Free From Fall Injury: Instruct family/caregiver on patient safety     Problem: Skin/Tissue Integrity  Goal: Absence of new skin breakdown  Description: 1. Monitor for areas of redness and/or skin breakdown  2. Assess vascular access sites hourly  3. Every 4-6 hours minimum:  Change oxygen saturation probe site  4. Every 4-6 hours:  If on nasal continuous positive airway pressure, respiratory therapy assess nares and determine need for appliance change or resting period.   Outcome: Progressing     Problem: ABCDS Injury Assessment  Goal: Absence of physical injury  Outcome: Progressing  Flowsheets (Taken 11/10/2023 2153)  Absence of Physical Injury: Implement safety measures based on patient assessment
Integrity  Goal: Absence of new skin breakdown  Description: 1. Monitor for areas of redness and/or skin breakdown  2. Assess vascular access sites hourly  3. Every 4-6 hours minimum:  Change oxygen saturation probe site  4. Every 4-6 hours:  If on nasal continuous positive airway pressure, respiratory therapy assess nares and determine need for appliance change or resting period.   11/11/2023 0850 by Meenakshi Reis RN  Outcome: Progressing  11/11/2023 0018 by Vargas Cruz RN  Outcome: Progressing     Problem: ABCDS Injury Assessment  Goal: Absence of physical injury  11/11/2023 0850 by Meenakshi Reis RN  Outcome: Progressing  Flowsheets (Taken 11/11/2023 0848)  Absence of Physical Injury: Implement safety measures based on patient assessment  11/11/2023 0018 by Vargas Cruz RN  Outcome: Progressing  Flowsheets (Taken 11/10/2023 2153)  Absence of Physical Injury: Implement safety measures based on patient assessment

## 2023-11-11 NOTE — PROGRESS NOTES
Physical Therapy  Facility/Department: [facility]  Physical Therapy Initial Assessment    Name: Agueda Nicholas  : 1965  MRN: [id number]  Date of Service: 2023  Obtained from medical chart:  \"S/p anterior cervical discectomy and fusion C3-4, C4-5 and C5-6 arthroplasty\"  Discharge Recommendations:  Patient would benefit from continued therapy after discharge   PT Equipment Recommendations  Equipment Needed: No      Patient Diagnosis(es): There were no encounter diagnoses. Past Medical History:  has a past medical history of Arthritis, Constipation, Heartburn, History of anesthesia reaction, Hyperlipidemia, Migraine, Under care of service provider, and Wears dentures. Past Surgical History:  has a past surgical history that includes Hysterectomy; Ovary removal; Carpal tunnel release (Bilateral); bladder suspension; hernia repair; Colonoscopy; Endoscopy, colon, diagnostic; Stomach surgery; Total hip arthroplasty (Left, 2022); cervical fusion (11/10/2023); and cervical fusion (11/10/2023). Assessment   Body Structures, Functions, Activity Limitations Requiring Skilled Therapeutic Intervention: Decreased functional mobility ; Decreased endurance;Decreased balance;Decreased strength  Assessment: Pt with mild mobility deficits requiring CGA to ambulate 200 feet with no AD, CGA to negotiate 8 stairs with a L sided handrail. Pt demonstrates mild balance and endurance deficits this date and would benefit from additional PT during inpatient hospital stay to address these deficits. Pt would benefit from assistance with mobility upon discharge, pt reports supportive dtr is able to provide 24/7 assistance as needed.   Therapy Prognosis: Good  Decision Making: Medium Complexity  Requires PT Follow-Up: Yes  Activity Tolerance  Activity Tolerance: Patient tolerated treatment well     Plan   Physical Therapy Plan  General Plan:  (4-5x/week)  Current Treatment Recommendations: Strengthening, Balance training,

## 2023-11-11 NOTE — PROGRESS NOTES
"Pt had open abdominal surgery a few weeks ago to try and get out a stone. Pt has chronic pancreatitis. States she thought she was getting better and then yesterday her abdominal pain got worse and has the \"burning\" like pancreatitis does. Also has N/V. Denies fever  " Occupational Therapy  Facility/Department: [facility]  Occupational Therapy Initial Assessment    Name: Cecilia Limon  : 1965  MRN: [id number]  Date of Service: 2023  S/p cervical fusion 11/10/23    Discharge Recommendations:  Patient would benefit from continued therapy after discharge     Patient Diagnosis(es): There were no encounter diagnoses. Past Medical History:  has a past medical history of Arthritis, Constipation, Heartburn, History of anesthesia reaction, Hyperlipidemia, Migraine, Under care of service provider, and Wears dentures. Past Surgical History:  has a past surgical history that includes Hysterectomy; Ovary removal; Carpal tunnel release (Bilateral); bladder suspension; hernia repair; Colonoscopy; Endoscopy, colon, diagnostic; Stomach surgery; Total hip arthroplasty (Left, 2022); cervical fusion (11/10/2023); and cervical fusion (11/10/2023). Assessment   Performance deficits / Impairments: Decreased functional mobility ; Decreased ADL status; Decreased balance;Decreased endurance;Decreased safe awareness;Decreased high-level IADLs  Assessment: Patient demonstrates good UE strength post-operatively, able to demo functional reah to collar and LEs to complete LB ADL tasks this date. Pt completed functional mobility within room and household distance at [facility] [place] with 0 LOB. OT provided patient with information on appropriate donning/doffing of cervical collar and wear to ensure good carryover of precautions with good success. Pt would benefit form acute OT services to promote independence and safety when engaging in ADL and IADL tasks.   Prognosis: Good  Decision Making: Medium Complexity  REQUIRES OT FOLLOW-UP: Yes  Activity Tolerance  Activity Tolerance: Patient Tolerated treatment well        Plan   Occupational Therapy Plan  Times Per Week: 1-3x total  Current Treatment Recommendations: Balance training, Safety education & training, Patient/Caregiver education & training, Equipment evaluation, education, & procurement, Endurance training, Functional mobility training, Self-Care / ADL, Home management training     Restrictions  Restrictions/Precautions  Required Braces or Orthoses?: Yes  Required Braces or Orthoses  Cervical: c-collar (Collar on at all times while out of bed ok to remove while resting in bed eating and drinking)  Position Activity Restriction  Other position/activity restrictions: s/p ANTERIOR CERVICAL DISCECTOMY, FUSION C3-4, CERVICAL ARTHROPLASTY C4-5, C5-6; act as lavonne, amb pt    Subjective   General  Patient assessed for rehabilitation services?: Yes  Family / Caregiver Present: No  General Comment  Comments: RN ok'd patient for OT session. Pt pleasant, cooperative and agreeable.  Pt reports 3/10 pain in the head and declined intervention from therapist. Improvement with sidelying     Social/Functional History  Social/Functional History  Lives With: Daughter, Family (7 yo and 15 yo)  Type of Home: House  Home Layout: One level, Able to Live on Main level with bedroom/bathroom  Home Access: Stairs to enter with rails  Entrance Stairs - Number of Steps: 5  Entrance Stairs - Rails: Left  Bathroom Shower/Tub: Tub/Shower unit  Bathroom Toilet: Standard  Bathroom Equipment: Shower chair, Grab bars in shower, Toilet raiser, Grab bars around toilet  Home Equipment: Walker, rolling  ADL Assistance: Independent  Homemaking Assistance: Independent  Homemaking Responsibilities: Yes  Meal Prep Responsibility: Primary  Laundry Responsibility: Primary  Cleaning Responsibility: Primary  Dependent Care Responsibility: Primary  Ambulation Assistance: Independent  Transfer Assistance: Independent  Active : Yes  Mode of Transportation: Car  Occupation: Retired  Type of Occupation: Nursing, ,   Leisure & Hobbies: reading, family  Additional Comments: Daughter is taking 2 weeks off to provide assistance PRN     Objective   Pulse:

## 2023-11-11 NOTE — CARE COORDINATION
Case Management Assessment  Initial Evaluation    Date/Time of Evaluation: 11/11/2023 11:04 AM  Assessment Completed by: Mayra Hernández RN    If patient is discharged prior to next notation, then this note serves as note for discharge by case management. Patient Name: Karla Yi                   YOB: 1965  Diagnosis: Stenosis of cervical spine with myelopathy ([address]) [M48.02, G99.2]                   Date / Time: 11/10/2023  5:26 AM    Patient Admission Status: Inpatient   Readmission Risk (Low < 19, Mod (19-27), High > 27): Readmission Risk Score: 8    Current PCP: Karan Samano MD  PCP verified by CM? (P) Yes Live Bean NP-new patient appt.)    Chart Reviewed: Yes      History Provided by: (P) Patient  Patient Orientation: (P) Alert and Oriented, Person, Place, Situation    Patient Cognition: (P) Alert    Hospitalization in the last 30 days (Readmission):  No    If yes, Readmission Assessment in CM Navigator will be completed. Advance Directives:      Code Status: Full Code   Patient's Primary Decision Maker is: (P) Legal Next of Kin    Primary Decision MakerRudalejandra Biggs Spouse - [phone]    Discharge Planning:    Patient lives with: (P) Spouse/Significant Other, Children Type of Home: (P) House  Primary Care Giver: (P) Self  Patient Support Systems include: (P) Children, Spouse/Significant Other   Current Financial resources: (P) Medicare  Current community resources: (P) None  Current services prior to admission: (P) None            Current DME:              Type of Home Care services:  (P) None    ADLS  Prior functional level: (P) Independent in ADLs/IADLs  Current functional level: (P) Assistance with the following:, Dressing, Mobility    PT AM-PAC: 20 /24  OT AM-PAC: 21 /24    Family can provide assistance at DC:     Would you like Case Management to discuss the discharge plan with any other family members/significant others, and if so, who? (P) No  Plans to Return to Discharge [facility] Case Management Department  Written by: Otto Lea RN    Patient Name: Ana Rosa Wheat  Attending Provider: Camila Mills DO  Admit Date: 11/10/2023  5:26 AM  MRN: [id number]  Account: [de-identified]                     : 1965  Discharge Date:       Disposition: home    Otto Lea RN

## 2023-11-11 NOTE — DISCHARGE INSTRUCTIONS
Anterior Cervical Discectomy and Fusion (ACDF) Surgery Discharge Instructions     Thank you for choosing [facility] and [address] [place] [address] for your surgical needs. The following instructions will help to ensure your comfort and that you are well prepared after your surgery. Post-Operative Visit:   The office is located at:    [facility]    [address], [address] [facility], main [facility], [address]    [phone]     Please also call your primary care physician to schedule an appointment for further evaluation and care. Nutrition:   You may resume your regular diet. It is normal to have a sore throat and some difficulty swallowing solid foods. You are able to resume your regular diet, however you may be more comfortable at first eating softer foods such as mashed potatoes, ice cream, jello or soup. Be sure to eat a well-balanced diet. Protein promotes wound healing. Pain medication and decreased activity can cause constipation. Drink 8-10 glasses of water a day, eat fresh fruits and vegetables, and add prunes, raisins and bran cereals to your diet if you do become constipated. A stool softener taken 1-2 times a day is helpful. Dulcolax suppositories or Fleets enemas are also available without a prescription. Call our office if the problem continues. Activity and Exercise:   No driving until you are seen in the office. Avoid riding in a car for the first two weeks until you come to the office for your scheduled follow-up. Start taking short, frequent walks in the beginning. [place], more frequent walks throughout the day are more beneficial than one long walk each day. You may gradually increase the distance; as tolerated. Your brace will help give support to your muscles while you walk. If your pain increases, you may be walking too much or too far. Try backing off for a day or two and then resume slowly.    No tenderness, odor, drainage or opening of the incision. Please check your incisions twice daily. Fevers greater than 101.5 degrees   Flu-like symptoms, chills, shakes, chest pain, shortness of breath, nausea, vomiting, diarrhea   New or increased pain, numbness or tingling in the arms or legs, as well as new or increased balance or coordination issues. New difficulty with urinating or holding your bladder or your bowels     *If you are unable to contact someone at the office and your symptoms persist or increase, call 911 or go to the emergency department.

## 2023-11-13 NOTE — PROGRESS NOTES
Attending Physician Statement  I have discussed the care of Sylvie Cruz 62 y.o. female, including pertinent history and exam findings, with the resident Dr. Latoya Grant MD.    History and Exam:   Chief Complaint   Patient presents with    1 Month Follow-Up     Adipex refill asking if there is anything stronger    Sleep Problem     Having problems staying asleep        Past Medical History:   Diagnosis Date    Arthritis     Heartburn     Hyperlipidemia      Allergies   Allergen Reactions    Penicillins Nausea Only     Nauseous       I have seen and examined the patient and the key elements of the encounter have been performed by me. BP Readings from Last 3 Encounters:   12/05/22 105/67   11/01/22 106/65   10/17/22 121/78     /67   Pulse 71   Wt 162 lb (73.5 kg)   BMI 28.70 kg/m²   Lab Results   Component Value Date    WBC 6.4 10/07/2022    HGB 10.7 (L) 11/01/2022    HCT 34.6 (L) 11/01/2022     10/07/2022    CHOL 211 (H) 04/13/2022    TRIG 153 (H) 04/13/2022    HDL 62 04/13/2022    ALT 14 10/07/2022    AST 19 10/07/2022     10/07/2022    K 4.1 10/07/2022     10/07/2022    CREATININE 0.67 10/07/2022    BUN 17 10/07/2022    CO2 27 10/07/2022    TSH 1.68 04/13/2022    LABA1C 5.2 10/07/2022     Lab Results   Component Value Date    LABALBU 4.1 10/07/2022     No results found for: IRON, TIBC, FERRITIN  Lab Results   Component Value Date    LDLCHOLESTEROL 118 04/13/2022     I agree with the assessment, plan and the diagnosis of    Diagnosis Orders   1. Constipation, unspecified constipation type  polyethylene glycol (GLYCOLAX) 17 GM/SCOOP powder    senna (SENOKOT) 8.6 MG tablet    Vitamin D 25 Hydroxy      2. Screen for colon cancer         . I agree with orders as documented by the resident. More than 25 minutes spent  in face to face encounter with the patient and more than half in counseling. Patient's questions were answered.    Patient Voiced understanding to the Called patient left voicemail that meds were sent in and to call office if she has any questions.   counseling. Return in about 6 weeks (around 1/16/2023).    (GC Modifier)-Dr. Carlos Masters, MD

## 2023-11-13 NOTE — OP NOTE
Operative Note      Patient: Karen Page  YOB: 1965  MRN: [id number]    Date of Procedure: 11/10/2023    Pre-Op Diagnosis Codes:     * Stenosis of cervical spine with myelopathy ([address]) [M48.02, G99.2]    Post-Op Diagnosis: Same       Procedure(s):  ANTERIOR CERVICAL DISCECTOMY, FUSION C3-4,   C3-4 PEKK Cage  C3-4 anterior plate  CERVICAL ARTHROPLASTY C4-5, C5-6    Use of fluoroscopy     Surgeon(s):  Shaniqua Aragon DO    Assistant:   First Assistant: Golden Kim    Anesthesia: General    Estimated Blood Loss (mL): 25    Complications: None    Specimens:   * No specimens in log *    Implants:  Implant Name Type Inv.  Item Serial No.  Lot No. LRB No. Used Action   DISC ARTIFICIAL H5MM AP14MM ML15MM CERV TI CERAMIC COMP - [id number]  DISC ARTIFICIAL H5MM AP14MM ML15MM CERV TI CERAMIC COMP  MED[facility] [facility]- [id number] N/A 1 Implanted   DISC ARTIFICIAL H5MM AP14MM ML15MM CERV TI CERAMIC COMP - [id number]  DISC ARTIFICIAL H5MM AP14MM ML15MM CERV TI CERAMIC COMP  [employer] [facility] DANEK- [id number] N/A 1 Implanted   GRAFT CELLR BNE MATRX INFLUX SPARC 2CC - [id number]  GRAFT CELLR BNE MATRX INFLUX SPARC 2CC  [employer]WD  N/A 1 Implanted   CAGE SPNL [id number] D12MM 6DEG ANT CERV INTBDY FUS [place] - [id number]  CAGE SPNL [id number] D12MM 6DEG ANT CERV INTBDY FUS LORD W  [employer] [id number] N/A 1 Implanted   PLATE SPNL 1 LEVEL STD 10 MM ANTR CERV ASPCT - [id number]  PLATE SPNL 1 LEVEL STD 10 MM ANTR CERV ASPCT  [employer]  N/A 1 Implanted   SCREW SPNL ST 4X16 MM CERV DMITRIY ASPCT - [id number]  SCREW SPNL ST 4X16 MM CERV DMITRIY ASPCT  [employer]  N/A 2 Implanted   SCREW SPNL ST 4.5X14 MM CERV FIX RESCUE ASPCT - [id number]  SCREW SPNL ST 4.5X14 MM CERV FIX RESCUE ASPCT  [employer]  N/A 1 Implanted         Drains:   [REMOVED] Closed/Suction Drain Anterior Neck Bulb (Removed)   Site Description Clean, dry &

## 2023-11-15 ENCOUNTER — TELEPHONE (OUTPATIENT)
Dept: FAMILY MEDICINE CLINIC | Age: 58
End: 2023-11-15

## 2023-11-15 NOTE — TELEPHONE ENCOUNTER
Care Transitions Initial Follow Up Call    Outreach made within 2 business days of discharge: No    Patient: Adelina Paul Patient : 1965   MRN: [id number]  Reason for Admission: There are no discharge diagnoses documented for the most recent discharge. Discharge Date: 23       Spoke with: patient - she is asking to wait to schedule since she is being readmitted on Monday for back surgery. Discharge department/facility: [address] [place] [address] Dr Ko Patient Contact:  Was patient able to fill all prescriptions: Yes  Was patient instructed to bring all medications to the follow-up visit: Yes  Is patient taking all medications as directed in the discharge summary?  Yes  Does patient understand their discharge instructions: Yes  Does patient have questions or concerns that need addressed prior to 7-14 day follow up office visit: no    Scheduled appointment with PCP within 7-14 days    Follow Up  Future Appointments   Date Time Provider [address] [place] [address]   2023  9:30 AM KOBY Duke CNP Neuro [facility]   2023 10:30 AM KOBY Duke CNP Neuro [facility]   2024  9:00 AM DO Yrn Cuevas Neuro [address]

## 2023-11-27 ENCOUNTER — TELEPHONE (OUTPATIENT)
Dept: NEUROSURGERY | Age: 58
End: 2023-11-27

## 2023-11-27 ENCOUNTER — OFFICE VISIT (OUTPATIENT)
Dept: NEUROSURGERY | Age: 58
End: 2023-11-27

## 2023-11-27 VITALS
SYSTOLIC BLOOD PRESSURE: 118 MMHG | WEIGHT: 184 LBS | HEART RATE: 76 BPM | OXYGEN SATURATION: 98 % | BODY MASS INDEX: 31.41 KG/M2 | HEIGHT: 64 IN | TEMPERATURE: 97.2 F | DIASTOLIC BLOOD PRESSURE: 79 MMHG

## 2023-11-27 DIAGNOSIS — Z98.1 S/P CERVICAL SPINAL FUSION: ICD-10-CM

## 2023-11-27 DIAGNOSIS — M48.02 STENOSIS OF CERVICAL SPINE WITH MYELOPATHY (HCC): Primary | ICD-10-CM

## 2023-11-27 DIAGNOSIS — G99.2 STENOSIS OF CERVICAL SPINE WITH MYELOPATHY (HCC): Primary | ICD-10-CM

## 2023-11-27 PROCEDURE — 99024 POSTOP FOLLOW-UP VISIT: CPT | Performed by: NURSE PRACTITIONER

## 2023-11-27 NOTE — TELEPHONE ENCOUNTER
Patient is needing a letter stating that she is in need of the support animal due to anxiety and depression.    Will need faxed to: [phone].

## 2023-11-27 NOTE — TELEPHONE ENCOUNTER
I gave her the letter I was able to earlier today, we do not treat or manage her anxiety or depression, that would need to come from her PCP and/or psychologist.

## 2023-11-27 NOTE — PROGRESS NOTES
[address]  [address]  [facility] [address], [address] [place] [id number]  Dept: [phone]    Patient:  Roz Honeycutt  YOB: 1965  Date: 11/27/23    The patient is a 62 y.o. female who presents today for consult of the following problems:     Chief Complaint   Patient presents with    Back Pain     2 weeks f/u         HPI:     Roz Honeycutt is a 62 y.o. female who presents to the office for post-op evaluation s/p ACDF C3-C4, cervical arthroplasty C4-C5, C5-C6. Incision healing well. Still with some difficulty swallowing, this is gradually improving. Still struggling with meats and breads. Postop pain well-controlled. Has been compliant with cervical collar. Strength 5/5  Sensation intact  Incision CDI    Date of surgery: 11/10/2023    Assessment and Plan:      1. Stenosis of cervical spine with myelopathy ([address])    2. S/P cervical spinal fusion          Plan: Continue cervical collar when out of bed, okay to remove to eat, sleep and to shower. Patient with upcoming lumbar laminectomy later this week, will see back in 2 weeks after surgery for postop visit, we will plan for initiating physical therapy at that time. Postop x-ray in 6 weeks. Followup: Return in about 6 weeks (around 1/8/2024), or if symptoms worsen or fail to improve. Prescriptions Ordered:  No orders of the defined types were placed in this encounter.        Orders Placed:  Orders Placed This Encounter   Procedures    XR CERVICAL SPINE (4-5 VIEWS)     Standing Status:   Future     Standing Expiration Date:   11/27/2024     Scheduling Instructions:      Please obtain upright AP; lateral views: neutral/flexion/extension     Order Specific Question:   Reason for exam:     Answer:   post-op cervical arthroplasty and fusion        Electronically signed by KOBY Pate CNP on 11/27/2023 at 9:38 AM    Please note that

## 2023-11-28 ENCOUNTER — PATIENT MESSAGE (OUTPATIENT)
Dept: NEUROSURGERY | Age: 58
End: 2023-11-28

## 2023-12-01 ENCOUNTER — ANESTHESIA (OUTPATIENT)
Dept: OPERATING ROOM | Age: 58
End: 2023-12-01
Payer: MEDICARE

## 2023-12-01 ENCOUNTER — HOSPITAL ENCOUNTER (OUTPATIENT)
Age: 58
Setting detail: OUTPATIENT SURGERY
Discharge: HOME | End: 2023-12-01
Attending: NEUROLOGICAL SURGERY | Admitting: NEUROLOGICAL SURGERY
Payer: MEDICARE

## 2023-12-01 ENCOUNTER — ANESTHESIA EVENT (OUTPATIENT)
Dept: OPERATING ROOM | Age: 58
End: 2023-12-01
Payer: MEDICARE

## 2023-12-01 ENCOUNTER — APPOINTMENT (OUTPATIENT)
Dept: GENERAL RADIOLOGY | Age: 58
End: 2023-12-01
Attending: NEUROLOGICAL SURGERY
Payer: MEDICARE

## 2023-12-01 VITALS
WEIGHT: 180 LBS | HEART RATE: 76 BPM | TEMPERATURE: 97.7 F | RESPIRATION RATE: 18 BRPM | DIASTOLIC BLOOD PRESSURE: 70 MMHG | HEIGHT: 64 IN | OXYGEN SATURATION: 94 % | SYSTOLIC BLOOD PRESSURE: 124 MMHG | BODY MASS INDEX: 30.73 KG/M2

## 2023-12-01 DIAGNOSIS — M48.062 SPINAL STENOSIS OF LUMBAR REGION WITH NEUROGENIC CLAUDICATION: Primary | ICD-10-CM

## 2023-12-01 PROCEDURE — 7100000010 HC PHASE II RECOVERY - FIRST 15 MIN: Performed by: NEUROLOGICAL SURGERY

## 2023-12-01 PROCEDURE — 2580000003 HC RX 258: Performed by: NEUROLOGICAL SURGERY

## 2023-12-01 PROCEDURE — 2720000010 HC SURG SUPPLY STERILE: Performed by: NEUROLOGICAL SURGERY

## 2023-12-01 PROCEDURE — 2500000003 HC RX 250 WO HCPCS: Performed by: NEUROLOGICAL SURGERY

## 2023-12-01 PROCEDURE — 6360000002 HC RX W HCPCS: Performed by: NURSE ANESTHETIST, CERTIFIED REGISTERED

## 2023-12-01 PROCEDURE — 7100000011 HC PHASE II RECOVERY - ADDTL 15 MIN: Performed by: NEUROLOGICAL SURGERY

## 2023-12-01 PROCEDURE — C9290 INJ, BUPIVACAINE LIPOSOME: HCPCS | Performed by: NEUROLOGICAL SURGERY

## 2023-12-01 PROCEDURE — 2580000003 HC RX 258: Performed by: ANESTHESIOLOGY

## 2023-12-01 PROCEDURE — 6370000000 HC RX 637 (ALT 250 FOR IP): Performed by: ANESTHESIOLOGY

## 2023-12-01 PROCEDURE — 6360000002 HC RX W HCPCS: Performed by: NEUROLOGICAL SURGERY

## 2023-12-01 PROCEDURE — 6370000000 HC RX 637 (ALT 250 FOR IP): Performed by: NEUROLOGICAL SURGERY

## 2023-12-01 PROCEDURE — 3600000014 HC SURGERY LEVEL 4 ADDTL 15MIN: Performed by: NEUROLOGICAL SURGERY

## 2023-12-01 PROCEDURE — 2500000003 HC RX 250 WO HCPCS: Performed by: NURSE ANESTHETIST, CERTIFIED REGISTERED

## 2023-12-01 PROCEDURE — 7100000001 HC PACU RECOVERY - ADDTL 15 MIN: Performed by: NEUROLOGICAL SURGERY

## 2023-12-01 PROCEDURE — 2580000003 HC RX 258: Performed by: NURSE ANESTHETIST, CERTIFIED REGISTERED

## 2023-12-01 PROCEDURE — 3700000001 HC ADD 15 MINUTES (ANESTHESIA): Performed by: NEUROLOGICAL SURGERY

## 2023-12-01 PROCEDURE — 3600000004 HC SURGERY LEVEL 4 BASE: Performed by: NEUROLOGICAL SURGERY

## 2023-12-01 PROCEDURE — 2709999900 HC NON-CHARGEABLE SUPPLY: Performed by: NEUROLOGICAL SURGERY

## 2023-12-01 PROCEDURE — 3700000000 HC ANESTHESIA ATTENDED CARE: Performed by: NEUROLOGICAL SURGERY

## 2023-12-01 PROCEDURE — 2500000003 HC RX 250 WO HCPCS: Performed by: ANESTHESIOLOGY

## 2023-12-01 PROCEDURE — 7100000000 HC PACU RECOVERY - FIRST 15 MIN: Performed by: NEUROLOGICAL SURGERY

## 2023-12-01 RX ORDER — PROCHLORPERAZINE EDISYLATE 5 MG/ML
5 INJECTION INTRAMUSCULAR; INTRAVENOUS
Status: CANCELLED | OUTPATIENT
Start: 2023-12-01 | End: 2023-12-02

## 2023-12-01 RX ORDER — TRAMADOL HYDROCHLORIDE 50 MG/1
50 TABLET ORAL
Status: CANCELLED | OUTPATIENT
Start: 2023-12-01 | End: 2023-12-02

## 2023-12-01 RX ORDER — MAGNESIUM HYDROXIDE 1200 MG/15ML
LIQUID ORAL CONTINUOUS PRN
Status: DISCONTINUED | OUTPATIENT
Start: 2023-12-01 | End: 2023-12-01 | Stop reason: HOSPADM

## 2023-12-01 RX ORDER — SODIUM CHLORIDE, SODIUM LACTATE, POTASSIUM CHLORIDE, CALCIUM CHLORIDE 600; 310; 30; 20 MG/100ML; MG/100ML; MG/100ML; MG/100ML
INJECTION, SOLUTION INTRAVENOUS CONTINUOUS PRN
Status: DISCONTINUED | OUTPATIENT
Start: 2023-12-01 | End: 2023-12-01 | Stop reason: SDUPTHER

## 2023-12-01 RX ORDER — GLYCOPYRROLATE 0.2 MG/ML
INJECTION INTRAMUSCULAR; INTRAVENOUS PRN
Status: DISCONTINUED | OUTPATIENT
Start: 2023-12-01 | End: 2023-12-01 | Stop reason: SDUPTHER

## 2023-12-01 RX ORDER — SODIUM CHLORIDE 9 MG/ML
INJECTION, SOLUTION INTRAVENOUS PRN
Status: CANCELLED | OUTPATIENT
Start: 2023-12-01

## 2023-12-01 RX ORDER — PHENYLEPHRINE HCL IN 0.9% NACL 1 MG/10 ML
SYRINGE (ML) INTRAVENOUS PRN
Status: DISCONTINUED | OUTPATIENT
Start: 2023-12-01 | End: 2023-12-01 | Stop reason: SDUPTHER

## 2023-12-01 RX ORDER — NEOSTIGMINE METHYLSULFATE 5 MG/5 ML
SYRINGE (ML) INTRAVENOUS PRN
Status: DISCONTINUED | OUTPATIENT
Start: 2023-12-01 | End: 2023-12-01 | Stop reason: SDUPTHER

## 2023-12-01 RX ORDER — LABETALOL HYDROCHLORIDE 5 MG/ML
10 INJECTION, SOLUTION INTRAVENOUS
Status: CANCELLED | OUTPATIENT
Start: 2023-12-01

## 2023-12-01 RX ORDER — DIPHENHYDRAMINE HYDROCHLORIDE 50 MG/ML
12.5 INJECTION INTRAMUSCULAR; INTRAVENOUS
Status: CANCELLED | OUTPATIENT
Start: 2023-12-01 | End: 2023-12-02

## 2023-12-01 RX ORDER — ALBUTEROL SULFATE 90 UG/1
2 AEROSOL, METERED RESPIRATORY (INHALATION) EVERY 6 HOURS PRN
Status: DISCONTINUED | OUTPATIENT
Start: 2023-12-01 | End: 2023-12-01 | Stop reason: HOSPADM

## 2023-12-01 RX ORDER — FENTANYL CITRATE 50 UG/ML
25 INJECTION, SOLUTION INTRAMUSCULAR; INTRAVENOUS EVERY 5 MIN PRN
Status: CANCELLED | OUTPATIENT
Start: 2023-12-01

## 2023-12-01 RX ORDER — SCOLOPAMINE TRANSDERMAL SYSTEM 1 MG/1
1 PATCH, EXTENDED RELEASE TRANSDERMAL ONCE
Status: DISCONTINUED | OUTPATIENT
Start: 2023-12-01 | End: 2023-12-01 | Stop reason: HOSPADM

## 2023-12-01 RX ORDER — SODIUM CHLORIDE 0.9 % (FLUSH) 0.9 %
5-40 SYRINGE (ML) INJECTION PRN
Status: CANCELLED | OUTPATIENT
Start: 2023-12-01

## 2023-12-01 RX ORDER — MIDAZOLAM HYDROCHLORIDE 2 MG/2ML
1 INJECTION, SOLUTION INTRAMUSCULAR; INTRAVENOUS EVERY 10 MIN PRN
Status: DISCONTINUED | OUTPATIENT
Start: 2023-12-01 | End: 2023-12-01 | Stop reason: HOSPADM

## 2023-12-01 RX ORDER — ROCURONIUM BROMIDE 10 MG/ML
INJECTION, SOLUTION INTRAVENOUS PRN
Status: DISCONTINUED | OUTPATIENT
Start: 2023-12-01 | End: 2023-12-01 | Stop reason: SDUPTHER

## 2023-12-01 RX ORDER — ONDANSETRON 2 MG/ML
INJECTION INTRAMUSCULAR; INTRAVENOUS PRN
Status: DISCONTINUED | OUTPATIENT
Start: 2023-12-01 | End: 2023-12-01 | Stop reason: SDUPTHER

## 2023-12-01 RX ORDER — ONDANSETRON 4 MG/1
4 TABLET, FILM COATED ORAL 3 TIMES DAILY PRN
Qty: 15 TABLET | Refills: 0 | Status: SHIPPED | OUTPATIENT
Start: 2023-12-01

## 2023-12-01 RX ORDER — SODIUM CHLORIDE, SODIUM LACTATE, POTASSIUM CHLORIDE, CALCIUM CHLORIDE 600; 310; 30; 20 MG/100ML; MG/100ML; MG/100ML; MG/100ML
INJECTION, SOLUTION INTRAVENOUS CONTINUOUS
Status: DISCONTINUED | OUTPATIENT
Start: 2023-12-01 | End: 2023-12-01 | Stop reason: HOSPADM

## 2023-12-01 RX ORDER — FENTANYL CITRATE 50 UG/ML
INJECTION, SOLUTION INTRAMUSCULAR; INTRAVENOUS PRN
Status: DISCONTINUED | OUTPATIENT
Start: 2023-12-01 | End: 2023-12-01 | Stop reason: SDUPTHER

## 2023-12-01 RX ORDER — METHOCARBAMOL 750 MG/1
750 TABLET, FILM COATED ORAL 3 TIMES DAILY
Qty: 21 TABLET | Refills: 0 | Status: SHIPPED | OUTPATIENT
Start: 2023-12-01 | End: 2023-12-08

## 2023-12-01 RX ORDER — FENTANYL CITRATE 50 UG/ML
25 INJECTION, SOLUTION INTRAMUSCULAR; INTRAVENOUS
Status: DISCONTINUED | OUTPATIENT
Start: 2023-12-01 | End: 2023-12-01 | Stop reason: HOSPADM

## 2023-12-01 RX ORDER — SODIUM CHLORIDE 0.9 % (FLUSH) 0.9 %
5-40 SYRINGE (ML) INJECTION EVERY 12 HOURS SCHEDULED
Status: CANCELLED | OUTPATIENT
Start: 2023-12-01

## 2023-12-01 RX ORDER — PROPOFOL 10 MG/ML
INJECTION, EMULSION INTRAVENOUS PRN
Status: DISCONTINUED | OUTPATIENT
Start: 2023-12-01 | End: 2023-12-01 | Stop reason: SDUPTHER

## 2023-12-01 RX ORDER — CEPHALEXIN 500 MG/1
500 CAPSULE ORAL 3 TIMES DAILY
Qty: 3 CAPSULE | Refills: 0 | Status: SHIPPED | OUTPATIENT
Start: 2023-12-01 | End: 2023-12-02

## 2023-12-01 RX ORDER — MIDAZOLAM HYDROCHLORIDE 2 MG/2ML
2 INJECTION, SOLUTION INTRAMUSCULAR; INTRAVENOUS
Status: CANCELLED | OUTPATIENT
Start: 2023-12-01 | End: 2023-12-02

## 2023-12-01 RX ORDER — HYDROCODONE BITARTRATE AND ACETAMINOPHEN 5; 325 MG/1; MG/1
1 TABLET ORAL EVERY 4 HOURS PRN
Qty: 42 TABLET | Refills: 0 | Status: SHIPPED | OUTPATIENT
Start: 2023-12-01 | End: 2023-12-08

## 2023-12-01 RX ORDER — LIDOCAINE HYDROCHLORIDE AND EPINEPHRINE 10; 10 MG/ML; UG/ML
INJECTION, SOLUTION INFILTRATION; PERINEURAL PRN
Status: DISCONTINUED | OUTPATIENT
Start: 2023-12-01 | End: 2023-12-01 | Stop reason: HOSPADM

## 2023-12-01 RX ORDER — FENTANYL CITRATE 50 UG/ML
50 INJECTION, SOLUTION INTRAMUSCULAR; INTRAVENOUS
Status: DISCONTINUED | OUTPATIENT
Start: 2023-12-01 | End: 2023-12-01 | Stop reason: HOSPADM

## 2023-12-01 RX ORDER — LIDOCAINE HYDROCHLORIDE 10 MG/ML
INJECTION, SOLUTION EPIDURAL; INFILTRATION; INTRACAUDAL; PERINEURAL PRN
Status: DISCONTINUED | OUTPATIENT
Start: 2023-12-01 | End: 2023-12-01 | Stop reason: SDUPTHER

## 2023-12-01 RX ORDER — DROPERIDOL 2.5 MG/ML
0.62 INJECTION, SOLUTION INTRAMUSCULAR; INTRAVENOUS
Status: CANCELLED | OUTPATIENT
Start: 2023-12-01 | End: 2023-12-02

## 2023-12-01 RX ORDER — ALBUTEROL SULFATE 2.5 MG/3ML
2.5 SOLUTION RESPIRATORY (INHALATION) EVERY 8 HOURS PRN
Status: DISCONTINUED | OUTPATIENT
Start: 2023-12-01 | End: 2023-12-01 | Stop reason: HOSPADM

## 2023-12-01 RX ORDER — DEXAMETHASONE SODIUM PHOSPHATE 10 MG/ML
INJECTION INTRAMUSCULAR; INTRAVENOUS PRN
Status: DISCONTINUED | OUTPATIENT
Start: 2023-12-01 | End: 2023-12-01 | Stop reason: SDUPTHER

## 2023-12-01 RX ADMIN — DEXAMETHASONE SODIUM PHOSPHATE 10 MG: 10 INJECTION INTRAMUSCULAR; INTRAVENOUS at 13:40

## 2023-12-01 RX ADMIN — Medication 2 G: at 13:51

## 2023-12-01 RX ADMIN — Medication 100 MCG: at 13:48

## 2023-12-01 RX ADMIN — ONDANSETRON 4 MG: 2 INJECTION INTRAMUSCULAR; INTRAVENOUS at 16:19

## 2023-12-01 RX ADMIN — PROPOFOL 100 MCG/KG/MIN: 10 INJECTION, EMULSION INTRAVENOUS at 13:28

## 2023-12-01 RX ADMIN — FENTANYL CITRATE 50 MCG: 50 INJECTION, SOLUTION INTRAMUSCULAR; INTRAVENOUS at 12:59

## 2023-12-01 RX ADMIN — SODIUM CHLORIDE, POTASSIUM CHLORIDE, SODIUM LACTATE AND CALCIUM CHLORIDE: 600; 310; 30; 20 INJECTION, SOLUTION INTRAVENOUS at 09:13

## 2023-12-01 RX ADMIN — LIDOCAINE HYDROCHLORIDE 50 MG: 10 INJECTION, SOLUTION EPIDURAL; INFILTRATION; INTRACAUDAL; PERINEURAL at 12:59

## 2023-12-01 RX ADMIN — GLYCOPYRROLATE 0.4 MG: 0.2 INJECTION INTRAMUSCULAR; INTRAVENOUS at 16:26

## 2023-12-01 RX ADMIN — ROCURONIUM BROMIDE 50 MG: 10 INJECTION, SOLUTION INTRAVENOUS at 12:59

## 2023-12-01 RX ADMIN — SODIUM CHLORIDE, POTASSIUM CHLORIDE, SODIUM LACTATE AND CALCIUM CHLORIDE: 600; 310; 30; 20 INJECTION, SOLUTION INTRAVENOUS at 13:05

## 2023-12-01 RX ADMIN — ROCURONIUM BROMIDE 20 MG: 10 INJECTION, SOLUTION INTRAVENOUS at 14:06

## 2023-12-01 RX ADMIN — PROPOFOL 150 MG: 10 INJECTION, EMULSION INTRAVENOUS at 12:59

## 2023-12-01 RX ADMIN — SODIUM CHLORIDE, POTASSIUM CHLORIDE, SODIUM LACTATE AND CALCIUM CHLORIDE: 600; 310; 30; 20 INJECTION, SOLUTION INTRAVENOUS at 16:19

## 2023-12-01 RX ADMIN — SUFENTANIL CITRATE 0.2 MCG/KG/HR: 50 INJECTION, SOLUTION EPIDURAL; INTRAVENOUS at 13:28

## 2023-12-01 RX ADMIN — Medication 2 MG: at 16:26

## 2023-12-01 RX ADMIN — PROPOFOL 50 MG: 10 INJECTION, EMULSION INTRAVENOUS at 16:38

## 2023-12-01 RX ADMIN — PHENYLEPHRINE HYDROCHLORIDE 25 MCG/MIN: 10 INJECTION INTRAVENOUS at 13:55

## 2023-12-01 ASSESSMENT — PAIN - FUNCTIONAL ASSESSMENT: PAIN_FUNCTIONAL_ASSESSMENT: NONE - DENIES PAIN

## 2023-12-01 NOTE — INTERVAL H&P NOTE
Pt Name: Lilliana Madrigal  MRN: [id number]  YOB: 1965  Date of evaluation: 12/1/2023    I have reviewed the patient's history and physical examination completed in pre-op on 11/10/23 prior to ANTERIOR CERVICAL DISCECTOMY, FUSION C3-4, CERVICAL ARTHROPLASTY C4-5, C5-6. No changes to history or on examination today, unless noted below. Patient presents today for scheduled L3-5 LUMBAR LAMINECTOMY, POSSIBLE FUSION. She denies pain today. She reports a very satisfactory post-operative course since her cervical spine surgery in November 2023 and verbalizes she looks forward to this surgery and recovery. She has Aspen cervical collar in use and her anterior cervical surgical incision is clean, dry and intact with no erythema, no edema, no drainage.      Kateryna Lindsey, KOBY - CNP  12/1/23  9:16 AM

## 2023-12-01 NOTE — DISCHARGE INSTRUCTIONS
Lumbar Spine Surgery Discharge Instructions     Thank you for choosing [facility] and [address] for your surgical needs. The following instructions will help to ensure your comfort and that you are well prepared after your surgery. Post-Operative Visit:   The office is located at:    [facility]    [address], [address] [facility], main floor    [facility], [address]    [phone]     Please also call your primary care physician to schedule an appointment for further evaluation and care. Diet:   You may resume your regular diet. Be sure to eat a well-balanced diet. Protein promotes wound healing. Pain medication and decreased activity can cause constipation. Drink 8-10 glasses of water a day, eat fresh fruits and vegetables, and add prunes, raisins and bran cereals to your diet if you do become constipated. A stool softener taken 1-2 times a day is helpful. Dulcolax suppositories or Fleets enemas are also available without a prescription. Call our office if the problem continues. Activity and Exercise:   No driving until you are seen in the office. Avoid riding in a car for the first two weeks until you come to the office for your scheduled follow-up. Start taking short, frequent walks in the beginning. [place], more frequent walks throughout the day are more beneficial than one long walk each day. You may gradually increase the distance; as tolerated. Your brace will help give support to your muscles while you walk. If your pain increases, you may be walking too much or too far. Try backing off for a day or two and then resume slowly. No lifting greater than 5 lbs (gallon of milk). No bending at the waist. Instead, bend at the knees and squat to pick something up. If physical therapy has been prescribed, you are not to perform range of motion, flexion, extension or lateral bending.    No baths, swimming or with urinating or holding your bladder or your bowels     *If you are unable to contact someone at the office and your symptoms persist or increase, call 911 or go to the emergency department.

## 2023-12-01 NOTE — PROGRESS NOTES
Neurosurgery Post op Progress Note      SUBJECTIVE: Status post, posterior lumbar laminectomy L3-5. Patient seen while in recovery. Patient is alert and oriented. The pain associated to her lower back is significantly better than prior to surgery. The pain to her lower back is well-controlled. She the presence of any numbness, tingling or paresthesia pains to either lower extremity. She appears to be alert and oriented. She denies the presence of any headache, nausea or emesis. OBJECTIVE      Physical exam   VITALS:    Vitals:    12/01/23 1700   BP: 130/81   Pulse:    Resp:    Temp:    SpO2:      INTAKE:    Intake/Output Summary (Last 24 hours) at 12/1/2023 1729  Last data filed at 12/1/2023 1632  Gross per 24 hour   Intake 1200 ml   Output 505 ml   Net 695 ml     URINARY CATHETER OUTPUT (Rubio): No Rubio catheter. DRAIN/TUBE OUTPUT: No drains to the surgical site. No evidence of DVT seen on physical exam.  Negative Chrissy's sign. No cords or calf tenderness. No significant calf/ankle edema. Neurological exam reveals Awake and alert, Responds to voice, and Responds to tactile stimuli  alert, oriented x3, affect appropriate, no focal neurological deficits, moves all extremities well, no involuntary movements, and reflexes at knee and ankle intact  alert, oriented, normal speech, no focal findings or movement disorder noted, motor and sensory grossly normal bilaterally, normal muscle tone, no tremors, strength 5/5.   the upper and lower extremities normal 5/5 strength in all tested muscle groups, no muscle wasting or atrophy, no fasciculations noted, no involuntary movements, no abnormalities of position, normal resting muscle tone, and no pronator drift  no sensory deficits noted, normal light touch sensation, and normal position sensation      Wound   Post op wound:  posterior lumbar spine   well approximated incision clean, dry, and no drainage Closed w/ Dermabond.   Island dressing overlying

## 2023-12-02 NOTE — OP NOTE
Operative Note      Patient: Deuce Cazares  YOB: 1965  MRN: [id number]    Date of Procedure: 12/1/2023    Pre-Op Diagnosis Codes:     * Spinal stenosis of lumbar region with neurogenic claudication [M48.062]    Post-Op Diagnosis: Same       Procedure(s):  L3-5 LUMBAR LAMINECTOMY    Surgeon(s):  Pierre Phoenix, DO    Assistant:   Physician Assistant: Amado Gusman PA-C    Anesthesia: General    Estimated Blood Loss (mL): less than 838     Complications: None    Specimens:   * No specimens in log *    Implants:  * No implants in log *      Drains:   Urinary Catheter 12/01/23 Rubio (Active)       [REMOVED] Closed/Suction Drain Anterior Neck Bulb (Removed)   Site Description Clean, dry & intact 11/11/23 1540   Dressing Status Clean, dry & intact 11/11/23 1540   Drainage Appearance Bloody 11/11/23 1540   Drain Status To bulb suction 11/11/23 1540   Output (ml) 15 ml 11/11/23 0626       [REMOVED] Urinary Catheter 11/10/23 Rubio (Removed)       Findings: significant facet arthropathy, lumbar stenosis. Detailed Description of Procedure:       Brief history and indication for surgery: This is a 62year old with medically refractory lumbar stenosis with neurogenic claudication and back pain with L3-5 stenosis. I recommend the above surgery as the best option for pain relief and improvement of function. I explained to the patient  the indications for surgery, the expected outcomes, the alternatives surgery, the risk which includes but not limited to bleeding, pain, infection, CSF leak, nerve damage, worsening symptoms,  need for more surgery, anesthesia and medical complications, death. All questions were answered and I was asked to proceed with surgery. Operative details:  Patient was brought in to OR by anesthesiologist.  Pathak Foard IVs were secured, the A-line installed. Patient was then induced and intubated.   Patient was positioned prone on a open Trell table withpadding supporting Appropriate/Calm

## 2023-12-02 NOTE — ANESTHESIA POSTPROCEDURE EVALUATION
Department of Anesthesiology  Postprocedure Note    Patient: Edward Prajapati  MRN: [id number]  [facility] [facility] 1965  Date of evaluation: 12/1/2023      Procedure Summary     Date: 12/01/23 Room / Location: [address]    Anesthesia Start: 1254 Anesthesia Stop: 1705    Procedure: L3-5 LUMBAR LAMINECTOMY (Spine Lumbar) Diagnosis:       Spinal stenosis of lumbar region with neurogenic claudication      (Spinal stenosis of lumbar region with neurogenic claudication [M48.062])    Surgeons: Maryjane Leiva DO Responsible Provider: Akil Segundo MD    Anesthesia Type: General ASA Status: 2          Anesthesia Type: General    Radha Phase I: Radha Score: 10    Radha Phase II: Radha Score: 10      Anesthesia Post Evaluation    Patient location during evaluation: PACU  Patient participation: complete - patient participated  Level of consciousness: awake and alert  Airway patency: patent  Nausea & Vomiting: no nausea and no vomiting  Complications: no  Cardiovascular status: blood pressure returned to baseline  Respiratory status: acceptable  Hydration status: stable  Pain management: adequate

## 2023-12-14 ENCOUNTER — TELEPHONE (OUTPATIENT)
Dept: NEUROSURGERY | Age: 58
End: 2023-12-14

## 2023-12-15 ENCOUNTER — OFFICE VISIT (OUTPATIENT)
Dept: NEUROSURGERY | Age: 58
End: 2023-12-15

## 2023-12-15 VITALS
HEIGHT: 64 IN | HEART RATE: 69 BPM | WEIGHT: 179 LBS | BODY MASS INDEX: 30.56 KG/M2 | DIASTOLIC BLOOD PRESSURE: 71 MMHG | SYSTOLIC BLOOD PRESSURE: 104 MMHG

## 2023-12-15 DIAGNOSIS — M48.02 STENOSIS OF CERVICAL SPINE WITH MYELOPATHY (HCC): Primary | ICD-10-CM

## 2023-12-15 DIAGNOSIS — G99.2 STENOSIS OF CERVICAL SPINE WITH MYELOPATHY (HCC): Primary | ICD-10-CM

## 2023-12-15 DIAGNOSIS — Z98.1 S/P CERVICAL SPINAL FUSION: ICD-10-CM

## 2023-12-15 DIAGNOSIS — M48.062 SPINAL STENOSIS OF LUMBAR REGION WITH NEUROGENIC CLAUDICATION: ICD-10-CM

## 2023-12-15 DIAGNOSIS — Z98.890 S/P LAMINECTOMY: ICD-10-CM

## 2023-12-15 PROCEDURE — 99024 POSTOP FOLLOW-UP VISIT: CPT | Performed by: NURSE PRACTITIONER

## 2023-12-15 NOTE — PROGRESS NOTES
[address]  [address]  [facility] [address] [place] [address], [address] [place] [address] Road [address]  Dept: [phone]    Patient:  Sarah Schulte  YOB: 1965  Date: 12/15/23    The patient is a 62 y.o. female who presents today for consult of the following problems:     Chief Complaint   Patient presents with    Post-Op Check         HPI:     Sarah Schulte is a 62 y.o. female who presents to the office for post-op evaluation s/p L3-L5 lumbar laminectomy. Patient is doing well. Incision healing well. No drainage, fevers, chills. Does feel some improvement to the leg symptoms. Has been able to mobilize more, has been able to walk without leg pain/weakness. Is also continuing to recover from her cervical fusion and arthroplasty. Cervical collar in place. Still with some mild dysphagia, it is slowly, gradually improving. Still has to be careful in terms of what she is eating, difficult time swallowing meats, breads. Sensation intact  Strength 5/5  Incisions both CDI    Date of surgery: ACDF C3-4, arthroplasty C4-5, C5-6 11/10/2023; L3-5 lumbar laminectomy 12/1/2023    Assessment and Plan:      1. Stenosis of cervical spine with myelopathy ([address])    2. Spinal stenosis of lumbar region with neurogenic claudication    3. S/P cervical spinal fusion    4. S/P laminectomy          Plan: Continue cervical collar at this time when out of bed, okay to remove to eat, sleep and to shower. Continue to closely monitor swallowing, if symptoms do not continue gradually improving-contact office. Referral provided for initiation of physical therapy for lower back, extremity stretching, strengthening, range of motion. Cervical collar to remain in place at this time. Followup: Return in about 4 weeks (around 1/12/2024), or if symptoms worsen or fail to improve.     Prescriptions Ordered:  No orders of the defined types were placed in

## 2024-01-02 ENCOUNTER — HOSPITAL ENCOUNTER (OUTPATIENT)
Dept: GENERAL RADIOLOGY | Age: 59
Discharge: HOME | End: 2024-01-04
Payer: MEDICARE

## 2024-01-02 ENCOUNTER — HOSPITAL ENCOUNTER (OUTPATIENT)
Age: 59
Discharge: HOME | End: 2024-01-04
Payer: MEDICARE

## 2024-01-02 ENCOUNTER — HOSPITAL ENCOUNTER (OUTPATIENT)
Dept: PHYSICAL THERAPY | Age: 59
Setting detail: THERAPIES SERIES
Discharge: HOME | End: 2024-01-02
Payer: MEDICARE

## 2024-01-02 DIAGNOSIS — M48.02 STENOSIS OF CERVICAL SPINE WITH MYELOPATHY (HCC): ICD-10-CM

## 2024-01-02 DIAGNOSIS — G99.2 STENOSIS OF CERVICAL SPINE WITH MYELOPATHY (HCC): ICD-10-CM

## 2024-01-02 DIAGNOSIS — Z98.1 S/P CERVICAL SPINAL FUSION: ICD-10-CM

## 2024-01-02 PROCEDURE — 72050 X-RAY EXAM NECK SPINE 4/5VWS: CPT

## 2024-01-02 PROCEDURE — 97161 PT EVAL LOW COMPLEX 20 MIN: CPT

## 2024-01-02 PROCEDURE — 97110 THERAPEUTIC EXERCISES: CPT

## 2024-01-02 PROCEDURE — 97530 THERAPEUTIC ACTIVITIES: CPT

## 2024-01-02 NOTE — FLOWSHEET NOTE
Brandon Fall Risk Assessment    Patient Name:  Nena Casanova  : 1965    Risk Factor Scale  Score   History of Falls [] Yes  [x] No 25  0 0   Secondary Diagnosis [x] Yes  [] No 15  0 15   Ambulatory Aid [] Furniture  [] Crutches/cane/walker  [x] None/bedrest/wheelchair/nurse 30  15  0 0   IV/Heparin Lock [] Yes  [x] No 20  0 0   Gait/Transferring [] Impaired  [] Weak  [x] Normal/bedrest/immobile 20  10  0 0   Mental Status [] Forgets limitations  [x] Oriented to own ability 15  0 0      Total: 15     Based on the Assessment score: check the appropriate box.    [x]  No intervention needed   Low =   Score of 0-24    []  Use standard prevention interventions Moderate =  Score of 24-44   [] Give patient handout and discuss fall prevention strategies   [] Establish goal of education for patient/family RE: fall prevention strategies    []  Use high risk prevention interventions High = Score of 45 and higher   [] Give patient handout and discuss fall prevention strategies   [] Establish goal of education for patient/family Re: fall prevention strategies   [] Discuss lifeline / other resources    Electronically signed by:   Nella Jin PT  Date: 2024

## 2024-01-09 ENCOUNTER — HOSPITAL ENCOUNTER (OUTPATIENT)
Dept: PHYSICAL THERAPY | Age: 59
Setting detail: THERAPIES SERIES
Discharge: HOME | End: 2024-01-09
Payer: MEDICARE

## 2024-01-09 PROCEDURE — 97110 THERAPEUTIC EXERCISES: CPT

## 2024-01-09 NOTE — FLOWSHEET NOTE
[x] [facility]  Outpatient Rehabilitation &  Therapy  [address]  [phone]  F: [phone] [] [facility]  Outpatient Rehabilitation &  Therapy  [address]   [address]  P: [phone]  F: [phone] [] [facility]  Outpatient Rehabilitation &  Therapy  [address]  P: [phone]  F: [phone] [] [facility]  Outpatient Rehabilitation &  Therapy  [address]  P: [phone]  F: [phone] [] [facility]  Outpatient Rehabilitation &  Therapy  [address]   P: [phone]  F: [phone]  [] [facility]  Outpatient Rehabilitation &  Therapy  [address]   P: [phone]  F: [phone] [] [facility]  Outpatient Rehabilitation &  Therapy  [address]  P: [phone]  F: [phone] [] [facility]  Outpatient Rehabilitation &  Therapy  [address]  P: [phone]  F: [phone] [] [facility]  Outpatient Rehabilitation &  Therapy  [address]  P: [phone]  F: [phone]      Physical Therapy Daily Treatment Note    Date:  2024  Patient Name:  Nena Casanova    :  1965  MRN: [id number]   Physician: Bekah WHALEN-CNP                      Insurance: [facility] Medicare/ [place] Medicaid (BMN)  Medical Diagnosis:   M48.02, G99.2 (ICD-10-CM) - Stenosis of cervical spine with myelopathy (HCC)   M48.062 (ICD-10-CM) - Spinal stenosis of lumbar region with neurogenic claudication   Z98.1 (ICD-10-CM) - S/P cervical spinal fusion   Z98.890 (ICD-10-CM) - S/P laminectomy   Rehab Codes: pain M25.552, M54.59, posture R29.3, weakness M62.552, myofascial M79.1, M62.830  Onset Date: 11-10-23                        Next 's appt.: 24  Visit# / total visits: ; Progress note for Medicare patient due at visit 10     Cancels/No

## 2024-01-11 ENCOUNTER — HOSPITAL ENCOUNTER (OUTPATIENT)
Dept: PHYSICAL THERAPY | Age: 59
Setting detail: THERAPIES SERIES
Discharge: HOME | End: 2024-01-11
Payer: MEDICARE

## 2024-01-11 PROCEDURE — 97110 THERAPEUTIC EXERCISES: CPT

## 2024-01-11 PROCEDURE — 97140 MANUAL THERAPY 1/> REGIONS: CPT

## 2024-01-11 NOTE — FLOWSHEET NOTE
Shows: 0/0    Subjective:    Pain:  [] Yes  [x] No Location:  Left buttock area Pain Rating: (0-10 scale) 1/10  Pain altered Tx:  [x] No  [] Yes  Action:  Comments:  Woke up painfree. States during night she found herself on her stomach but no increased neck pain.      Objective:  Modalities:    Precautions: S/P cervical spinal fusion 11-10-23- wearing cervical brace - no therapy on neck yet,   L3-5 lumbar laminectomy 12/1/2023, PAPO L 2022  Exercises:  Exercise Reps/ Time Weight/ Level Comments   posture x   Reviewed in sitting with lumbar roll and why this aligned position is beneficial, use of pillow under or between knees with lying positions for support of back muscles and spine (Thera Act)   Transfer sit to stand x   Scoot out, chest up, back straight.   Nustep 5 min L4  Inc level          Standing      Calf and soleus stretches w/wedge 5 x 20 sec  added   Heel raises 10 x  added   HS curls  10 x  added   Hip 4 way 10 x  added   marching 10 x  added   Almost sit squat 10 x  added         Sitting         marching 15x       LAQ 15 x        hip add ball squeeze  10 x       Hip abd  10 x     Hip ER stretch       Piriformis stretch      HS stretch  5 x 20 sec           DLS - bracing x     - alt arms 10 x      - alt legs 10 x     - opposite arms/legs 10 x           Supine         Hip ER stretch     Gentle left hip   Piriformis stretch    Gentle left hip   HS stretch        bridging 10 x    bracing abdominal                       Other:  Tender left SI and piriformis  Manual: Supine - left leg longer noted -Educated on SI joint and muscle energy - Trial Muscle energy shot gun approach for left SI x 2 with good tolerance and = leg length after.  Notes once up feels better without pain in left SI area.       Focus for treatments: Posture, core and stability, Stretching and strengthen left LE, myofascial to piriformis and lumbar paraspinals as needed.         Treatment Charges: Mins Units   []  Modalities     [x]  Ther

## 2024-01-16 ENCOUNTER — HOSPITAL ENCOUNTER (OUTPATIENT)
Dept: PHYSICAL THERAPY | Age: 59
Setting detail: THERAPIES SERIES
Discharge: HOME | End: 2024-01-16
Payer: MEDICARE

## 2024-01-16 PROCEDURE — 97110 THERAPEUTIC EXERCISES: CPT

## 2024-01-16 PROCEDURE — 97140 MANUAL THERAPY 1/> REGIONS: CPT

## 2024-01-16 NOTE — FLOWSHEET NOTE
Stretching and strengthen left LE, myofascial to piriformis and lumbar paraspinals as needed.         Treatment Charges: Mins Units   []  Modalities     [x]  Ther Exercise 44 3   [x]  Manual Therapy 10 1   []  Ther Activities     []  Neuro Re-ed     []  Vasocompression     [] Gait     []  Other     Total Billable time 54 4       Assessment: [x] Progressing toward goals. SI appears aggravated so trial muscle energy as above teaching self maneuver sitting and supine. Exercises as above adding SLS foe increased stability - using SI belt with standing ex today with good results noting less pain and better tolerance to exercises.     [] No change.     [x] Other: Left with pain down to 1/10 and no limp.   [x] Patient would continue to benefit from skilled physical therapy services in order to:  Learn proper posture for optimal positioning and healing of spine, increase strength of left LE, reduce pain with manual work and exercises, improve core and stability with exercises.       STG: (to be met in 10 treatments)  ? Pain: Keep left leg pain at 1/10 or less most times for improved tolerance to activities.  ? ROM: No loss of ROM  ? Strength: Left hip tests at 4+ or better for improved tolerance to ADL's, Tolerate  ? Function: Able to tolerate walking for 30-40 min or more, Able to sit for an hour or more without aggravation  Min to no tender/spasms in lumbar paraspinals and able to manage independently if recur.  Show consistent proper posture for improved tolerance to sitting and standing.  LTG: (to be met in 12 treatments)  Oswestry improves to 32% impaired or better.   Patient to be independent with home exercise program as demonstrated by performance with correct form without cues.        Patient goals:Pain free and movement    Pt. Education:  [x] Yes  [] No  [x] Reviewed Prior HEP/Ed  Method of Education: [x] Verbal  [x] Demo Muscle energy and standing ex [] Written  Comprehension of Education:  [x] Verbalizes

## 2024-01-18 ENCOUNTER — HOSPITAL ENCOUNTER (OUTPATIENT)
Dept: PHYSICAL THERAPY | Age: 59
Setting detail: THERAPIES SERIES
Discharge: HOME | End: 2024-01-18
Payer: MEDICARE

## 2024-01-18 PROCEDURE — 97110 THERAPEUTIC EXERCISES: CPT

## 2024-01-18 PROCEDURE — 97140 MANUAL THERAPY 1/> REGIONS: CPT

## 2024-01-18 NOTE — FLOWSHEET NOTE
[x] [facility]  Outpatient Rehabilitation &  Therapy  [address]  [phone]  F: [phone] [] [facility]  Outpatient Rehabilitation &  Therapy  [address]  P: [phone]  F: [phone] [] [facility]  Outpatient Rehabilitation &  Therapy  [address]  P: [phone]  F: [phone] [] [facility]  Outpatient Rehabilitation &  Therapy  [address]  P: [phone]  F: [phone] [] [facility]  Outpatient Rehabilitation &  Therapy  [address]   P: [phone]  F: [phone]  [] [facility]  Outpatient Rehabilitation &  Therapy  [address]   P: [phone]  F: [phone] [] [facility]  Outpatient Rehabilitation &  Therapy  [address]  P: [phone]  F: [phone] [] [facility]  Outpatient Rehabilitation &  Therapy  [address]  P: [phone]  F: [phone] [] [facility]  Outpatient Rehabilitation &  Therapy  [address]  P: [phone]  F: [phone]      Physical Therapy Daily Treatment Note    Date:  2024  Patient Name:  Nena Casanova    :  1965  MRN: [id number]   Physician: Bekah WHALEN-CNP                      Insurance: [facility] Medicare/ [place] Medicaid (BMN)  Medical Diagnosis:   M48.02, G99.2 (ICD-10-CM) - Stenosis of cervical spine with myelopathy (HCC)   M48.062 (ICD-10-CM) - Spinal stenosis of lumbar region with neurogenic claudication   Z98.1 (ICD-10-CM) - S/P cervical spinal fusion   Z98.890 (ICD-10-CM) - S/P laminectomy   Rehab Codes: pain M25.552, M54.59, posture R29.3, weakness M62.552, myofascial M79.1, M62.830  Onset Date: 11-10-23                        Next 's appt.: 24  Visit# / total visits: ; Progress note for Medicare patient due at visit 10     Cancels/No

## 2024-01-23 ENCOUNTER — HOSPITAL ENCOUNTER (OUTPATIENT)
Dept: PHYSICAL THERAPY | Age: 59
Setting detail: THERAPIES SERIES
Discharge: HOME | End: 2024-01-23
Payer: MEDICARE

## 2024-01-23 NOTE — FLOWSHEET NOTE
[x] [facility]  Outpatient Rehabilitation &  Therapy  [address]  [phone]  F: [phone] [] [facility]  Outpatient Rehabilitation &  Therapy  [address]  P: [phone]  F: [phone] [] [facility]  Outpatient Rehabilitation &  Therapy  [address]  P: [phone]  F: [phone] [] [facility]  Outpatient Rehabilitation &  Therapy  [address]  P: [phone]  F: [phone] [] [facility]  Outpatient Rehabilitation &  Therapy  [address]   P: [phone]  F: [phone]  [] [facility]  Outpatient Rehabilitation &  Therapy  [address]   P: [phone]  F: [phone] [] [facility]  Outpatient Rehabilitation &  Therapy  [address]  P: [phone]  F: [phone] [] [facility]  Outpatient Rehabilitation &  Therapy  [address]  P: [phone]  F: [phone] [] [facility]  Outpatient Rehabilitation &  Therapy  [address]  P: [phone]  F: [phone]  [] [facility] Outpatient Rehabilitation &  Therapy  [address]  P: [phone]  F: [phone]     Therapy Cancel/No Show note    Date: 2024  Patient: Nena Casanova  : 1965  MRN: [id number]    Cancels/No Shows to date:     For today's appointment patient:    [x]  Cancelled    [] Rescheduled appointment    [] No-show     Reason given by patient:    []  Patient ill    []  Conflicting appointment    [] No transportation      [] Conflict with work    [] No reason given    [x] Weather related -- does not feel comfortable driving in the weather.    [] COVID-19    [] Other:      Comments:        [x] Next appointment was previously confirmed    Electronically signed by: Jenny Epps, PT

## 2024-01-24 ENCOUNTER — PATIENT MESSAGE (OUTPATIENT)
Dept: NEUROSURGERY | Age: 59
End: 2024-01-24

## 2024-01-24 NOTE — TELEPHONE ENCOUNTER
From: Nena Casanova  To: Dr. Ale Aguilera  Sent: 1/24/2024 11:18 AM EST  Subject: Lower back butt area    I don’t know if this is a physical therapy issue of pulled muscle or what but in my lower buttocks area lower back area it’s like there’s a pulled muscle that hurts to stand up straight and takes my breath away. I’ll try getting hold of physical therapy couldn’t leave a message, so I’m wondering if I have a concern or not just making sure everything‘s OK should I continue therapy or wait until I’m seen by you next week? Therapy is scheduled for tomorrow at 8 AM   74

## 2024-01-25 ENCOUNTER — HOSPITAL ENCOUNTER (OUTPATIENT)
Dept: PHYSICAL THERAPY | Age: 59
Setting detail: THERAPIES SERIES
Discharge: HOME | End: 2024-01-25
Payer: MEDICARE

## 2024-01-25 PROCEDURE — 97110 THERAPEUTIC EXERCISES: CPT

## 2024-01-25 PROCEDURE — 97140 MANUAL THERAPY 1/> REGIONS: CPT

## 2024-01-25 NOTE — FLOWSHEET NOTE
[x] [facility]  Outpatient Rehabilitation &  Therapy  [address]  [phone]  F: [phone] [] [facility]  Outpatient Rehabilitation &  Therapy  [address]   [address]  P: [phone]  F: [phone] [] [place]  Outpatient Rehabilitation &  Therapy  [address]  P: [phone]  F: [phone] [] [facility]  Outpatient Rehabilitation &  Therapy  [address]  P: [phone]  F: [phone] [] [facility]  Outpatient Rehabilitation &  Therapy  [address]   P: [phone]  F: [phone]  [] [facility]  Outpatient Rehabilitation &  Therapy  [address]   P: [phone]  F: [phone] [] [facility]  Outpatient Rehabilitation &  Therapy  [address]  P: [phone]  F: [phone] [] [facility]  Outpatient Rehabilitation &  Therapy  [address]  P: [phone]  F: [phone] [] [facility]  Outpatient Rehabilitation &  Therapy  [address]  P: [phone]  F: [phone]      Physical Therapy Daily Treatment Note    Date:  2024  Patient Name:  Nena Casanova    :  1965  MRN: [id number]   Physician: Bekah WHALEN-CNP                      Insurance: [facility] Medicare/ [place] Medicaid (BMN)  Medical Diagnosis:   M48.02, G99.2 (ICD-10-CM) - Stenosis of cervical spine with myelopathy (HCC)   M48.062 (ICD-10-CM) - Spinal stenosis of lumbar region with neurogenic claudication   Z98.1 (ICD-10-CM) - S/P cervical spinal fusion   Z98.890 (ICD-10-CM) - S/P laminectomy   Rehab Codes: pain M25.552, M54.59, posture R29.3, weakness M62.552, myofascial M79.1, M62.830  Onset Date: 11-10-23                        Next 's appt.: 24  Visit# / total visits: ; Progress note for Medicare patient due at visit 10     Cancels/No

## 2024-01-30 ENCOUNTER — HOSPITAL ENCOUNTER (OUTPATIENT)
Dept: PHYSICAL THERAPY | Age: 59
Setting detail: THERAPIES SERIES
Discharge: HOME | End: 2024-01-30
Payer: MEDICARE

## 2024-01-30 ENCOUNTER — OFFICE VISIT (OUTPATIENT)
Dept: NEUROSURGERY | Age: 59
End: 2024-01-30
Payer: MEDICARE

## 2024-01-30 VITALS
HEART RATE: 66 BPM | SYSTOLIC BLOOD PRESSURE: 124 MMHG | WEIGHT: 188.4 LBS | BODY MASS INDEX: 32.17 KG/M2 | HEIGHT: 64 IN | DIASTOLIC BLOOD PRESSURE: 62 MMHG

## 2024-01-30 DIAGNOSIS — Z98.1 S/P CERVICAL SPINAL FUSION: ICD-10-CM

## 2024-01-30 DIAGNOSIS — Z98.890 S/P LAMINECTOMY: Primary | ICD-10-CM

## 2024-01-30 PROCEDURE — 97110 THERAPEUTIC EXERCISES: CPT

## 2024-01-30 PROCEDURE — 99213 OFFICE O/P EST LOW 20 MIN: CPT | Performed by: NEUROLOGICAL SURGERY

## 2024-01-30 NOTE — PROGRESS NOTES
(CYANOCOBALAMIN) 500 MCG tablet Take 1 tablet by mouth daily  Patient not taking: Reported on 12/15/2023    Hernan Carroll MD   Krill Oil (OMEGA-3) 500 MG CAPS Take 1 capsule by mouth daily  Patient not taking: Reported on 12/15/2023    Hernan Carroll MD   COLLAGEN PO Take 1 tablet by mouth daily  Patient not taking: Reported on 12/15/2023    Hernan Carroll MD   Calcium Carbonate-Vitamin D (CALCIUM-VITAMIN D3 PO) Take 1 tablet by mouth daily  Patient not taking: Reported on 12/15/2023    Hernan Carroll MD       Current Medications:   No current facility-administered medications for this visit.    Review of system: negative, otherwise described in HPI  PHYSICAL EXAM:       /62 (Site: Left Upper Arm, Position: Sitting, Cuff Size: Large Adult)   Pulse 66   Ht 1.626 m (5' 4.02\")   Wt 85.5 kg (188 lb 6.4 oz)   BMI 32.32 kg/m²   Physical Exam     Gen: NAD  HEENT: moist mucus membranes  Cardio: RRR  Pulm: chest rise symmetrically  GI: abd soft  Ext: no edema  Skin: warm    Neuro:    AOX3  CN 2-12 grossly intact  Speech articulate  Motor 5/5  Sensation symmetrical           Radiology Review:    XR cervical spine  11/11/2023  Official read:  1. Unremarkable appearance of cervical spine postop with no evidence of complication.  Mild thickening of the prevertebral soft tissues is expected.    XR cervical spine   01/02/2024  Official read:  Satisfactory appearance status post C3-4 anterior fusion and C4-5 and C5-6 arthroplasty.  Stable, anatomic alignment in flexion extension        ASSESSMENT AND PLAN:       Patient Active Problem List   Diagnosis    Chronic bilateral low back pain with left-sided sciatica    Constipation    Numbness and tingling in both hands    Chronic bilateral low back pain with bilateral sciatica    Gastroesophageal reflux disease without esophagitis    Mixed hyperlipidemia    Hypercholesteremia    Class 1 obesity due to excess calories without serious comorbidity

## 2024-01-30 NOTE — FLOWSHEET NOTE
[x] [facility]  Outpatient Rehabilitation &  Therapy  [address]  [phone]  F: [phone] [] [facility]  Outpatient Rehabilitation &  Therapy  [address]   [address]  P: [phone]  F: [phone] [] [facility]  Outpatient Rehabilitation &  Therapy  [address]  P: [phone]  F: [phone] [] [facility]  Outpatient Rehabilitation &  Therapy  [address]  P: [phone]  F: [phone] [] [facility]  Outpatient Rehabilitation &  Therapy  [address]   P: [phone]  F: [phone]  [] [facility]  Outpatient Rehabilitation &  Therapy  [address]   P: [phone]  F: [phone] [] [facility]  Outpatient Rehabilitation &  Therapy  [address]  P: [phone]  F: [phone] [] [facility]  Outpatient Rehabilitation &  Therapy  [address]  P: [phone]  F: [phone] [] [facility]  Outpatient Rehabilitation &  Therapy  [address]  P: [phone]  F: [phone]      Physical Therapy Daily Treatment Note    Date:  2024  Patient Name:  Nena Casanova    :  1965  MRN: [id number]   Physician: Bekah WHALEN-CNP                      Insurance: [facility] Medicare/ [place] Medicaid (BMN)  Medical Diagnosis:   M48.02, G99.2 (ICD-10-CM) - Stenosis of cervical spine with myelopathy (HCC)   M48.062 (ICD-10-CM) - Spinal stenosis of lumbar region with neurogenic claudication   Z98.1 (ICD-10-CM) - S/P cervical spinal fusion   Z98.890 (ICD-10-CM) - S/P laminectomy   Rehab Codes: pain M25.552, M54.59, posture R29.3, weakness M62.552, myofascial M79.1, M62.830  Onset Date: 11-10-23                        Next 's appt.: 24  Visit# / total visits: ; Progress note for Medicare patient due at visit 10     Cancels/No

## 2024-02-01 ENCOUNTER — HOSPITAL ENCOUNTER (OUTPATIENT)
Dept: PHYSICAL THERAPY | Age: 59
Setting detail: THERAPIES SERIES
Discharge: HOME | End: 2024-02-01

## 2024-02-01 NOTE — DISCHARGE SUMMARY
[x] [facility]  Outpatient Rehabilitation &  Therapy  [address]  [phone]  [phone] [] [facility]  Outpatient Rehabilitation &  Therapy  [address]  P: [phone]  F: [phone] [] [facility]  Outpatient Rehabilitation &  Therapy  [address]  P: [phone]  F: [phone] [] [facility]  Outpatient Rehabilitation &  Therapy  [address]  [phone]  [phone] [] [facility]  Outpatient Rehabilitation &  Therapy  [address]   P: [phone]  F: [phone]  [] [facility]  Outpatient Rehabilitation &  Therapy  [address]  P: [phone]  F: [phone] [] [facility]  Outpatient Rehabilitation &  Therapy  [address]  P: [phone]  F: [phone] [] [facility]  Outpatient Rehabilitation &  Therapy  [address]  P: [phone]  F: [phone] [] [facility]  Outpatient Rehabilitation &  Therapy  [address]  P: [phone]  F: [phone]  [] [facility] Outpatient Rehabilitation &  Therapy  [address]  P: [phone]  F: [phone]     Physical Therapy Discharge Note    Date: 2024      Patient: Nena Casanova  : 1965  MRN: [id number]     Physician: Bekah WHALEN-CNP                      Insurance: [employer] Medicare/ [place] Medicaid (BMN)  Medical Diagnosis:   M48.02, G99.2 (ICD-10-CM) - Stenosis of cervical spine with myelopathy (HCC)   M48.062 (ICD-10-CM) - Spinal stenosis of lumbar region with neurogenic claudication   Z98.1 (ICD-10-CM) - S/P cervical spinal fusion   Z98.890 (ICD-10-CM) - S/P laminectomy   Rehab Codes: pain M25.552, M54.59, posture R29.3, weakness M62.552, myofascial M79.1, M62.830  Onset Date: 11-10-23                        Next 's appt.:

## 2024-02-09 ENCOUNTER — TELEPHONE (OUTPATIENT)
Dept: NEUROSURGERY | Age: 59
End: 2024-02-09

## 2024-02-09 NOTE — TELEPHONE ENCOUNTER
Typically for fusions it is recommended to wear for 6-9 months. The patient had a combination of cervical fusion and disc replacement. Will verify with Dr Aguilera. He did give her permission to wean her neck collar at her last appointment.    Dr Aguilera-should she continue stimulator?

## 2024-02-09 NOTE — TELEPHONE ENCOUNTER
Patient states at her last visit she was told she did not need to wear a stimulator, she has received several calls from someone name Yariel telling her that he spoke with you ( Dr. Aguilera) and it was advised that she wear it.  Patient is confused. Does she need to wear it or not?  Please advise.

## 2024-02-13 ENCOUNTER — TELEPHONE (OUTPATIENT)
Dept: NEUROSURGERY | Age: 59
End: 2024-02-13

## 2024-02-13 NOTE — TELEPHONE ENCOUNTER
I called the patient and apologized for the confusion, patient is advised to discontinue the collar and continue to wear the stimulator. Patient verbalized understanding.

## 2024-03-14 SDOH — HEALTH STABILITY: PHYSICAL HEALTH: ON AVERAGE, HOW MANY DAYS PER WEEK DO YOU ENGAGE IN MODERATE TO STRENUOUS EXERCISE (LIKE A BRISK WALK)?: 7 DAYS

## 2024-03-14 SDOH — HEALTH STABILITY: PHYSICAL HEALTH: ON AVERAGE, HOW MANY MINUTES DO YOU ENGAGE IN EXERCISE AT THIS LEVEL?: 60 MIN

## 2024-03-15 ENCOUNTER — HOSPITAL ENCOUNTER (OUTPATIENT)
Dept: GENERAL RADIOLOGY | Age: 59
End: 2024-03-15
Payer: MEDICARE

## 2024-03-15 ENCOUNTER — OFFICE VISIT (OUTPATIENT)
Dept: PRIMARY CARE CLINIC | Age: 59
End: 2024-03-15
Payer: MEDICARE

## 2024-03-15 ENCOUNTER — HOSPITAL ENCOUNTER (OUTPATIENT)
Age: 59
Discharge: HOME | End: 2024-03-17

## 2024-03-15 ENCOUNTER — HOSPITAL ENCOUNTER (OUTPATIENT)
Age: 59
Discharge: HOME | End: 2024-03-15
Payer: MEDICARE

## 2024-03-15 VITALS
HEIGHT: 64 IN | SYSTOLIC BLOOD PRESSURE: 125 MMHG | DIASTOLIC BLOOD PRESSURE: 80 MMHG | OXYGEN SATURATION: 98 % | WEIGHT: 184.2 LBS | HEART RATE: 61 BPM | BODY MASS INDEX: 31.45 KG/M2

## 2024-03-15 DIAGNOSIS — K21.9 GASTROESOPHAGEAL REFLUX DISEASE WITHOUT ESOPHAGITIS: ICD-10-CM

## 2024-03-15 DIAGNOSIS — M48.02 STENOSIS OF CERVICAL SPINE WITH MYELOPATHY (HCC): ICD-10-CM

## 2024-03-15 DIAGNOSIS — Z98.1 S/P CERVICAL SPINAL FUSION: ICD-10-CM

## 2024-03-15 DIAGNOSIS — R63.5 WEIGHT GAIN: ICD-10-CM

## 2024-03-15 DIAGNOSIS — Z76.89 ENCOUNTER TO ESTABLISH CARE: Primary | ICD-10-CM

## 2024-03-15 DIAGNOSIS — E83.10 DISORDER OF IRON METABOLISM, UNSPECIFIED: ICD-10-CM

## 2024-03-15 DIAGNOSIS — E55.9 VITAMIN D INSUFFICIENCY: ICD-10-CM

## 2024-03-15 DIAGNOSIS — G99.2 STENOSIS OF CERVICAL SPINE WITH MYELOPATHY (HCC): ICD-10-CM

## 2024-03-15 DIAGNOSIS — R63.8 UNABLE TO LOSE WEIGHT: ICD-10-CM

## 2024-03-15 PROBLEM — E66.09 CLASS 1 OBESITY DUE TO EXCESS CALORIES WITHOUT SERIOUS COMORBIDITY WITH BODY MASS INDEX (BMI) OF 30.0 TO 30.9 IN ADULT: Status: RESOLVED | Noted: 2022-10-17 | Resolved: 2024-03-15

## 2024-03-15 PROBLEM — F39 MOOD DISORDER (HCC): Status: RESOLVED | Noted: 2023-01-17 | Resolved: 2024-03-15

## 2024-03-15 PROBLEM — E66.811 CLASS 1 OBESITY DUE TO EXCESS CALORIES WITHOUT SERIOUS COMORBIDITY WITH BODY MASS INDEX (BMI) OF 30.0 TO 30.9 IN ADULT: Status: RESOLVED | Noted: 2022-10-17 | Resolved: 2024-03-15

## 2024-03-15 PROBLEM — F32.9 MAJOR DEPRESSIVE DISORDER WITH SINGLE EPISODE: Status: RESOLVED | Noted: 2023-01-17 | Resolved: 2024-03-15

## 2024-03-15 LAB
25(OH)D3 SERPL-MCNC: 36.8 NG/ML (ref 30–100)
ALBUMIN SERPL-MCNC: 4.4 G/DL (ref 3.5–5.2)
ALBUMIN/GLOB SERPL: 2 {RATIO} (ref 1–2.5)
ALP SERPL-CCNC: 107 U/L (ref 35–104)
ALT SERPL-CCNC: 13 U/L (ref 10–35)
ANION GAP SERPL CALCULATED.3IONS-SCNC: 8 MMOL/L (ref 9–16)
AST SERPL-CCNC: 28 U/L (ref 10–35)
BASOPHILS # BLD: 0.04 K/UL (ref 0–0.2)
BASOPHILS NFR BLD: 1 % (ref 0–2)
BILIRUB SERPL-MCNC: 0.4 MG/DL (ref 0–1.2)
BUN SERPL-MCNC: 12 MG/DL (ref 6–20)
CALCIUM SERPL-MCNC: 9.6 MG/DL (ref 8.6–10.4)
CHLORIDE SERPL-SCNC: 104 MMOL/L (ref 98–107)
CO2 SERPL-SCNC: 28 MMOL/L (ref 20–31)
CREAT SERPL-MCNC: 0.8 MG/DL (ref 0.5–0.9)
EOSINOPHIL # BLD: 0.08 K/UL (ref 0–0.44)
EOSINOPHILS RELATIVE PERCENT: 1 % (ref 1–4)
ERYTHROCYTE [DISTWIDTH] IN BLOOD BY AUTOMATED COUNT: 14.4 % (ref 11.8–14.4)
EST. AVERAGE GLUCOSE BLD GHB EST-MCNC: 100 MG/DL
GFR SERPL CREATININE-BSD FRML MDRD: >60 ML/MIN/1.73M2
GLUCOSE SERPL-MCNC: 87 MG/DL (ref 74–99)
HBA1C MFR BLD: 5.1 % (ref 4–6)
HCT VFR BLD AUTO: 43.3 % (ref 36.3–47.1)
HGB BLD-MCNC: 13.3 G/DL (ref 11.9–15.1)
IMM GRANULOCYTES # BLD AUTO: <0.03 K/UL (ref 0–0.3)
IMM GRANULOCYTES NFR BLD: 0 %
LYMPHOCYTES NFR BLD: 2.75 K/UL (ref 1.1–3.7)
LYMPHOCYTES RELATIVE PERCENT: 38 % (ref 24–43)
MCH RBC QN AUTO: 26.8 PG (ref 25.2–33.5)
MCHC RBC AUTO-ENTMCNC: 30.7 G/DL (ref 28.4–34.8)
MCV RBC AUTO: 87.1 FL (ref 82.6–102.9)
MONOCYTES NFR BLD: 0.63 K/UL (ref 0.1–1.2)
MONOCYTES NFR BLD: 9 % (ref 3–12)
NEUTROPHILS NFR BLD: 51 % (ref 36–65)
NEUTS SEG NFR BLD: 3.65 K/UL (ref 1.5–8.1)
NRBC BLD-RTO: 0 PER 100 WBC
PLATELET # BLD AUTO: 221 K/UL (ref 138–453)
PMV BLD AUTO: 10.1 FL (ref 8.1–13.5)
POTASSIUM SERPL-SCNC: 4.3 MMOL/L (ref 3.7–5.3)
PROT SERPL-MCNC: 7.3 G/DL (ref 6.6–8.7)
RBC # BLD AUTO: 4.97 M/UL (ref 3.95–5.11)
SODIUM SERPL-SCNC: 140 MMOL/L (ref 136–145)
TSH SERPL DL<=0.05 MIU/L-ACNC: 1.86 UIU/ML (ref 0.27–4.2)
WBC OTHER # BLD: 7.2 K/UL (ref 3.5–11.3)

## 2024-03-15 PROCEDURE — 82306 VITAMIN D 25 HYDROXY: CPT

## 2024-03-15 PROCEDURE — 84443 ASSAY THYROID STIM HORMONE: CPT

## 2024-03-15 PROCEDURE — 99204 OFFICE O/P NEW MOD 45 MIN: CPT | Performed by: NURSE PRACTITIONER

## 2024-03-15 PROCEDURE — 72040 X-RAY EXAM NECK SPINE 2-3 VW: CPT

## 2024-03-15 PROCEDURE — 80053 COMPREHEN METABOLIC PANEL: CPT

## 2024-03-15 PROCEDURE — 36415 COLL VENOUS BLD VENIPUNCTURE: CPT

## 2024-03-15 PROCEDURE — 83036 HEMOGLOBIN GLYCOSYLATED A1C: CPT

## 2024-03-15 PROCEDURE — 85025 COMPLETE CBC W/AUTO DIFF WBC: CPT

## 2024-03-15 RX ORDER — OMEPRAZOLE 40 MG/1
40 CAPSULE, DELAYED RELEASE ORAL
Qty: 90 CAPSULE | Refills: 1 | Status: SHIPPED | OUTPATIENT
Start: 2024-03-15

## 2024-03-15 RX ORDER — SEMAGLUTIDE 0.68 MG/ML
0.25 INJECTION, SOLUTION SUBCUTANEOUS WEEKLY
Qty: 3 ML | Refills: 0 | Status: SHIPPED | OUTPATIENT
Start: 2024-03-15 | End: 2024-04-12

## 2024-03-15 SDOH — ECONOMIC STABILITY: FOOD INSECURITY: WITHIN THE PAST 12 MONTHS, YOU WORRIED THAT YOUR FOOD WOULD RUN OUT BEFORE YOU GOT MONEY TO BUY MORE.: NEVER TRUE

## 2024-03-15 SDOH — ECONOMIC STABILITY: FOOD INSECURITY: WITHIN THE PAST 12 MONTHS, THE FOOD YOU BOUGHT JUST DIDN'T LAST AND YOU DIDN'T HAVE MONEY TO GET MORE.: NEVER TRUE

## 2024-03-15 SDOH — ECONOMIC STABILITY: INCOME INSECURITY: HOW HARD IS IT FOR YOU TO PAY FOR THE VERY BASICS LIKE FOOD, HOUSING, MEDICAL CARE, AND HEATING?: NOT HARD AT ALL

## 2024-03-15 ASSESSMENT — PATIENT HEALTH QUESTIONNAIRE - PHQ9
SUM OF ALL RESPONSES TO PHQ9 QUESTIONS 1 & 2: 0
6. FEELING BAD ABOUT YOURSELF - OR THAT YOU ARE A FAILURE OR HAVE LET YOURSELF OR YOUR FAMILY DOWN: NOT AT ALL
SUM OF ALL RESPONSES TO PHQ QUESTIONS 1-9: 2
2. FEELING DOWN, DEPRESSED OR HOPELESS: NOT AT ALL
SUM OF ALL RESPONSES TO PHQ QUESTIONS 1-9: 2
SUM OF ALL RESPONSES TO PHQ QUESTIONS 1-9: 2
10. IF YOU CHECKED OFF ANY PROBLEMS, HOW DIFFICULT HAVE THESE PROBLEMS MADE IT FOR YOU TO DO YOUR WORK, TAKE CARE OF THINGS AT HOME, OR GET ALONG WITH OTHER PEOPLE: SOMEWHAT DIFFICULT
4. FEELING TIRED OR HAVING LITTLE ENERGY: NOT AT ALL
5. POOR APPETITE OR OVEREATING: SEVERAL DAYS
8. MOVING OR SPEAKING SO SLOWLY THAT OTHER PEOPLE COULD HAVE NOTICED. OR THE OPPOSITE, BEING SO FIGETY OR RESTLESS THAT YOU HAVE BEEN MOVING AROUND A LOT MORE THAN USUAL: NOT AT ALL
9. THOUGHTS THAT YOU WOULD BE BETTER OFF DEAD, OR OF HURTING YOURSELF: NOT AT ALL
SUM OF ALL RESPONSES TO PHQ QUESTIONS 1-9: 2
1. LITTLE INTEREST OR PLEASURE IN DOING THINGS: NOT AT ALL
3. TROUBLE FALLING OR STAYING ASLEEP: SEVERAL DAYS

## 2024-03-15 NOTE — PROGRESS NOTES
[facility]  [facility] PRIMARY CARE  [address] [place] [address]  [place] [address]  Dept: [phone]       Nena Casanova is a 58 y.o. female who presents today for her  medical conditions/complaintsas noted below.  Nena Casanova is c/o of New Patient, Establish Care, and Weight Loss      HPI:     HPI    This is a 58-year-old female with underlying history of osteoarthritis/chronic pain (s/p lumbar laminectomy, cervical fusion and hip replacement), GERD, gastric sleeve surgery (vitamin D insufficiency and iron deficiency anemia) who presents today to establish care.  Has had multiple hip and knee surgeries over the last several months due to her chronic osteoarthritis.  Her primary struggle today is inability to lose weight.  She has been slowly increasing exercise and is now biking and walking on a regular basis.  For several years, has been following a calorie restricted diet.  Has met with dietitian in the past without improvement.  States her diet is mostly protein focused and eats a lot of fruits and vegetables.  Drinking water and sugar-free drinks only.  Is cut back to 1 coffee daily.  Ultimately underwent gastric sleeve procedure.  Has tried orlistat recently and has followed weight watchers and Adipex without significant improvement.  She is particularly wanting to discuss GLP-1 medications for assistance with weight loss.  Extensive conversation had regarding potential insurance restrictions, lack of supply due to nationwide shortages along with risks associated with medication.  Patient verbalizes understanding.  Will attempt to send GLP-1 medications over to evaluate insurance coverage.  Plan of care to develop pending more information.    Cervical Spine Stenosis: Follows with neurosurgery.  Further care deferred accordingly.  Symptoms are stable    GERD: Stable but uncontrolled and ongoing for years.  Refill omeprazole at patient's request. Avoid triggering

## 2024-03-21 ASSESSMENT — ENCOUNTER SYMPTOMS
DIARRHEA: 0
PHOTOPHOBIA: 0
VOMITING: 0
NAUSEA: 0
SHORTNESS OF BREATH: 0
SINUS PRESSURE: 0
ABDOMINAL PAIN: 0
BACK PAIN: 0
COUGH: 0
CHEST TIGHTNESS: 0
COLOR CHANGE: 0
SORE THROAT: 0
SINUS PAIN: 0

## 2024-04-08 RX ORDER — SEMAGLUTIDE 0.68 MG/ML
0.25 INJECTION, SOLUTION SUBCUTANEOUS WEEKLY
Qty: 3 ML | Refills: 0 | Status: SHIPPED | OUTPATIENT
Start: 2024-04-08 | End: 2024-05-06

## 2024-04-16 ENCOUNTER — OFFICE VISIT (OUTPATIENT)
Dept: PRIMARY CARE CLINIC | Age: 59
End: 2024-04-16
Payer: MEDICARE

## 2024-04-16 VITALS
HEIGHT: 64 IN | WEIGHT: 177.6 LBS | DIASTOLIC BLOOD PRESSURE: 85 MMHG | BODY MASS INDEX: 30.32 KG/M2 | OXYGEN SATURATION: 98 % | HEART RATE: 79 BPM | SYSTOLIC BLOOD PRESSURE: 122 MMHG

## 2024-04-16 DIAGNOSIS — J06.9 VIRAL URI WITH COUGH: ICD-10-CM

## 2024-04-16 LAB
INFLUENZA A ANTIBODY: NORMAL
INFLUENZA B ANTIBODY: NORMAL

## 2024-04-16 PROCEDURE — 99213 OFFICE O/P EST LOW 20 MIN: CPT | Performed by: NURSE PRACTITIONER

## 2024-04-16 PROCEDURE — 87804 INFLUENZA ASSAY W/OPTIC: CPT | Performed by: NURSE PRACTITIONER

## 2024-04-16 ASSESSMENT — ENCOUNTER SYMPTOMS
COLOR CHANGE: 0
SINUS PAIN: 0
PHOTOPHOBIA: 0
COUGH: 1
VOMITING: 0
SHORTNESS OF BREATH: 1
NAUSEA: 0
BACK PAIN: 0
SINUS PRESSURE: 0
DIARRHEA: 0
CHEST TIGHTNESS: 0
ABDOMINAL PAIN: 0
SORE THROAT: 0

## 2024-04-16 NOTE — PROGRESS NOTES
Mood and Affect: Mood normal.         Behavior: Behavior normal.         Thought Content: Thought content normal.         Judgment: Judgment normal.                  An electronic signature was used to authenticate this note.    --KOBY Campbell - CNP

## 2024-04-18 ENCOUNTER — PATIENT MESSAGE (OUTPATIENT)
Dept: PRIMARY CARE CLINIC | Age: 59
End: 2024-04-18

## 2024-05-03 ENCOUNTER — HOSPITAL ENCOUNTER (OUTPATIENT)
Dept: GENERAL RADIOLOGY | Age: 59
End: 2024-05-03
Payer: MEDICARE

## 2024-05-03 ENCOUNTER — OFFICE VISIT (OUTPATIENT)
Dept: NEUROSURGERY | Age: 59
End: 2024-05-03
Payer: MEDICARE

## 2024-05-03 ENCOUNTER — HOSPITAL ENCOUNTER (OUTPATIENT)
Age: 59
End: 2024-05-03
Payer: MEDICARE

## 2024-05-03 VITALS
DIASTOLIC BLOOD PRESSURE: 73 MMHG | BODY MASS INDEX: 30.05 KG/M2 | HEIGHT: 64 IN | HEART RATE: 74 BPM | SYSTOLIC BLOOD PRESSURE: 111 MMHG | WEIGHT: 176 LBS

## 2024-05-03 DIAGNOSIS — Z98.1 HISTORY OF FUSION OF CERVICAL SPINE: ICD-10-CM

## 2024-05-03 DIAGNOSIS — M47.816 LUMBAR SPONDYLOSIS: Primary | ICD-10-CM

## 2024-05-03 DIAGNOSIS — M47.816 LUMBAR SPONDYLOSIS: ICD-10-CM

## 2024-05-03 DIAGNOSIS — G99.2 STENOSIS OF CERVICAL SPINE WITH MYELOPATHY (HCC): ICD-10-CM

## 2024-05-03 DIAGNOSIS — M48.02 STENOSIS OF CERVICAL SPINE WITH MYELOPATHY (HCC): ICD-10-CM

## 2024-05-03 DIAGNOSIS — Z98.890 HISTORY OF LUMBAR LAMINECTOMY: ICD-10-CM

## 2024-05-03 PROCEDURE — 99214 OFFICE O/P EST MOD 30 MIN: CPT | Performed by: NURSE PRACTITIONER

## 2024-05-03 PROCEDURE — 72110 X-RAY EXAM L-2 SPINE 4/>VWS: CPT

## 2024-05-03 RX ORDER — MELOXICAM 7.5 MG/1
7.5 TABLET ORAL DAILY
Qty: 30 TABLET | Refills: 0 | Status: SHIPPED | OUTPATIENT
Start: 2024-05-03 | End: 2024-06-02

## 2024-05-03 NOTE — PROGRESS NOTES
[facility]  [address]  [address]  [address]  [facility] [address]  Dept: [phone]    Patient:  Nena Casanova  YOB: 1965  Date: 5/3/24    The patient is a 59 y.o. female who presents today for consult of the following problems:     Chief Complaint   Patient presents with    Follow-up         HPI:     Nena Casanova is a 59 y.o. female who presents for follow up of L3-L5 lumbar laminectomy, as well as cervical fusion and disc replacement completed in November and December.  Overall patient does feel as though she is doing much better than before surgery.  Symptoms have overall significantly improved.  Has been able to increase her physical activity, activity tolerance.  Did recently start to have issues with sharp lower back pain when she first wakes up in the morning.  Reports that it has been severe enough at times that she had difficulty even getting to the bathroom.  Reports that once she gets up and is able to start moving around symptoms do subside and she is able to do the things that she would like to do.  Denies any new numbness, tingling or weakness.  No new saddle anesthesia changes to bowels or bladder.    History:     Past Medical History:   Diagnosis Date    Arthritis     Back pain     Constipation     COVID-19 vaccine dose not administered     Heartburn     History of anesthesia reaction     vomiting after given drug with hip surgery-unknown name of drug-surg 11/1/2022    Hyperlipidemia     Irritable bowel syndrome As a kid    Migraine     Spinal stenosis     Under care of service provider 10/27/2023    pcp-eleni strickland-due to visit    Wears dentures     Wears glasses      Past Surgical History:   Procedure Laterality Date    BLADDER SUSPENSION      x3    CARPAL TUNNEL RELEASE Bilateral     CERVICAL FUSION N/A 11/10/2023    ANTERIOR CERVICAL DISCECTOMY, FUSION C3-4,

## 2024-05-07 ENCOUNTER — ENROLLMENT (OUTPATIENT)
Dept: PHARMACY | Facility: CLINIC | Age: 59
End: 2024-05-07

## 2024-05-23 RX ORDER — SEMAGLUTIDE 1.34 MG/ML
1 INJECTION, SOLUTION SUBCUTANEOUS
Qty: 3 ML | Refills: 1 | Status: SHIPPED | OUTPATIENT
Start: 2024-05-23 | End: 2024-07-18

## 2024-05-30 DIAGNOSIS — M47.816 LUMBAR SPONDYLOSIS: ICD-10-CM

## 2024-05-30 RX ORDER — MELOXICAM 7.5 MG/1
7.5 TABLET ORAL DAILY
Qty: 30 TABLET | Refills: 0 | Status: SHIPPED | OUTPATIENT
Start: 2024-05-30 | End: 2024-07-08

## 2024-05-30 NOTE — TELEPHONE ENCOUNTER
E-scribe requesting refill for Meloxicam. Please review and e-scribe if applicable.     Last Visit Date: 05/03/2024    Next Visit Date: 07/26/2024  
No indicators present

## 2024-06-21 ENCOUNTER — PATIENT MESSAGE (OUTPATIENT)
Dept: PRIMARY CARE CLINIC | Age: 59
End: 2024-06-21

## 2024-06-21 RX ORDER — SEMAGLUTIDE 2.68 MG/ML
2 INJECTION, SOLUTION SUBCUTANEOUS
Qty: 3 ML | Refills: 0 | Status: SHIPPED | OUTPATIENT
Start: 2024-06-21 | End: 2024-07-19

## 2024-07-07 DIAGNOSIS — M47.816 LUMBAR SPONDYLOSIS: ICD-10-CM

## 2024-07-08 RX ORDER — MELOXICAM 7.5 MG/1
7.5 TABLET ORAL DAILY
Qty: 30 TABLET | Refills: 0 | Status: SHIPPED | OUTPATIENT
Start: 2024-07-08

## 2024-07-12 RX ORDER — SEMAGLUTIDE 2.68 MG/ML
2 INJECTION, SOLUTION SUBCUTANEOUS
Qty: 3 ML | Refills: 0 | Status: SHIPPED | OUTPATIENT
Start: 2024-07-12 | End: 2024-08-09

## 2024-07-12 SDOH — HEALTH STABILITY: PHYSICAL HEALTH: ON AVERAGE, HOW MANY DAYS PER WEEK DO YOU ENGAGE IN MODERATE TO STRENUOUS EXERCISE (LIKE A BRISK WALK)?: 7 DAYS

## 2024-07-12 SDOH — HEALTH STABILITY: PHYSICAL HEALTH: ON AVERAGE, HOW MANY MINUTES DO YOU ENGAGE IN EXERCISE AT THIS LEVEL?: 30 MIN

## 2024-07-12 ASSESSMENT — PATIENT HEALTH QUESTIONNAIRE - PHQ9
SUM OF ALL RESPONSES TO PHQ QUESTIONS 1-9: 0
2. FEELING DOWN, DEPRESSED OR HOPELESS: NOT AT ALL
SUM OF ALL RESPONSES TO PHQ9 QUESTIONS 1 & 2: 0
SUM OF ALL RESPONSES TO PHQ QUESTIONS 1-9: 0
1. LITTLE INTEREST OR PLEASURE IN DOING THINGS: NOT AT ALL

## 2024-07-12 ASSESSMENT — LIFESTYLE VARIABLES
HOW OFTEN DO YOU HAVE A DRINK CONTAINING ALCOHOL: NEVER
HOW OFTEN DO YOU HAVE A DRINK CONTAINING ALCOHOL: 1
HOW MANY STANDARD DRINKS CONTAINING ALCOHOL DO YOU HAVE ON A TYPICAL DAY: 0
HOW OFTEN DO YOU HAVE SIX OR MORE DRINKS ON ONE OCCASION: 1
HOW MANY STANDARD DRINKS CONTAINING ALCOHOL DO YOU HAVE ON A TYPICAL DAY: PATIENT DOES NOT DRINK

## 2024-07-15 ENCOUNTER — OFFICE VISIT (OUTPATIENT)
Dept: PRIMARY CARE CLINIC | Age: 59
End: 2024-07-15
Payer: MEDICARE

## 2024-07-15 VITALS
OXYGEN SATURATION: 95 % | DIASTOLIC BLOOD PRESSURE: 72 MMHG | HEIGHT: 64 IN | SYSTOLIC BLOOD PRESSURE: 101 MMHG | HEART RATE: 81 BPM | BODY MASS INDEX: 29.53 KG/M2 | WEIGHT: 173 LBS

## 2024-07-15 DIAGNOSIS — K21.9 GASTROESOPHAGEAL REFLUX DISEASE WITHOUT ESOPHAGITIS: ICD-10-CM

## 2024-07-15 DIAGNOSIS — Z00.00 INITIAL MEDICARE ANNUAL WELLNESS VISIT: Primary | ICD-10-CM

## 2024-07-15 DIAGNOSIS — K59.01 SLOW TRANSIT CONSTIPATION: ICD-10-CM

## 2024-07-15 DIAGNOSIS — I70.0 CALCIFICATION OF ABDOMINAL AORTA (HCC): ICD-10-CM

## 2024-07-15 PROCEDURE — G0438 PPPS, INITIAL VISIT: HCPCS | Performed by: NURSE PRACTITIONER

## 2024-07-15 RX ORDER — OMEPRAZOLE 40 MG/1
40 CAPSULE, DELAYED RELEASE ORAL
Qty: 90 CAPSULE | Refills: 1 | Status: SHIPPED | OUTPATIENT
Start: 2024-07-15

## 2024-07-15 RX ORDER — ROSUVASTATIN CALCIUM 10 MG/1
10 TABLET, COATED ORAL NIGHTLY
Qty: 30 TABLET | Refills: 3 | Status: SHIPPED | OUTPATIENT
Start: 2024-07-15

## 2024-07-15 ASSESSMENT — PATIENT HEALTH QUESTIONNAIRE - PHQ9
SUM OF ALL RESPONSES TO PHQ QUESTIONS 1-9: 0
8. MOVING OR SPEAKING SO SLOWLY THAT OTHER PEOPLE COULD HAVE NOTICED. OR THE OPPOSITE, BEING SO FIGETY OR RESTLESS THAT YOU HAVE BEEN MOVING AROUND A LOT MORE THAN USUAL: NOT AT ALL
4. FEELING TIRED OR HAVING LITTLE ENERGY: NOT AT ALL
2. FEELING DOWN, DEPRESSED OR HOPELESS: NOT AT ALL
SUM OF ALL RESPONSES TO PHQ QUESTIONS 1-9: 0
9. THOUGHTS THAT YOU WOULD BE BETTER OFF DEAD, OR OF HURTING YOURSELF: NOT AT ALL
6. FEELING BAD ABOUT YOURSELF - OR THAT YOU ARE A FAILURE OR HAVE LET YOURSELF OR YOUR FAMILY DOWN: NOT AT ALL
SUM OF ALL RESPONSES TO PHQ QUESTIONS 1-9: 0
7. TROUBLE CONCENTRATING ON THINGS, SUCH AS READING THE NEWSPAPER OR WATCHING TELEVISION: NOT AT ALL
SUM OF ALL RESPONSES TO PHQ9 QUESTIONS 1 & 2: 0
5. POOR APPETITE OR OVEREATING: NOT AT ALL
1. LITTLE INTEREST OR PLEASURE IN DOING THINGS: NOT AT ALL
3. TROUBLE FALLING OR STAYING ASLEEP: NOT AT ALL
SUM OF ALL RESPONSES TO PHQ QUESTIONS 1-9: 0
10. IF YOU CHECKED OFF ANY PROBLEMS, HOW DIFFICULT HAVE THESE PROBLEMS MADE IT FOR YOU TO DO YOUR WORK, TAKE CARE OF THINGS AT HOME, OR GET ALONG WITH OTHER PEOPLE: NOT DIFFICULT AT ALL

## 2024-07-15 NOTE — PROGRESS NOTES
screening values have been reviewed and are found in Flowsheets. The following problems were reviewed today and where indicated follow up appointments were made and/or referrals ordered.    Positive Risk Factor Screenings with Interventions:                   Dentist Screen:  Have you seen the dentist within the past year?: (!) No    Intervention:  Not applicable - dentures        Advanced Directives:  Do you have a Living Will?: (!) No    Intervention:  Adult children-- preparing to divorce from current SO.  Adult children are her legal next of kin.  Have had discussions.                      Objective   Vitals:    07/15/24 0844   BP: 101/72   Site: Left Upper Arm   Position: Sitting   Cuff Size: Large Adult   Pulse: 81   SpO2: 95%   Weight: 78.5 kg (173 lb)   Height: 1.626 m (5' 4.02\")      Body mass index is 29.68 kg/m².      General Appearance: alert and oriented to person, place and time, well developed and well- nourished, in no acute distress  Skin: warm and dry, no rash or erythema  Head: normocephalic and atraumatic  Eyes: pupils equal, round, and reactive to light, extraocular eye movements intact, conjunctivae normal  ENT: tympanic membrane, external ear and ear canal normal bilaterally, nose without deformity, nasal mucosa and turbinates normal without polyps  Neck: supple and non-tender without mass, no thyromegaly or thyroid nodules, no cervical lymphadenopathy  Pulmonary/Chest: clear to auscultation bilaterally- no wheezes, rales or rhonchi, normal air movement, no respiratory distress  Cardiovascular: normal rate, regular rhythm, normal S1 and S2, no murmurs, rubs, clicks, or gallops, distal pulses intact, no carotid bruits  Abdomen: soft, non-tender, non-distended, normal bowel sounds, no masses or organomegaly  Extremities: no cyanosis, clubbing or edema  Musculoskeletal: normal range of motion, no joint swelling, deformity or tenderness  Neurologic: reflexes normal and symmetric, no cranial nerve

## 2024-07-15 NOTE — PATIENT INSTRUCTIONS
ask your healthcare professional. [employer] disclaims any warranty or liability for your use of this information.      Personalized Preventive Plan for Nena Casanova - 7/15/2024  Medicare offers a range of preventive health benefits. Some of the tests and screenings are paid in full while other may be subject to a deductible, co-insurance, and/or copay.    Some of these benefits include a comprehensive review of your medical history including lifestyle, illnesses that may run in your family, and various assessments and screenings as appropriate.    After reviewing your medical record and screening and assessments performed today your provider may have ordered immunizations, labs, imaging, and/or referrals for you.  A list of these orders (if applicable) as well as your Preventive Care list are included within your After Visit Summary for your review.    Other Preventive Recommendations:    A preventive eye exam performed by an eye specialist is recommended every 1-2 years to screen for glaucoma; cataracts, macular degeneration, and other eye disorders.  A preventive dental visit is recommended every 6 months.  Try to get at least 150 minutes of exercise per week or 10,000 steps per day on a pedometer .  Order or download the FREE \"Exercise & Physical Activity: Your Everyday Guide\" from [employer]. Call 1-627.121.8467 or search [employer] online.  You need 2475-0604 mg of calcium and 9907-7172 IU of vitamin D per day. It is possible to meet your calcium requirement with diet alone, but a vitamin D supplement is usually necessary to meet this goal.  When exposed to the sun, use a sunscreen that protects against both UVA and UVB radiation with an SPF of 30 or greater. Reapply every 2 to 3 hours or after sweating, drying off with a towel, or swimming.  Always wear a seat belt when traveling in a car. Always wear a helmet when riding a bicycle or motorcycle.

## 2024-08-02 RX ORDER — SEMAGLUTIDE 2.68 MG/ML
2 INJECTION, SOLUTION SUBCUTANEOUS
Qty: 3 ML | Refills: 1 | Status: SHIPPED | OUTPATIENT
Start: 2024-08-02 | End: 2024-09-27

## 2024-08-11 ENCOUNTER — PATIENT MESSAGE (OUTPATIENT)
Dept: PRIMARY CARE CLINIC | Age: 59
End: 2024-08-11

## 2024-08-11 DIAGNOSIS — K59.04 CHRONIC IDIOPATHIC CONSTIPATION: Primary | ICD-10-CM

## 2024-09-10 DIAGNOSIS — K59.04 CHRONIC IDIOPATHIC CONSTIPATION: ICD-10-CM

## 2024-10-14 ENCOUNTER — PATIENT MESSAGE (OUTPATIENT)
Dept: PRIMARY CARE CLINIC | Age: 59
End: 2024-10-14

## 2024-10-14 DIAGNOSIS — K59.01 SLOW TRANSIT CONSTIPATION: Primary | ICD-10-CM

## 2024-11-16 DIAGNOSIS — I70.0 CALCIFICATION OF ABDOMINAL AORTA (HCC): ICD-10-CM

## 2024-11-18 RX ORDER — ROSUVASTATIN CALCIUM 10 MG/1
10 TABLET, COATED ORAL NIGHTLY
Qty: 90 TABLET | Refills: 1 | Status: SHIPPED | OUTPATIENT
Start: 2024-11-18

## 2024-12-13 ENCOUNTER — TELEPHONE (OUTPATIENT)
Dept: PHARMACY | Facility: CLINIC | Age: 59
End: 2024-12-13

## 2024-12-13 NOTE — TELEPHONE ENCOUNTER
Patient/Caregiver: 1 via Telephone and Pharmacy: 1  Intervention Detail: Adherence Monitorin  Intervention Accepted By: Patient/Caregiver: 1 and Pharmacy: 1  Gap Closed?: Yes   Time Spent (min): 20

## 2024-12-26 RX ORDER — SEMAGLUTIDE 2.68 MG/ML
2 INJECTION, SOLUTION SUBCUTANEOUS
Qty: 3 ML | Refills: 1 | Status: SHIPPED | OUTPATIENT
Start: 2024-12-26 | End: 2025-02-20

## 2025-01-16 DIAGNOSIS — K59.01 SLOW TRANSIT CONSTIPATION: ICD-10-CM

## 2025-01-16 RX ORDER — LINACLOTIDE 145 UG/1
145 CAPSULE, GELATIN COATED ORAL
Qty: 30 CAPSULE | Refills: 2 | Status: SHIPPED | OUTPATIENT
Start: 2025-01-16

## 2025-01-23 ENCOUNTER — OFFICE VISIT (OUTPATIENT)
Dept: PRIMARY CARE CLINIC | Age: 60
End: 2025-01-23
Payer: MEDICARE

## 2025-01-23 VITALS
HEART RATE: 64 BPM | HEIGHT: 64 IN | BODY MASS INDEX: 30.25 KG/M2 | OXYGEN SATURATION: 98 % | WEIGHT: 177.2 LBS | SYSTOLIC BLOOD PRESSURE: 114 MMHG | DIASTOLIC BLOOD PRESSURE: 72 MMHG

## 2025-01-23 DIAGNOSIS — D64.9 ANEMIA, UNSPECIFIED TYPE: ICD-10-CM

## 2025-01-23 DIAGNOSIS — E55.9 VITAMIN D DEFICIENCY: ICD-10-CM

## 2025-01-23 DIAGNOSIS — G99.2 STENOSIS OF CERVICAL SPINE WITH MYELOPATHY (HCC): Primary | ICD-10-CM

## 2025-01-23 DIAGNOSIS — K21.9 GASTROESOPHAGEAL REFLUX DISEASE WITHOUT ESOPHAGITIS: ICD-10-CM

## 2025-01-23 DIAGNOSIS — Z12.31 ENCOUNTER FOR SCREENING MAMMOGRAM FOR MALIGNANT NEOPLASM OF BREAST: ICD-10-CM

## 2025-01-23 DIAGNOSIS — M48.02 STENOSIS OF CERVICAL SPINE WITH MYELOPATHY (HCC): Primary | ICD-10-CM

## 2025-01-23 PROCEDURE — 99212 OFFICE O/P EST SF 10 MIN: CPT | Performed by: NURSE PRACTITIONER

## 2025-01-23 SDOH — ECONOMIC STABILITY: FOOD INSECURITY: WITHIN THE PAST 12 MONTHS, THE FOOD YOU BOUGHT JUST DIDN'T LAST AND YOU DIDN'T HAVE MONEY TO GET MORE.: NEVER TRUE

## 2025-01-23 SDOH — ECONOMIC STABILITY: FOOD INSECURITY: WITHIN THE PAST 12 MONTHS, YOU WORRIED THAT YOUR FOOD WOULD RUN OUT BEFORE YOU GOT MONEY TO BUY MORE.: NEVER TRUE

## 2025-01-23 SDOH — ECONOMIC STABILITY: INCOME INSECURITY: IN THE LAST 12 MONTHS, WAS THERE A TIME WHEN YOU WERE NOT ABLE TO PAY THE MORTGAGE OR RENT ON TIME?: NO

## 2025-01-23 SDOH — ECONOMIC STABILITY: TRANSPORTATION INSECURITY
IN THE PAST 12 MONTHS, HAS LACK OF TRANSPORTATION KEPT YOU FROM MEETINGS, WORK, OR FROM GETTING THINGS NEEDED FOR DAILY LIVING?: NO

## 2025-01-23 SDOH — ECONOMIC STABILITY: TRANSPORTATION INSECURITY
IN THE PAST 12 MONTHS, HAS THE LACK OF TRANSPORTATION KEPT YOU FROM MEDICAL APPOINTMENTS OR FROM GETTING MEDICATIONS?: NO

## 2025-01-23 ASSESSMENT — PATIENT HEALTH QUESTIONNAIRE - PHQ9
7. TROUBLE CONCENTRATING ON THINGS, SUCH AS READING THE NEWSPAPER OR WATCHING TELEVISION: NOT AT ALL
9. THOUGHTS THAT YOU WOULD BE BETTER OFF DEAD, OR OF HURTING YOURSELF: NOT AT ALL
1. LITTLE INTEREST OR PLEASURE IN DOING THINGS: NOT AT ALL
5. POOR APPETITE OR OVEREATING: NOT AT ALL
9. THOUGHTS THAT YOU WOULD BE BETTER OFF DEAD, OR OF HURTING YOURSELF: NOT AT ALL
SUM OF ALL RESPONSES TO PHQ QUESTIONS 1-9: 0
SUM OF ALL RESPONSES TO PHQ QUESTIONS 1-9: 0
6. FEELING BAD ABOUT YOURSELF - OR THAT YOU ARE A FAILURE OR HAVE LET YOURSELF OR YOUR FAMILY DOWN: NOT AT ALL
SUM OF ALL RESPONSES TO PHQ9 QUESTIONS 1 & 2: 0
2. FEELING DOWN, DEPRESSED OR HOPELESS: NOT AT ALL
4. FEELING TIRED OR HAVING LITTLE ENERGY: NOT AT ALL
3. TROUBLE FALLING OR STAYING ASLEEP: NOT AT ALL
SUM OF ALL RESPONSES TO PHQ QUESTIONS 1-9: 0
4. FEELING TIRED OR HAVING LITTLE ENERGY: NOT AT ALL
10. IF YOU CHECKED OFF ANY PROBLEMS, HOW DIFFICULT HAVE THESE PROBLEMS MADE IT FOR YOU TO DO YOUR WORK, TAKE CARE OF THINGS AT HOME, OR GET ALONG WITH OTHER PEOPLE: NOT DIFFICULT AT ALL
6. FEELING BAD ABOUT YOURSELF - OR THAT YOU ARE A FAILURE OR HAVE LET YOURSELF OR YOUR FAMILY DOWN: NOT AT ALL
1. LITTLE INTEREST OR PLEASURE IN DOING THINGS: NOT AT ALL
3. TROUBLE FALLING OR STAYING ASLEEP: NOT AT ALL
SUM OF ALL RESPONSES TO PHQ QUESTIONS 1-9: 0
SUM OF ALL RESPONSES TO PHQ QUESTIONS 1-9: 0
10. IF YOU CHECKED OFF ANY PROBLEMS, HOW DIFFICULT HAVE THESE PROBLEMS MADE IT FOR YOU TO DO YOUR WORK, TAKE CARE OF THINGS AT HOME, OR GET ALONG WITH OTHER PEOPLE: NOT DIFFICULT AT ALL
7. TROUBLE CONCENTRATING ON THINGS, SUCH AS READING THE NEWSPAPER OR WATCHING TELEVISION: NOT AT ALL
8. MOVING OR SPEAKING SO SLOWLY THAT OTHER PEOPLE COULD HAVE NOTICED. OR THE OPPOSITE - BEING SO FIDGETY OR RESTLESS THAT YOU HAVE BEEN MOVING AROUND A LOT MORE THAN USUAL: NOT AT ALL
5. POOR APPETITE OR OVEREATING: NOT AT ALL
8. MOVING OR SPEAKING SO SLOWLY THAT OTHER PEOPLE COULD HAVE NOTICED. OR THE OPPOSITE, BEING SO FIGETY OR RESTLESS THAT YOU HAVE BEEN MOVING AROUND A LOT MORE THAN USUAL: NOT AT ALL
2. FEELING DOWN, DEPRESSED OR HOPELESS: NOT AT ALL

## 2025-01-23 NOTE — PROGRESS NOTES
[address] [facility] [address] [facility] [address]   1/30/2025    Nena Casanova is a 59 y.o. female who presents today for her medical conditions and/or complaints as noted below.    Nena Casanova is scheduled today for Follow-up (6mo )  .      HPI:     History of Present Illness  The patient is a 59-year-old female here today for a routine follow-up.    She reports an overall improvement in her health status, with the exception of weight gain. Over the past 3 weeks, her weight has increased from 162 to 177 pounds. She attributes this to muscle development from increased physical activity, including cycling for an hour and gym workouts. She has discontinued GLP-I due to its exacerbation of constipation. She applies a topical lotion to her feet, which she finds beneficial in alleviating fatigue and pain upon standing and walking.    She continues to experience reflux symptoms, despite an increase in omeprazole dosage to 40 mg. She has had episodes of regurgitation, particularly during sleep, leading to sensations of choking. She identifies late-night eating as a potential trigger for her reflux symptoms. She has no history of heartburn prior to her surgery and believes her current symptoms may be related to that procedure. She has not tried any other medications for her reflux.    She has been informed of snoring but has no history of sleep apnea. Her sleep quality has improved since returning to work, although she does wake up to urinate at night.    She is uncertain about the date of her last mammogram but believes it may have been in 2022.    MEDICATIONS  Discontinued: Celecoxib  Current: Linzess, omeprazole  Cervical Spine Stenosis: Follows with neurosurgery.  Further care deferred accordingly.  Symptoms are stable     GERD: Omeprazole increased to 40mg QD and symptoms have resolved completely. Holding off on GI referral at this time.        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

## 2025-01-30 ASSESSMENT — ENCOUNTER SYMPTOMS
PHOTOPHOBIA: 0
SHORTNESS OF BREATH: 0
SINUS PAIN: 0
ABDOMINAL PAIN: 0
CHEST TIGHTNESS: 0
DIARRHEA: 0
BACK PAIN: 0
COLOR CHANGE: 0
SINUS PRESSURE: 0
COUGH: 0
SORE THROAT: 0
VOMITING: 0
NAUSEA: 0

## 2025-03-14 DIAGNOSIS — K21.9 GASTROESOPHAGEAL REFLUX DISEASE WITHOUT ESOPHAGITIS: ICD-10-CM

## 2025-03-14 RX ORDER — OMEPRAZOLE 40 MG/1
40 CAPSULE, DELAYED RELEASE ORAL
Qty: 90 CAPSULE | Refills: 1 | Status: SHIPPED | OUTPATIENT
Start: 2025-03-14

## 2025-04-17 DIAGNOSIS — K59.01 SLOW TRANSIT CONSTIPATION: ICD-10-CM

## 2025-04-18 RX ORDER — LINACLOTIDE 145 UG/1
145 CAPSULE, GELATIN COATED ORAL
Qty: 30 CAPSULE | Refills: 5 | Status: SHIPPED | OUTPATIENT
Start: 2025-04-18

## (undated) DEVICE — TOWEL,OR,DSP,ST,BLUE,DLX,XR,4/PK,20PK/CS: Brand: MEDLINE

## (undated) DEVICE — SPONGE LAP W18XL18IN WHT COT 4 PLY FLD STRUNG RADPQ DISP ST 2 PER PACK

## (undated) DEVICE — GAUZE,SPONGE,FLUFF,6"X6.75",STRL,5/TRAY: Brand: MEDLINE

## (undated) DEVICE — CODMAN® SURGICAL PATTIES 1/2" X1 1/2" (1.27CM X 3.81CM): Brand: CODMAN®

## (undated) DEVICE — MARKER,SKIN,WI/RULER AND LABELS: Brand: MEDLINE

## (undated) DEVICE — SUTURE STRATAFIX SPRL SZ 2-0 L9IN ABSRB VLT MH L36MM 1/2 SXPD2B408

## (undated) DEVICE — TUBING, SUCTION, 9/32" X 20', STRAIGHT: Brand: MEDLINE INDUSTRIES, INC.

## (undated) DEVICE — SURGICAL PROCEDURE KIT BASIN MAJ SET UP

## (undated) DEVICE — C-ARMOR C-ARM EQUIPMENT COVERS CLEAR STERILE UNIVERSAL FIT 12 PER CASE: Brand: C-ARMOR

## (undated) DEVICE — 3M™ IOBAN™ 2 ANTIMICROBIAL INCISE DRAPE 6650EZ: Brand: IOBAN™ 2

## (undated) DEVICE — SPONGE,NEURO,1"X1",XR,STRL,LF,10/PK: Brand: MEDLINE

## (undated) DEVICE — GLOVE SURG SZ 7 L12IN THK7.5MIL DK GRN LTX FREE MSG6570] MEDLINE INDUSTRIES INC]

## (undated) DEVICE — SUTURE VCRL SZ 0 L18IN ABSRB UD L36MM CT-1 1/2 CIR J840D

## (undated) DEVICE — COVER,MAYO STAND,STERILE: Brand: MEDLINE

## (undated) DEVICE — SUTURE ABSRB BRAID COAT UD CP NO 2 27IN VCRL J195H

## (undated) DEVICE — DRAPE THYROID

## (undated) DEVICE — TOWEL SURG SM W12XL18IN CLR PLAS TEAR RESIST REINF ADH FRST

## (undated) DEVICE — AGENT HEMOSTATIC SURGIFLOW MATRIX KIT W/THROMBIN

## (undated) DEVICE — 3.0MM PRECISION NEURO (MATCH HEAD)

## (undated) DEVICE — SYRINGE,CONTROL,LL,FINGER,GRIP: Brand: MEDLINE INDUSTRIES, INC.

## (undated) DEVICE — C-ARM: Brand: UNBRANDED

## (undated) DEVICE — 4-PORT MANIFOLD: Brand: NEPTUNE 2

## (undated) DEVICE — DRAIN SURG 10FR L1 8IN DIA32MM SIL RND HUBLESS FULL FLUT CHN

## (undated) DEVICE — DISTRACTION PIN, 12MM

## (undated) DEVICE — SOLUTION IV IRRIG 500ML 0.9% SODIUM CHL 2F7123

## (undated) DEVICE — SYRINGE IRRIG 60ML SFT PLIABLE BLB EZ TO GRP 1 HND USE W/

## (undated) DEVICE — ELECTRODE PT RET AD L9FT HI MOIST COND ADH HYDRGEL CORDED

## (undated) DEVICE — SUTURE STRATAFIX SYMMETRIC PDS + SZ 0 L18IN ABSRB L36MM SXPP1A401

## (undated) DEVICE — SUTURE VCRL SZ 0 L36IN ABSRB UD L36MM CT-1 1/2 CIR J946H

## (undated) DEVICE — DRESSING BORDERED ADH GZ UNIV GEN USE 5IN 4IN AND 2 1/2IN

## (undated) DEVICE — WEREWOLF FASTSEAL 6.0 HEMOSTASIS WAND: Brand: FASTSEAL 6.0 HEMOSTASIS WAND

## (undated) DEVICE — STANDARD HYPODERMIC NEEDLE,POLYPROPYLENE HUB: Brand: MONOJECT

## (undated) DEVICE — NEEDLE, QUINCKE, 18GX3.5": Brand: MEDLINE

## (undated) DEVICE — BLADE ES ELASTOMERIC COAT INSUL DURABLE BEND UPTO 90DEG

## (undated) DEVICE — DRAPE,REIN 53X77,STERILE: Brand: MEDLINE

## (undated) DEVICE — APPLICATOR MEDICATED 26 CC SOLUTION HI LT ORNG CHLORAPREP

## (undated) DEVICE — LIQUIBAND RAPID ADHESIVE 36/CS 0.8ML: Brand: MEDLINE

## (undated) DEVICE — 6619 2 PTNT ISO SYS INCISE AREA&LT;(&GT;&&LT;)&GT;P: Brand: STERI-DRAPE™ IOBAN™ 2

## (undated) DEVICE — NEPTUNE E-SEP 165MM SUCTION SLEEVE: Brand: NEPTUNE E-SEP

## (undated) DEVICE — SHEET, T, LAPAROTOMY, STERILE: Brand: MEDLINE

## (undated) DEVICE — STRAP,POSITIONING,KNEE/BODY,FOAM,4X60": Brand: MEDLINE

## (undated) DEVICE — IMPLANTABLE DEVICE: Type: IMPLANTABLE DEVICE | Site: SPINE CERVICAL | Status: NON-FUNCTIONAL

## (undated) DEVICE — THE MILL DISPOSABLE - MEDIUM

## (undated) DEVICE — SUTURE STRATAFIX SPRL SZ 3-0 L5IN ABSRB UD FS L26MM 3/8 CIR SXMP2B411

## (undated) DEVICE — STVZ LUMBAR SPINE PACK: Brand: MEDLINE INDUSTRIES, INC.

## (undated) DEVICE — SOLUTION IV 250ML 0.9% SOD CHL PH 5 INJ USP VIAFLX PLAS

## (undated) DEVICE — 450 ML BOTTLE OF 0.05% CHLORHEXIDINE GLUCONATE IN 99.95% STERILE WATER FOR IRRIGATION, USP AND APPLICATOR.: Brand: IRRISEPT ANTIMICROBIAL WOUND LAVAGE

## (undated) DEVICE — SUTURE VCRL SZ 3-0 L18IN ABSRB UD L26MM SH 1/2 CIR J864D

## (undated) DEVICE — PROTECTOR ULN NRV PUR FOAM HK LOOP STRP ANATOMICALLY

## (undated) DEVICE — DRILL BIT 6975150 FLUTELESS DRILL BIT

## (undated) DEVICE — GARMENT,MEDLINE,DVT,INT,CALF,MED, GEN2: Brand: MEDLINE

## (undated) DEVICE — APPLICATOR MEDICATED 10.5 CC SOLUTION HI LT ORNG CHLORAPREP

## (undated) DEVICE — BLADE ES L4IN INSUL EDGE

## (undated) DEVICE — Device

## (undated) DEVICE — OSCILLATING TIP SAW CARTRIDGE: Brand: PRECISION FALCON

## (undated) DEVICE — SUTURE VCRL SZ 2-0 L18IN ABSRB UD CT-1 L36MM 1/2 CIR J839D

## (undated) DEVICE — Device: Brand: CLASSIC-CUF

## (undated) DEVICE — ADHESIVE SKIN CLSR 0.7ML TOP DERMBND ADV

## (undated) DEVICE — DRAIN SURG 10FR L1/8IN DIA3.2MM SIL CHN RND HUBLESS FULL

## (undated) DEVICE — LARGE BLUNT, DUAL PACK: Brand: LINA SKIN HOOK™

## (undated) DEVICE — STERILE PATIENT PROTECTIVE PAD FOR IMP® KNEE POSITIONERS & COHESIVE WRAP (10 / CASE): Brand: DE MAYO KNEE POSITIONER®

## (undated) DEVICE — 1LYRTR 16FR10ML100%SIL UMS SNP: Brand: MEDLINE INDUSTRIES, INC.

## (undated) DEVICE — COVER,TABLE,HEAVY DUTY,50"X90",STRL: Brand: MEDLINE

## (undated) DEVICE — ZIPPERED TOGA, 2X LARGE: Brand: FLYTE, SURGICOOL

## (undated) DEVICE — RESERVOIR,SUCTION,100CC,SILICONE: Brand: MEDLINE

## (undated) DEVICE — SUTURE STRATAFIX SPRL SZ 1 L14IN ABSRB VLT L48CM CTX 1/2 SXPD2B405

## (undated) DEVICE — COVER,MAYO STAND,XL,STERILE: Brand: MEDLINE

## (undated) DEVICE — SYRINGE, LUER LOCK, 10ML: Brand: MEDLINE

## (undated) DEVICE — DRAPE,THYROID,SOFT,STERILE: Brand: MEDLINE

## (undated) DEVICE — YANKAUER,FLEXIBLE HANDLE,REGLR CAPACITY: Brand: MEDLINE INDUSTRIES, INC.

## (undated) DEVICE — COVER LT HNDL BLU PLAS

## (undated) DEVICE — THE STERILE LIGHT HANDLE COVER IS USED WITH STERIS SURGICAL LIGHTING AND VISUALIZATION SYSTEMS.

## (undated) DEVICE — HYPODERMIC SAFETY NEEDLE: Brand: MAGELLAN

## (undated) DEVICE — BLADE ES L6IN ELASTOMERIC COAT EXT DURABLE BEND UPTO 90DEG

## (undated) DEVICE — NEEDLE SPINAL 22GA L3.5IN SPINOCAN

## (undated) DEVICE — DRESSING FOAM W4XL10IN AG SIL ADH ANTIMIC POSTOP OPTIFOAM

## (undated) DEVICE — SPONGE,PEANUT,XRAY,ST,SM,3/8",5/CARD: Brand: MEDLINE INDUSTRIES, INC.

## (undated) DEVICE — GLOVE SURG SZ 85 CRM LTX FREE POLYISOPRENE POLYMER BEAD ANTI

## (undated) DEVICE — TOWEL,OR,DSP,ST,NATURAL,DLX,4/PK,20PK/CS: Brand: MEDLINE

## (undated) DEVICE — LIMB HOLDER, QUICK RELEASE, 60" STRAP: Brand: MEDLINE